# Patient Record
Sex: MALE | Race: WHITE | Employment: OTHER | URBAN - METROPOLITAN AREA
[De-identification: names, ages, dates, MRNs, and addresses within clinical notes are randomized per-mention and may not be internally consistent; named-entity substitution may affect disease eponyms.]

---

## 2021-07-15 ENCOUNTER — APPOINTMENT (OUTPATIENT)
Dept: GENERAL RADIOLOGY | Age: 86
DRG: 811 | End: 2021-07-15
Payer: MEDICARE

## 2021-07-15 ENCOUNTER — HOSPITAL ENCOUNTER (INPATIENT)
Age: 86
LOS: 3 days | Discharge: OTHER INSTITUTION | DRG: 811 | End: 2021-07-19
Attending: EMERGENCY MEDICINE | Admitting: INTERNAL MEDICINE
Payer: MEDICARE

## 2021-07-15 ENCOUNTER — APPOINTMENT (OUTPATIENT)
Dept: CT IMAGING | Age: 86
DRG: 811 | End: 2021-07-15
Payer: MEDICARE

## 2021-07-15 DIAGNOSIS — D64.9 ANEMIA, UNSPECIFIED TYPE: ICD-10-CM

## 2021-07-15 DIAGNOSIS — N17.9 AKI (ACUTE KIDNEY INJURY) (HCC): Primary | ICD-10-CM

## 2021-07-15 PROBLEM — K21.9 ACID REFLUX: Status: ACTIVE | Noted: 2021-07-15

## 2021-07-15 PROBLEM — N28.9 RENAL INSUFFICIENCY: Status: ACTIVE | Noted: 2021-07-15

## 2021-07-15 LAB
ABO/RH: NORMAL
ALBUMIN SERPL-MCNC: 2.8 G/DL (ref 3.5–5.2)
ALP BLD-CCNC: 98 U/L (ref 40–129)
ALT SERPL-CCNC: 56 U/L (ref 0–40)
ANION GAP SERPL CALCULATED.3IONS-SCNC: 9 MMOL/L (ref 7–16)
ANISOCYTOSIS: ABNORMAL
ANTIBODY SCREEN: NORMAL
AST SERPL-CCNC: 68 U/L (ref 0–39)
BACTERIA: NORMAL /HPF
BASOPHILS ABSOLUTE: 0 E9/L (ref 0–0.2)
BASOPHILS RELATIVE PERCENT: 0 % (ref 0–2)
BILIRUB SERPL-MCNC: 0.6 MG/DL (ref 0–1.2)
BILIRUBIN URINE: NEGATIVE
BLOOD, URINE: ABNORMAL
BUN BLDV-MCNC: 68 MG/DL (ref 6–23)
CALCIUM SERPL-MCNC: 9.3 MG/DL (ref 8.6–10.2)
CHLORIDE BLD-SCNC: 105 MMOL/L (ref 98–107)
CHP ED QC CHECK: YES
CLARITY: CLEAR
CO2: 24 MMOL/L (ref 22–29)
COLOR: YELLOW
CREAT SERPL-MCNC: 2.1 MG/DL (ref 0.7–1.2)
EOSINOPHILS ABSOLUTE: 0 E9/L (ref 0.05–0.5)
EOSINOPHILS RELATIVE PERCENT: 0 % (ref 0–6)
GFR AFRICAN AMERICAN: 36
GFR NON-AFRICAN AMERICAN: 30 ML/MIN/1.73
GLUCOSE BLD-MCNC: 125 MG/DL (ref 74–99)
GLUCOSE BLD-MCNC: 129 MG/DL
GLUCOSE URINE: NEGATIVE MG/DL
HCT VFR BLD CALC: 21.6 % (ref 37–54)
HEMOGLOBIN: 7 G/DL (ref 12.5–16.5)
KETONES, URINE: NEGATIVE MG/DL
LACTIC ACID: 0.7 MMOL/L (ref 0.5–2.2)
LEUKOCYTE ESTERASE, URINE: NEGATIVE
LYMPHOCYTES ABSOLUTE: 0.78 E9/L (ref 1.5–4)
LYMPHOCYTES RELATIVE PERCENT: 6 % (ref 20–42)
MCH RBC QN AUTO: 34.7 PG (ref 26–35)
MCHC RBC AUTO-ENTMCNC: 32.4 % (ref 32–34.5)
MCV RBC AUTO: 106.9 FL (ref 80–99.9)
METER GLUCOSE: 129 MG/DL (ref 74–99)
MONOCYTES ABSOLUTE: 0.91 E9/L (ref 0.1–0.95)
MONOCYTES RELATIVE PERCENT: 7 % (ref 2–12)
NEUTROPHILS ABSOLUTE: 11.31 E9/L (ref 1.8–7.3)
NEUTROPHILS RELATIVE PERCENT: 87 % (ref 43–80)
NITRITE, URINE: NEGATIVE
OVALOCYTES: ABNORMAL
PDW BLD-RTO: 16.7 FL (ref 11.5–15)
PH UA: 5.5 (ref 5–9)
PLATELET # BLD: 261 E9/L (ref 130–450)
PMV BLD AUTO: 11 FL (ref 7–12)
POIKILOCYTES: ABNORMAL
POLYCHROMASIA: ABNORMAL
POTASSIUM REFLEX MAGNESIUM: 4.4 MMOL/L (ref 3.5–5)
PROTEIN UA: 30 MG/DL
RBC # BLD: 2.02 E12/L (ref 3.8–5.8)
RBC UA: NORMAL /HPF (ref 0–2)
SODIUM BLD-SCNC: 138 MMOL/L (ref 132–146)
SPECIFIC GRAVITY UA: 1.02 (ref 1–1.03)
TOTAL PROTEIN: 6.2 G/DL (ref 6.4–8.3)
UROBILINOGEN, URINE: 1 E.U./DL
WBC # BLD: 13 E9/L (ref 4.5–11.5)
WBC UA: NORMAL /HPF (ref 0–5)

## 2021-07-15 PROCEDURE — 36415 COLL VENOUS BLD VENIPUNCTURE: CPT

## 2021-07-15 PROCEDURE — 87088 URINE BACTERIA CULTURE: CPT

## 2021-07-15 PROCEDURE — 99223 1ST HOSP IP/OBS HIGH 75: CPT | Performed by: INTERNAL MEDICINE

## 2021-07-15 PROCEDURE — 86923 COMPATIBILITY TEST ELECTRIC: CPT

## 2021-07-15 PROCEDURE — 83605 ASSAY OF LACTIC ACID: CPT

## 2021-07-15 PROCEDURE — 82746 ASSAY OF FOLIC ACID SERUM: CPT

## 2021-07-15 PROCEDURE — P9016 RBC LEUKOCYTES REDUCED: HCPCS

## 2021-07-15 PROCEDURE — G0378 HOSPITAL OBSERVATION PER HR: HCPCS

## 2021-07-15 PROCEDURE — 86900 BLOOD TYPING SEROLOGIC ABO: CPT

## 2021-07-15 PROCEDURE — 80053 COMPREHEN METABOLIC PANEL: CPT

## 2021-07-15 PROCEDURE — 83540 ASSAY OF IRON: CPT

## 2021-07-15 PROCEDURE — 71045 X-RAY EXAM CHEST 1 VIEW: CPT

## 2021-07-15 PROCEDURE — 6370000000 HC RX 637 (ALT 250 FOR IP): Performed by: INTERNAL MEDICINE

## 2021-07-15 PROCEDURE — 99285 EMERGENCY DEPT VISIT HI MDM: CPT

## 2021-07-15 PROCEDURE — 86850 RBC ANTIBODY SCREEN: CPT

## 2021-07-15 PROCEDURE — 83550 IRON BINDING TEST: CPT

## 2021-07-15 PROCEDURE — 86901 BLOOD TYPING SEROLOGIC RH(D): CPT

## 2021-07-15 PROCEDURE — 85025 COMPLETE CBC W/AUTO DIFF WBC: CPT

## 2021-07-15 PROCEDURE — 93005 ELECTROCARDIOGRAM TRACING: CPT | Performed by: STUDENT IN AN ORGANIZED HEALTH CARE EDUCATION/TRAINING PROGRAM

## 2021-07-15 PROCEDURE — 82962 GLUCOSE BLOOD TEST: CPT

## 2021-07-15 PROCEDURE — 82607 VITAMIN B-12: CPT

## 2021-07-15 PROCEDURE — 2580000003 HC RX 258: Performed by: INTERNAL MEDICINE

## 2021-07-15 PROCEDURE — 81001 URINALYSIS AUTO W/SCOPE: CPT

## 2021-07-15 PROCEDURE — 2580000003 HC RX 258: Performed by: STUDENT IN AN ORGANIZED HEALTH CARE EDUCATION/TRAINING PROGRAM

## 2021-07-15 PROCEDURE — 70450 CT HEAD/BRAIN W/O DYE: CPT

## 2021-07-15 RX ORDER — VENLAFAXINE 37.5 MG/1
37.5 TABLET ORAL 2 TIMES DAILY
Status: DISCONTINUED | OUTPATIENT
Start: 2021-07-15 | End: 2021-07-19 | Stop reason: HOSPADM

## 2021-07-15 RX ORDER — DIMENHYDRINATE 50 MG
1 TABLET ORAL DAILY
Status: ON HOLD | COMMUNITY
End: 2021-09-13 | Stop reason: HOSPADM

## 2021-07-15 RX ORDER — FERROUS SULFATE 325(65) MG
325 TABLET ORAL
COMMUNITY

## 2021-07-15 RX ORDER — ROSUVASTATIN CALCIUM 10 MG/1
10 TABLET, COATED ORAL DAILY
Status: DISCONTINUED | OUTPATIENT
Start: 2021-07-16 | End: 2021-07-19 | Stop reason: HOSPADM

## 2021-07-15 RX ORDER — POLYETHYLENE GLYCOL 3350 17 G/17G
17 POWDER, FOR SOLUTION ORAL DAILY PRN
Status: DISCONTINUED | OUTPATIENT
Start: 2021-07-15 | End: 2021-07-19 | Stop reason: HOSPADM

## 2021-07-15 RX ORDER — DOCUSATE SODIUM 100 MG/1
100 CAPSULE, LIQUID FILLED ORAL 2 TIMES DAILY
COMMUNITY

## 2021-07-15 RX ORDER — BISACODYL 10 MG
10 SUPPOSITORY, RECTAL RECTAL DAILY
COMMUNITY

## 2021-07-15 RX ORDER — ACETAMINOPHEN 325 MG/1
650 TABLET ORAL EVERY 6 HOURS PRN
Status: DISCONTINUED | OUTPATIENT
Start: 2021-07-15 | End: 2021-07-19 | Stop reason: HOSPADM

## 2021-07-15 RX ORDER — ACETAMINOPHEN 650 MG/1
650 SUPPOSITORY RECTAL EVERY 6 HOURS PRN
Status: DISCONTINUED | OUTPATIENT
Start: 2021-07-15 | End: 2021-07-19 | Stop reason: HOSPADM

## 2021-07-15 RX ORDER — 0.9 % SODIUM CHLORIDE 0.9 %
1000 INTRAVENOUS SOLUTION INTRAVENOUS ONCE
Status: COMPLETED | OUTPATIENT
Start: 2021-07-15 | End: 2021-07-15

## 2021-07-15 RX ORDER — ONDANSETRON 2 MG/ML
4 INJECTION INTRAMUSCULAR; INTRAVENOUS EVERY 6 HOURS PRN
Status: DISCONTINUED | OUTPATIENT
Start: 2021-07-15 | End: 2021-07-19 | Stop reason: HOSPADM

## 2021-07-15 RX ORDER — CLOPIDOGREL BISULFATE 75 MG/1
75 TABLET ORAL DAILY
Status: ON HOLD | COMMUNITY
End: 2021-09-13 | Stop reason: HOSPADM

## 2021-07-15 RX ORDER — SACUBITRIL AND VALSARTAN 24; 26 MG/1; MG/1
1 TABLET, FILM COATED ORAL 2 TIMES DAILY
COMMUNITY

## 2021-07-15 RX ORDER — SODIUM CHLORIDE 9 MG/ML
25 INJECTION, SOLUTION INTRAVENOUS PRN
Status: DISCONTINUED | OUTPATIENT
Start: 2021-07-15 | End: 2021-07-19 | Stop reason: HOSPADM

## 2021-07-15 RX ORDER — ROSUVASTATIN CALCIUM 10 MG/1
10 TABLET, COATED ORAL DAILY
COMMUNITY

## 2021-07-15 RX ORDER — TORSEMIDE 10 MG/1
5 TABLET ORAL EVERY OTHER DAY
COMMUNITY

## 2021-07-15 RX ORDER — ONDANSETRON 4 MG/1
4 TABLET, ORALLY DISINTEGRATING ORAL EVERY 8 HOURS PRN
Status: DISCONTINUED | OUTPATIENT
Start: 2021-07-15 | End: 2021-07-19 | Stop reason: HOSPADM

## 2021-07-15 RX ORDER — CARVEDILOL 6.25 MG/1
6.25 TABLET ORAL 2 TIMES DAILY WITH MEALS
Status: DISCONTINUED | OUTPATIENT
Start: 2021-07-16 | End: 2021-07-19 | Stop reason: HOSPADM

## 2021-07-15 RX ORDER — SODIUM CHLORIDE 0.9 % (FLUSH) 0.9 %
5-40 SYRINGE (ML) INJECTION EVERY 12 HOURS SCHEDULED
Status: DISCONTINUED | OUTPATIENT
Start: 2021-07-15 | End: 2021-07-19 | Stop reason: HOSPADM

## 2021-07-15 RX ORDER — SODIUM CHLORIDE 0.9 % (FLUSH) 0.9 %
5-40 SYRINGE (ML) INJECTION PRN
Status: DISCONTINUED | OUTPATIENT
Start: 2021-07-15 | End: 2021-07-19 | Stop reason: HOSPADM

## 2021-07-15 RX ORDER — TRAMADOL HYDROCHLORIDE 50 MG/1
50 TABLET ORAL EVERY 4 HOURS PRN
Status: ON HOLD | COMMUNITY
End: 2021-09-13 | Stop reason: HOSPADM

## 2021-07-15 RX ORDER — FAMOTIDINE 20 MG/1
20 TABLET, FILM COATED ORAL DAILY
Status: DISCONTINUED | OUTPATIENT
Start: 2021-07-16 | End: 2021-07-16

## 2021-07-15 RX ORDER — FAMOTIDINE 20 MG/1
20 TABLET, FILM COATED ORAL DAILY
Status: ON HOLD | COMMUNITY
End: 2021-09-13 | Stop reason: HOSPADM

## 2021-07-15 RX ORDER — SODIUM CHLORIDE 9 MG/ML
INJECTION, SOLUTION INTRAVENOUS CONTINUOUS
Status: DISCONTINUED | OUTPATIENT
Start: 2021-07-15 | End: 2021-07-17

## 2021-07-15 RX ORDER — CARVEDILOL 6.25 MG/1
6.25 TABLET ORAL 2 TIMES DAILY WITH MEALS
Status: ON HOLD | COMMUNITY
End: 2021-09-13 | Stop reason: HOSPADM

## 2021-07-15 RX ORDER — FERROUS SULFATE 325(65) MG
325 TABLET ORAL
Status: DISCONTINUED | OUTPATIENT
Start: 2021-07-16 | End: 2021-07-16

## 2021-07-15 RX ORDER — VENLAFAXINE 37.5 MG/1
37.5 TABLET ORAL 2 TIMES DAILY
COMMUNITY

## 2021-07-15 RX ORDER — VITAMIN B COMPLEX
1 CAPSULE ORAL DAILY
COMMUNITY

## 2021-07-15 RX ADMIN — SODIUM CHLORIDE 1000 ML: 9 INJECTION, SOLUTION INTRAVENOUS at 18:28

## 2021-07-15 RX ADMIN — SODIUM CHLORIDE: 9 INJECTION, SOLUTION INTRAVENOUS at 19:55

## 2021-07-15 RX ADMIN — SACUBITRIL AND VALSARTAN 1 TABLET: 24; 26 TABLET, FILM COATED ORAL at 23:30

## 2021-07-15 RX ADMIN — SODIUM CHLORIDE, PRESERVATIVE FREE 10 ML: 5 INJECTION INTRAVENOUS at 23:29

## 2021-07-15 RX ADMIN — VENLAFAXINE 37.5 MG: 37.5 TABLET ORAL at 23:30

## 2021-07-15 ASSESSMENT — PAIN DESCRIPTION - LOCATION: LOCATION: HIP

## 2021-07-15 ASSESSMENT — PAIN DESCRIPTION - DESCRIPTORS: DESCRIPTORS: ACHING;DISCOMFORT

## 2021-07-15 ASSESSMENT — PAIN SCALES - GENERAL
PAINLEVEL_OUTOF10: 0
PAINLEVEL_OUTOF10: 4

## 2021-07-15 ASSESSMENT — PAIN DESCRIPTION - ORIENTATION: ORIENTATION: RIGHT

## 2021-07-15 ASSESSMENT — PAIN DESCRIPTION - ONSET: ONSET: ON-GOING

## 2021-07-15 ASSESSMENT — PAIN DESCRIPTION - FREQUENCY: FREQUENCY: INTERMITTENT

## 2021-07-15 ASSESSMENT — PAIN DESCRIPTION - PAIN TYPE: TYPE: SURGICAL PAIN

## 2021-07-15 NOTE — ED NOTES
Bed: 04  Expected date:   Expected time:   Means of arrival:   Comments:  [id number] [place] [facility]  07/15/21 7963

## 2021-07-15 NOTE — ED NOTES
Applied oxygen 2.5 LPM N/C. Oxygen saturation while sleeping was 87-88% R/a. Patient is a mouth breather. 1:1 w/o effect, will monitor.      Nolvia Laura RN  07/15/21 5173

## 2021-07-15 NOTE — ED PROVIDER NOTES
Chief Complaint   Patient presents with    Other     post op left hip repair, reported low Hbg from SNF today of 6.6       Patient is a 80-year-old male presents today for evaluation of abnormal lab work. Per EMS patient is from nursing home, was just released from [facility] yesterday for which he did have a left hip replacement. He had routine blood work today which noted he was anemic with a hemoglobin in the sixes. Per chart review patient is not on anticoagulation. On presentation he is alert to person only, he is able to move extremities and follow commands, however does not know where he is or what year it is. He is at baseline per EMS. He has no complaints at this time. Denies chest pain or shortness of breath. Denies cough, fevers or chills. Denies dark stools or blood in his stools. The history is provided by the patient and the nursing home. Review of Systems   Unable to perform ROS: Mental status change        Physical Exam  Vitals and nursing note reviewed. Constitutional:       General: He is not in acute distress. Appearance: He is well-developed. He is not toxic-appearing. HENT:      Head: Normocephalic and atraumatic. Eyes:      Pupils: Pupils are equal, round, and reactive to light. Cardiovascular:      Rate and Rhythm: Normal rate and regular rhythm. Heart sounds: Normal heart sounds. No murmur heard. Pulmonary:      Effort: Pulmonary effort is normal. No respiratory distress. Breath sounds: Normal breath sounds. No wheezing or rales. Abdominal:      General: Bowel sounds are normal.      Palpations: Abdomen is soft. Tenderness: There is no abdominal tenderness. There is no guarding or rebound. Musculoskeletal:      Cervical back: Normal range of motion and neck supple. Skin:     General: Skin is warm and dry. Capillary Refill: Capillary refill takes less than 2 seconds. Comments:  Well appearing surgical incision to left lateral hip, no erythema    Neurological:      Mental Status: He is alert. He is disoriented. Cranial Nerves: No cranial nerve deficit. Coordination: Coordination normal.      Comments: Alert to person only  Following commands  Moving all extremities          Procedures     Labs Reviewed   CBC WITH AUTO DIFFERENTIAL - Abnormal; Notable for the following components:       Result Value    WBC 13.0 (*)     RBC 2.02 (*)     Hemoglobin 7.0 (*)     Hematocrit 21.6 (*)     .9 (*)     RDW 16.7 (*)     Neutrophils % 87.0 (*)     Lymphocytes % 6.0 (*)     Neutrophils Absolute 11.31 (*)     Lymphocytes Absolute 0.78 (*)     Eosinophils Absolute 0.00 (*)     All other components within normal limits   COMPREHENSIVE METABOLIC PANEL W/ REFLEX TO MG FOR LOW K - Abnormal; Notable for the following components:    Glucose 125 (*)     BUN 68 (*)     CREATININE 2.1 (*)     Total Protein 6.2 (*)     Albumin 2.8 (*)     ALT 56 (*)     AST 68 (*)     All other components within normal limits   URINALYSIS - Abnormal; Notable for the following components:    Blood, Urine SMALL (*)     Protein, UA 30 (*)     All other components within normal limits   POCT GLUCOSE - Abnormal; Notable for the following components:    Meter Glucose 129 (*)     All other components within normal limits   POCT GLUCOSE - Normal   CULTURE, URINE   LACTIC ACID, PLASMA   MICROSCOPIC URINALYSIS   TYPE AND SCREEN     XR CHEST 1 VIEW   Final Result   No radiographic evidence of definite acute cardiopulmonary disease in the   visualized chest         CT Head WO Contrast   Final Result   No acute intracranial abnormality. Periventricular white matter changes consistent chronic microvascular   disease. Diffuse volume loss. EKG #1:  I personally interpreted this EKG  Time:  1756    Rate: 65  Rhythm: Sinus.   Interpretation: Sinus rhythm with first-degree AV block, left axis deviation, left bundle branch block, no previous for comparison patient is a 61-year-old male presents today for concern for anemia. [facility]  Number of Diagnoses or Management Options  MAVIS (acute kidney injury) ([facility] [address]  Anemia, unspecified type  Diagnosis management comments: Patient is a 61-year-old male presents today for concerns for anemia. On presentation patient is alert to person only, however this is baseline according to EMS. Patient is able to follow commands. Surgical incision to left hip appears well hearing, no evidence of bleeding at this time. Lab work imaging was obtained. Lab work shows hemoglobin is 7.0, no previous for comparison. Creatinine elevated 2.1, no previous, however per chart review patient does not have history of kidney disease. This is likely due to dehydration. Patient was given IV fluid bolus and started on fluid maintenance. CT head is unremarkable, chest x-ray and UA showed no evidence of infection. With borderline anemia of 7.0 and creatinine of 2.  1 we did recommend admission to the hospital for observation. Hospitalist was consulted agrees to admit. Amount and/or Complexity of Data Reviewed  Clinical lab tests: reviewed  Tests in the radiology section of CPT®: reviewed  Tests in the medicine section of CPT®: reviewed                  --------------------------------------------- PAST HISTORY ---------------------------------------------  Past Medical History:  has a past medical history of CAD (coronary artery disease), CHF (congestive heart failure) ([facility] [address] COPD (chronic obstructive pulmonary disease) ([facility] GERD (gastroesophageal reflux disease), Hyperlipidemia, Hypertension, and Myocardial infarction ([facility] Past Surgical History:  has a past surgical history that includes Coronary artery bypass graft (15 yrs ago); Colonoscopy; Cataract removal with implant (Right, 3/3/14); and Cataract removal with implant (Left, 10/13/2014). Social History:  reports that he quit smoking about 37 years ago.  He quit after 20.00 years of use. He does not have any smokeless tobacco history on file. He reports that he does not drink alcohol and does not use drugs. Family History: family history is not on file. The patients home medications have been reviewed.     Allergies: Fentanyl and Penicillins    -------------------------------------------------- RESULTS -------------------------------------------------    LABS:  Results for orders placed or performed during the hospital encounter of 07/15/21   CBC Auto Differential   Result Value Ref Range    WBC 13.0 (H) 4.5 - 11.5 E9/L    RBC 2.02 (L) 3.80 - 5.80 E12/L    Hemoglobin 7.0 (L) 12.5 - 16.5 g/dL    Hematocrit 21.6 (L) 37.0 - 54.0 %    .9 (H) 80.0 - 99.9 fL    MCH 34.7 26.0 - 35.0 pg    MCHC 32.4 32.0 - 34.5 %    RDW 16.7 (H) 11.5 - 15.0 fL    Platelets 669 156 - 878 E9/L    MPV 11.0 7.0 - 12.0 fL    Neutrophils % 87.0 (H) 43.0 - 80.0 %    Lymphocytes % 6.0 (L) 20.0 - 42.0 %    Monocytes % 7.0 2.0 - 12.0 %    Eosinophils % 0.0 0.0 - 6.0 %    Basophils % 0.0 0.0 - 2.0 %    Neutrophils Absolute 11.31 (H) 1.80 - 7.30 E9/L    Lymphocytes Absolute 0.78 (L) 1.50 - 4.00 E9/L    Monocytes Absolute 0.91 0.10 - 0.95 E9/L    Eosinophils Absolute 0.00 (L) 0.05 - 0.50 E9/L    Basophils Absolute 0.00 0.00 - 0.20 E9/L    Anisocytosis 1+     Polychromasia 1+     Poikilocytes 1+     Ovalocytes 1+    Comprehensive Metabolic Panel w/ Reflex to MG   Result Value Ref Range    Sodium 138 132 - 146 mmol/L    Potassium reflex Magnesium 4.4 3.5 - 5.0 mmol/L    Chloride 105 98 - 107 mmol/L    CO2 24 22 - 29 mmol/L    Anion Gap 9 7 - 16 mmol/L    Glucose 125 (H) 74 - 99 mg/dL    BUN 68 (H) 6 - 23 mg/dL    CREATININE 2.1 (H) 0.7 - 1.2 mg/dL    GFR Non-African American 30 >=60 mL/min/1.73    GFR African American 36     Calcium 9.3 8.6 - 10.2 mg/dL    Total Protein 6.2 (L) 6.4 - 8.3 g/dL    Albumin 2.8 (L) 3.5 - 5.2 g/dL    Total Bilirubin 0.6 0.0 - 1.2 mg/dL    Alkaline Phosphatase 98 40 - 129 U/L    ALT 56 (H) 0 - 40 U/L    AST 68 (H) 0 - 39 U/L   Lactic Acid, Plasma   Result Value Ref Range    Lactic Acid 0.7 0.5 - 2.2 mmol/L   Urinalysis, reflex to microscopic   Result Value Ref Range    Color, UA Yellow Straw/Yellow    Clarity, UA Clear Clear    Glucose, Ur Negative Negative mg/dL    Bilirubin Urine Negative Negative    Ketones, Urine Negative Negative mg/dL    Specific Gravity, UA 1.020 1.005 - 1.030    Blood, Urine SMALL (A) Negative    pH, UA 5.5 5.0 - 9.0    Protein, UA 30 (A) Negative mg/dL    Urobilinogen, Urine 1.0 <2.0 E.U./dL    Nitrite, Urine Negative Negative    Leukocyte Esterase, Urine Negative Negative   Microscopic Urinalysis   Result Value Ref Range    WBC, UA NONE 0 - 5 /HPF    RBC, UA 0-1 0 - 2 /HPF    Bacteria, UA NONE SEEN None Seen /HPF   POCT Glucose   Result Value Ref Range    Glucose 129 mg/dL    QC OK? yes    POCT Glucose   Result Value Ref Range    Meter Glucose 129 (H) 74 - 99 mg/dL   EKG 12 Lead   Result Value Ref Range    Ventricular Rate 65 BPM    Atrial Rate 65 BPM    P-R Interval 236 ms    QRS Duration 170 ms    Q-T Interval 442 ms    QTc Calculation (Bazett) 459 ms    P Axis 12 degrees    R Axis -54 degrees    T Axis 108 degrees   TYPE AND SCREEN   Result Value Ref Range    ABO/Rh B POS     Antibody Screen NEG        RADIOLOGY:  XR CHEST 1 VIEW   Final Result   No radiographic evidence of definite acute cardiopulmonary disease in the   visualized chest         CT Head WO Contrast   Final Result   No acute intracranial abnormality. Periventricular white matter changes consistent chronic microvascular   disease. Diffuse volume loss.                 ------------------------- NURSING NOTES AND VITALS REVIEWED ---------------------------  Date / Time Roomed:  7/15/2021  4:26 PM  ED Bed Assignment:  04/04    The nursing notes within the ED encounter and vital signs as below have been reviewed.      Patient Vitals for the past 24 hrs:   BP Temp Temp src Pulse Resp SpO2 Weight 07/15/21 1826 137/62   62 26 96 %    07/15/21 1747 (!) 141/58 98.9 °F (37.2 °C)  69 (!) 31 97 %    07/15/21 1634 (!) 155/65 98.9 °F (37.2 °C) Oral 74 18 91 % 180 lb 3.2 oz (81.7 kg)       Oxygen Saturation Interpretation: Normal    ------------------------------------------ PROGRESS NOTES ------------------------------------------    Counseling:  I have spoken with the patient and discussed todays results, in addition to providing specific details for the plan of care and counseling regarding the diagnosis and prognosis. Their questions are answered at this time and they are agreeable with the plan of admission.    --------------------------------- ADDITIONAL PROVIDER NOTES ---------------------------------  Consultations:  Spoke with Dr. Caren Diaz. Discussed case. They will admit the patient. This patient's ED course included: a personal history and physicial examination    This patient has remained hemodynamically stable during their ED course. Medications   0.9 % sodium chloride infusion (has no administration in time range)   0.9 % sodium chloride bolus (0 mLs Intravenous Stopped 7/15/21 8838)         Diagnosis:  1. MAVIS (acute kidney injury) ([facility] [address]    2. Anemia, unspecified type        Disposition:  Patient's disposition: Admit to telemetry  Patient's condition is stable.              María Chino DO  Resident  07/15/21 8961

## 2021-07-15 NOTE — LETTER
[place] Department Medicaid  CERTIFICATION OF NECESSITY  FOR NON-EMERGENCY TRANSPORTATION   BY [facility] AM[facility]UL[facility]      Individual Information   1. Name: Tatum Bone 2. [place] Medicaid Billing Number:    3. Address: [address]      Transportation Provider Information   4. Provider Name: [facility]    5. [place] Medicaid Provider Number:  National Provider Identifier (NPI):      Certification  7. Criteria:  During transport, this individual requires:  [x] Medical treatment or continuous     supervision by an EMT. [] The administration or regulation of oxygen by another person. [] Supervised protective restraint. 8. Period Beginning Date: 7-   9. Length  [x] Not more than 1 day(s)  [] One Year     Additional Information Relevant to Certification   10. Comments or Explanations, If Necessary or Appropriate     Recernt left hip ORIF ,increased pain when sitting in chair, CHF, COPD      Certifying Practitioner Information   11. Name of Practitioner: Emily Alvares MD    12. [place] Medicaid Provider Number, If Applicable:  [address] Provider Identifier (NPI): [id number]     Signature Information   14. Date of Signature: 7- 15. Name of Person Signing: Jimmy Rosario RN   16.  Signature and Professional Designation: Electronically signed by Jimmy Rosario RN on 7/19/2021 at 10:53 AM       ODM [id number]

## 2021-07-16 PROBLEM — I25.10 CAD (CORONARY ARTERY DISEASE): Status: ACTIVE | Noted: 2021-07-16

## 2021-07-16 PROBLEM — F32.A DEPRESSION: Status: ACTIVE | Noted: 2021-07-16

## 2021-07-16 PROBLEM — I50.9 CHF (CONGESTIVE HEART FAILURE) (HCC): Status: ACTIVE | Noted: 2021-07-16

## 2021-07-16 PROBLEM — I50.30 DIASTOLIC CONGESTIVE HEART FAILURE (HCC): Status: ACTIVE | Noted: 2021-07-16

## 2021-07-16 PROBLEM — J96.01 ACUTE RESPIRATORY FAILURE WITH HYPOXIA (HCC): Status: ACTIVE | Noted: 2021-07-16

## 2021-07-16 PROBLEM — J44.9 COPD (CHRONIC OBSTRUCTIVE PULMONARY DISEASE) (HCC): Status: ACTIVE | Noted: 2021-07-16

## 2021-07-16 LAB
ANION GAP SERPL CALCULATED.3IONS-SCNC: 11 MMOL/L (ref 7–16)
BLOOD BANK DISPENSE STATUS: NORMAL
BLOOD BANK DISPENSE STATUS: NORMAL
BLOOD BANK PRODUCT CODE: NORMAL
BLOOD BANK PRODUCT CODE: NORMAL
BPU ID: NORMAL
BPU ID: NORMAL
BUN BLDV-MCNC: 63 MG/DL (ref 6–23)
CALCIUM SERPL-MCNC: 8.8 MG/DL (ref 8.6–10.2)
CHLORIDE BLD-SCNC: 106 MMOL/L (ref 98–107)
CO2: 23 MMOL/L (ref 22–29)
CREAT SERPL-MCNC: 1.9 MG/DL (ref 0.7–1.2)
DESCRIPTION BLOOD BANK: NORMAL
DESCRIPTION BLOOD BANK: NORMAL
EKG ATRIAL RATE: 65 BPM
EKG P AXIS: 12 DEGREES
EKG P-R INTERVAL: 236 MS
EKG Q-T INTERVAL: 442 MS
EKG QRS DURATION: 170 MS
EKG QTC CALCULATION (BAZETT): 459 MS
EKG R AXIS: -54 DEGREES
EKG T AXIS: 108 DEGREES
EKG VENTRICULAR RATE: 65 BPM
FERRITIN: 858 NG/ML
FOLATE: >20 NG/ML (ref 4.8–24.2)
GFR AFRICAN AMERICAN: 41
GFR NON-AFRICAN AMERICAN: 34 ML/MIN/1.73
GLUCOSE BLD-MCNC: 109 MG/DL (ref 74–99)
HCT VFR BLD CALC: 19.7 % (ref 37–54)
HCT VFR BLD CALC: 21.4 % (ref 37–54)
HCT VFR BLD CALC: 25.8 % (ref 37–54)
HEMOGLOBIN: 6.3 G/DL (ref 12.5–16.5)
HEMOGLOBIN: 6.9 G/DL (ref 12.5–16.5)
HEMOGLOBIN: 8.4 G/DL (ref 12.5–16.5)
IRON SATURATION: 20 % (ref 20–55)
IRON: 27 MCG/DL (ref 59–158)
MCH RBC QN AUTO: 34.6 PG (ref 26–35)
MCHC RBC AUTO-ENTMCNC: 32 % (ref 32–34.5)
MCV RBC AUTO: 108.2 FL (ref 80–99.9)
PDW BLD-RTO: 16.6 FL (ref 11.5–15)
PLATELET # BLD: 254 E9/L (ref 130–450)
PMV BLD AUTO: 11.1 FL (ref 7–12)
POTASSIUM REFLEX MAGNESIUM: 4.5 MMOL/L (ref 3.5–5)
PROCALCITONIN: 0.28 NG/ML (ref 0–0.08)
RBC # BLD: 1.82 E12/L (ref 3.8–5.8)
SARS-COV-2, NAAT: NOT DETECTED
SODIUM BLD-SCNC: 140 MMOL/L (ref 132–146)
TOTAL IRON BINDING CAPACITY: 136 MCG/DL (ref 250–450)
VITAMIN B-12: 1093 PG/ML (ref 211–946)
WBC # BLD: 11.7 E9/L (ref 4.5–11.5)

## 2021-07-16 PROCEDURE — 99233 SBSQ HOSP IP/OBS HIGH 50: CPT | Performed by: INTERNAL MEDICINE

## 2021-07-16 PROCEDURE — 80048 BASIC METABOLIC PNL TOTAL CA: CPT

## 2021-07-16 PROCEDURE — 85014 HEMATOCRIT: CPT

## 2021-07-16 PROCEDURE — 85018 HEMOGLOBIN: CPT

## 2021-07-16 PROCEDURE — 6370000000 HC RX 637 (ALT 250 FOR IP): Performed by: INTERNAL MEDICINE

## 2021-07-16 PROCEDURE — 2700000000 HC OXYGEN THERAPY PER DAY

## 2021-07-16 PROCEDURE — 2580000003 HC RX 258: Performed by: INTERNAL MEDICINE

## 2021-07-16 PROCEDURE — 36415 COLL VENOUS BLD VENIPUNCTURE: CPT

## 2021-07-16 PROCEDURE — 97161 PT EVAL LOW COMPLEX 20 MIN: CPT | Performed by: PHYSICAL THERAPIST

## 2021-07-16 PROCEDURE — 6370000000 HC RX 637 (ALT 250 FOR IP): Performed by: NURSE PRACTITIONER

## 2021-07-16 PROCEDURE — 97165 OT EVAL LOW COMPLEX 30 MIN: CPT | Performed by: OCCUPATIONAL THERAPIST

## 2021-07-16 PROCEDURE — 87635 SARS-COV-2 COVID-19 AMP PRB: CPT

## 2021-07-16 PROCEDURE — 93010 ELECTROCARDIOGRAM REPORT: CPT | Performed by: INTERNAL MEDICINE

## 2021-07-16 PROCEDURE — 1200000000 HC SEMI PRIVATE

## 2021-07-16 PROCEDURE — 82728 ASSAY OF FERRITIN: CPT

## 2021-07-16 PROCEDURE — 36430 TRANSFUSION BLD/BLD COMPNT: CPT

## 2021-07-16 PROCEDURE — APPSS30 APP SPLIT SHARED TIME 16-30 MINUTES: Performed by: NURSE PRACTITIONER

## 2021-07-16 PROCEDURE — 84145 PROCALCITONIN (PCT): CPT

## 2021-07-16 PROCEDURE — 85027 COMPLETE CBC AUTOMATED: CPT

## 2021-07-16 PROCEDURE — 97530 THERAPEUTIC ACTIVITIES: CPT | Performed by: PHYSICAL THERAPIST

## 2021-07-16 PROCEDURE — 2580000003 HC RX 258: Performed by: STUDENT IN AN ORGANIZED HEALTH CARE EDUCATION/TRAINING PROGRAM

## 2021-07-16 RX ORDER — ALBUTEROL SULFATE 2.5 MG/3ML
2.5 SOLUTION RESPIRATORY (INHALATION)
Status: DISCONTINUED | OUTPATIENT
Start: 2021-07-16 | End: 2021-07-19 | Stop reason: HOSPADM

## 2021-07-16 RX ORDER — IPRATROPIUM BROMIDE AND ALBUTEROL SULFATE 2.5; .5 MG/3ML; MG/3ML
1 SOLUTION RESPIRATORY (INHALATION)
Status: DISCONTINUED | OUTPATIENT
Start: 2021-07-16 | End: 2021-07-16

## 2021-07-16 RX ORDER — FERROUS SULFATE 325(65) MG
325 TABLET ORAL 2 TIMES DAILY WITH MEALS
Status: DISCONTINUED | OUTPATIENT
Start: 2021-07-16 | End: 2021-07-19 | Stop reason: HOSPADM

## 2021-07-16 RX ORDER — SODIUM CHLORIDE 9 MG/ML
INJECTION, SOLUTION INTRAVENOUS PRN
Status: DISCONTINUED | OUTPATIENT
Start: 2021-07-16 | End: 2021-07-19 | Stop reason: HOSPADM

## 2021-07-16 RX ORDER — PANTOPRAZOLE SODIUM 40 MG/1
40 TABLET, DELAYED RELEASE ORAL
Status: DISCONTINUED | OUTPATIENT
Start: 2021-07-16 | End: 2021-07-19 | Stop reason: HOSPADM

## 2021-07-16 RX ORDER — DOCUSATE SODIUM 100 MG/1
100 CAPSULE, LIQUID FILLED ORAL DAILY
Status: DISCONTINUED | OUTPATIENT
Start: 2021-07-16 | End: 2021-07-19 | Stop reason: HOSPADM

## 2021-07-16 RX ADMIN — SACUBITRIL AND VALSARTAN 1 TABLET: 24; 26 TABLET, FILM COATED ORAL at 07:49

## 2021-07-16 RX ADMIN — SACUBITRIL AND VALSARTAN 1 TABLET: 24; 26 TABLET, FILM COATED ORAL at 21:06

## 2021-07-16 RX ADMIN — VENLAFAXINE 37.5 MG: 37.5 TABLET ORAL at 21:07

## 2021-07-16 RX ADMIN — VENLAFAXINE 37.5 MG: 37.5 TABLET ORAL at 07:49

## 2021-07-16 RX ADMIN — FERROUS SULFATE TAB 325 MG (65 MG ELEMENTAL FE) 325 MG: 325 (65 FE) TAB at 16:59

## 2021-07-16 RX ADMIN — SODIUM CHLORIDE, PRESERVATIVE FREE 10 ML: 5 INJECTION INTRAVENOUS at 21:10

## 2021-07-16 RX ADMIN — CARVEDILOL 6.25 MG: 6.25 TABLET, FILM COATED ORAL at 16:57

## 2021-07-16 RX ADMIN — FERROUS SULFATE TAB 325 MG (65 MG ELEMENTAL FE) 325 MG: 325 (65 FE) TAB at 07:49

## 2021-07-16 RX ADMIN — DOCUSATE SODIUM 100 MG: 100 CAPSULE, LIQUID FILLED ORAL at 13:28

## 2021-07-16 RX ADMIN — SODIUM CHLORIDE: 9 INJECTION, SOLUTION INTRAVENOUS at 07:50

## 2021-07-16 RX ADMIN — ACETAMINOPHEN 650 MG: 325 TABLET ORAL at 12:20

## 2021-07-16 RX ADMIN — ROSUVASTATIN CALCIUM 10 MG: 10 TABLET, COATED ORAL at 07:49

## 2021-07-16 RX ADMIN — CARVEDILOL 6.25 MG: 6.25 TABLET, FILM COATED ORAL at 07:50

## 2021-07-16 RX ADMIN — PANTOPRAZOLE SODIUM 40 MG: 40 TABLET, DELAYED RELEASE ORAL at 16:57

## 2021-07-16 RX ADMIN — FAMOTIDINE 20 MG: 20 TABLET ORAL at 07:49

## 2021-07-16 ASSESSMENT — PAIN SCALES - GENERAL
PAINLEVEL_OUTOF10: 4
PAINLEVEL_OUTOF10: 0
PAINLEVEL_OUTOF10: 0
PAINLEVEL_OUTOF10: 5

## 2021-07-16 NOTE — PROGRESS NOTES
[place] [address] Hospitalist   Progress Note    Admitting Date and Time: 7/15/2021  4:26 PM  Admit Dx: MAVIS (acute kidney injury) ([facility] [address] [N17.9]  [facility] [N17.9]    Subjective:    Patient seen and examined. Physical therapy at bedside helping to stand patient. He was unable to ambulate. Patient cooperative, but having difficulty following directions. ROS: denies fever, chills, cp, sob, n/v, HA unless stated above.      pantoprazole  40 mg Oral BID AC    carvedilol  6.25 mg Oral BID WC    ferrous sulfate  325 mg Oral Daily with breakfast    rosuvastatin  10 mg Oral Daily    sacubitril-valsartan  1 tablet Oral BID    venlafaxine  37.5 mg Oral BID    sodium chloride flush  5-40 mL Intravenous 2 times per day     sodium chloride, , PRN  albuterol, 2.5 mg, Q2H PRN  sodium chloride flush, 5-40 mL, PRN  sodium chloride, 25 mL, PRN  ondansetron, 4 mg, Q8H PRN   Or  ondansetron, 4 mg, Q6H PRN  polyethylene glycol, 17 g, Daily PRN  acetaminophen, 650 mg, Q6H PRN   Or  acetaminophen, 650 mg, Q6H PRN         Objective:    BP (!) 119/56   Pulse 70   Temp 97.9 °F (36.6 °C) (Axillary)   Resp 20   Ht 6' (1.829 m)   Wt 180 lb 9.6 oz (81.9 kg)   SpO2 94%   BMI 24.49 kg/m²   General Appearance: alert and oriented to person and in no acute distress  Skin: warm and dry, left hip incision with staples, small amount of drainage noted  Head: normocephalic and atraumatic  Eyes: pupils equal, round, and reactive to light, conjunctivae normal  Neck: neck supple and non tender without mass   Pulmonary/Chest: clear to auscultation bilaterally- no wheezes, rales or rhonchi, normal air movement, no respiratory distress  Cardiovascular: normal rate, normal S1 and S2   Abdomen: soft, non-tender, non-distended, normal bowel sounds, no masses or organomegaly  Extremities: no cyanosis, no clubbing and no edema  Neurologic: no cranial nerve deficit and speech normal      Recent Labs 07/15/21  1654 07/15/21  1655 07/16/21  0659   NA  --  138 140   K  --  4.4 4.5   CL  --  105 106   CO2  --  24 23   BUN  --  68* 63*   CREATININE  --  2.1* 1.9*   GLUCOSE 129 125* 109*   CALCIUM  --  9.3 8.8       Recent Labs     07/15/21  1655   ALKPHOS 98   PROT 6.2*   LABALBU 2.8*   BILITOT 0.6   AST 68*   ALT 56*       Recent Labs     07/15/21  1655 07/16/21  0659   WBC 13.0* 11.7*   RBC 2.02* 1.82*   HGB 7.0* 6.3*   HCT 21.6* 19.7*   .9* 108.2*   MCH 34.7 34.6   MCHC 32.4 32.0   RDW 16.7* 16.6*    254   MPV 11.0 11.1           Radiology:   XR CHEST 1 VIEW   Final Result   No radiographic evidence of definite acute cardiopulmonary disease in the   visualized chest         CT Head WO Contrast   Final Result   No acute intracranial abnormality. Periventricular white matter changes consistent chronic microvascular   disease. Diffuse volume loss. Assessment:  Principal Problem:    Anemia  Resolved Problems:    * No resolved hospital problems. *      Plan:  1. Anemia: Possibly secondary to GI bleed vs post op. Hemoglobin was 6.3 on admission. Received one unit packed red blood cells. Iron level 27. Increase Ferrous sulfate to twice daily. Folate and B12 pending. Continuous IV fluids. Transfuse to keep hemoglobin greater than 7. Eliquis, aspirin, plavix, and aricept on hold. Protonix ordered. 2. Acute hypoxic respiratory failure: Supplemental oxygen therapy to maintain saturation of greater than 90%. Requiring 3 liters via nasal cannula. Chest xray showed no acute cardiopulmonary disease. Duonebs and incentive spirometer ordered. 3. S/p total hip replacement: Total hip replacement performed at [facility] with discharge yesterday to skilled nursing facility. Left hip incision approximated with ecchymosis around incision. Small amount of serosanguinous drainage. Physical therapy and occupational therapy consulted.     4. Chronic kidney disease: Creatinine 2.1 on admission. Baseline creatinine 1.8-2.3 per [facility]. Torsemide on hold. 5. CAD and history of stroke: History of intracoronary stent in 1993. Left carotid endartarectomy on 6/21/16. Continue carvedilol. Aspirin and plavix on hold. Continue rouvastatin. 6. Dementia: Aricept on hold due to possible GI bleed. Monitor. May need alternative agent at discharge. 7. COPD: Duonebs and incentive spirometer ordered. 8. History of Heart failure:  Continue carvedilol and entresto. Does not appear fluid overloaded at this time. 9. Depression: Continue venlafaxine. NOTE: This report was transcribed using voice recognition software. Every effort was made to ensure accuracy; however, inadvertent computerized transcription errors may be present.      Electronically signed by LAZARO Le CNP on 7/16/2021 at 1:04 PM

## 2021-07-16 NOTE — PROGRESS NOTES
Physical Therapy Initial Evaluation/Plan of Care    Room #:  7677/4137-53  Patient Name: Vishnu Biggs  YOB: 1932  MRN: [id number]    Date of Service: 7/16/2021      Tentative placement recommendation: Subacute rehab    Equipment recommendation: To be determined      Evaluating Physical Therapist: Tin Rg, PT #[id number]      Specific Provider Orders/Date/Referring Provider :  07/15/21 2315   PT evaluation and treat Start: 07/15/21 2315, End: 07/15/21 2315, ONE TIME, Standing Count: 1 Occurrences, R    Charlestine Call, DO      Admitting Diagnosis:   MAVIS (acute kidney injury) ([facility] [address] [N17.9]  MAVIS (acute kidney injury) ([facility] [address] [N17.9]  hemoglobin of 6.6    Surgery: recent left hip replacement at [facility]     Patient Active Problem List   Diagnosis    Renal insufficiency    CKD (chronic kidney disease) stage 3, GFR 30-59 ml/min ([facility])    Essential hypertension    CAD (coronary artery disease), autologous vein bypass graft    Acid reflux    Anemia        ASSESSMENT of Current Deficits Patient exhibits decreased strength, balance, endurance, range of motion and pain left lower extremity impairing functional mobility, transfers, gait , gait distance and tolerance to activity posterior lean in standing, difficulty weight bearing Left lower extremity with inability to take step with right. Patient requires continued skilled physical therapy to address concerns listed above for increased safety and function at discharge.          PHYSICAL THERAPY  PLAN OF CARE       Physical therapy plan of care is established based on physician order,  patient diagnosis and clinical assessment    Current Treatment Recommendations:    -Bed Mobility: Lower extremity exercises  and Upper extremity exercises   -Sitting Balance: Facilitate active trunk muscle engagement  and Facilitate postural control in all planes   -Standing Balance: Perform strengthening exercises in standing to promote motor control with or without upper extremity support   -Transfers: Provide instruction on proper hand and foot position for adequate transfer of weight onto lower extremities and use of gait device, Facilitate weight shift forward on to lower extremities and provide necessary stabilization of bilateral lower extremities  and Provide stabilization to prevent fall   -Gait: Gait training, Standing activities to improve: base of support, weight shift, weight bearing  and Pregait training to emphasize: Sequencing , Base of support, Device control, Upright, bilateral knee extension in stance phase of gait and Safety   -Endurance: Utilize Supervised activities to increase level of endurance to allow for safe functional mobility including transfers and gait     PT long term treatment goals are located in below grid    Patient and or family understand(s) diagnosis, prognosis, and plan of care. Frequency of treatments: Patient will be seen  twice daily. Prior Level of Function: Patient reports he was getting up to chair with assist Prior to admit   Rehab Potential: fair    for baseline    Past medical history:   Past Medical History:   Diagnosis Date    CAD (coronary artery disease)     [facility]congestive [facility] failure) ([facility] [address] [facility]     COPD (chronic obstructive pulmonary disease) ([facility] [address] [facility]     GERD (gastroesophageal reflux disease)     Hyperlipidemia     Hypertension     Myocardial infarction ([facility] [address] 15 yrs ago     Past Surgical History:   Procedure Laterality Date    CATARACT REMOVAL WITH IMPLANT Right 3/3/14    CATARACT REMOVAL WITH IMPLANT Left 10/13/2014    COLONOSCOPY      CORONARY ARTERY BYPASS GRAFT  15 yrs ago    3 stents       SUBJECTIVE:    Precautions:  Up with assistance, falls and alarm ,  left hip replacement recently at [address] history: Patient from skilled nursing facility   prior lived with daughter Umair Drew in a ranch home  with 2 steps  to enter with [facility] owned: Mariann Smith, at [employer]        [facility] Mobility Inpatient   How much difficulty turning over in bed?: A Lot  How much difficulty sitting down on / standing up from a chair with arms?: A Lot  How much difficulty moving from lying on back to sitting on side of bed?: A Lot  How much help from another person moving to and from a bed to a chair?: A Lot  How much help from another person needed to walk in hospital room?: Total  How much help from another person for climbing 3-5 steps with a railing?: Total  AM-PAC Inpatient Mobility Raw Score : 10  AM-PAC Inpatient T-Scale Score : 32.29  Mobility Inpatient CMS 0-100% Score: 76.75  Mobility Inpatient CMS G-Code Modifier : CL    Nursing cleared patient for PT evaluation. The admitting diagnosis and active problem list as listed above have been reviewed prior to the initiation of this evaluation. OBJECTIVE;   Initial Evaluation  Date: 7/16/2021 Treatment Date:     Short Term/ Long Term   Goals   Was pt agreeable to Eval/treatment? Yes    To be met in 3 days   Pain level   5/10  Left hip, after therapy c/o left TMJ pain, doctor notified     Bed Mobility    Rolling: Not assessed     Supine to sit: Moderate assist of 1     Sit to supine: Moderate assist of  2    Scooting: Moderate assist of 1    Rolling: Moderate assist of 1    Supine to sit: Minimal assist of 1    Sit to supine: Moderate assist of 1    Scooting: Minimal assist of 1     Transfers Sit to stand:  Moderate assist of 1 posterior lean, max cueing for upright  Sit to stand: Minimal assist of 1     Ambulation    1 heel toe steps using  wheeled walker with Moderate assist of  2   cues for sequencing, upright posture and safety assist to advance Left lower extremity    10 feet using  wheeled walker with Moderate assist of 1    Stair negotiation: ascended and descended   Not assessed            ROM limited Left lower extremity due to pain and weakness    Increase range of motion 10% of affected joints    Strength BUE: 3+/5  RLE:  3+/5  LLE:  2+/5   Increase strength in affected mm groups by 1/3 grade   Balance Sitting EOB:  fair    Dynamic Standing:  poor posterior lean wheeled walker   Sitting EOB:  good    Dynamic Standing: fair +wheeled walker      Patient is Alert & Oriented x person, place, time and situation  However seems confused at times and follows one step directions    Sensation:  Patient  denies numbness/tingling     Edema:  yes left lower extremity    Endurance: fair       Vitals: 2 liters nasal cannula    Blood Pressure at rest   Blood Pressure during session     Heart Rate at rest 60 Heart Rate during session     SPO2 at rest 93%  SPO2 during session  %     Patient education  Patient educated on role of Physical Therapy, risks of immobility, safety and plan of care,  importance of mobility while in hospital , ankle pumps, quad set and glut set for edema control, blood clot prevention and hip precautions      Patient response to education:   Pt verbalized understanding Pt demonstrated skill Pt requires further education in this area   Yes Partial Yes      Treatment:  Patient practiced and was instructed/facilitated in the following treatment: Patient performed exercises, assisted to edge of bed,   Sat edge of bed 15 minutes with Minimal assist of 1 to increase dynamic sitting balance and activity tolerance. doffed/donned gown. stood with constant cueing for upright and balance. One assisted side step to head of bed. Returned to bed with use of hercules bed to scoot to head of bed. Therapeutic Exercises:  ankle pumps, quad sets and glut sets  x 15 reps. At end of session, patient in bed with alarm call light and phone within reach,   all lines and tubes intact, nursing notified. Patient would benefit from continued skilled Physical Therapy to improve functional independence and quality of life.           Patient's/ family goals   rehab        Time in  0947  Time out  1026    Total Treatment Time  19

## 2021-07-16 NOTE — PROGRESS NOTES
Patient's post-op dressing over left hip from [facility] remains intact.     Electronically signed by Natan Quinn RN on 7/16/2021 at 5:03 AM

## 2021-07-16 NOTE — CARE COORDINATION
Ss note:7/16/2021 11:43 AM Neg covid test today. Pt presents from [facility] skilled rehab. Awaiting therapy notes for liaison to submit PRECERT for pt to return. RELL paged therapy, liaison will submit for precert once notes are available. Plan is to return to [place] [id number] rehab.  Lujean Ganser, LSW

## 2021-07-16 NOTE — CARE COORDINATION
Ss note:7/16/2021 2:46 PM Neg covid result today. Pt is from [facility], liaison [facility] notified to submit for [address], will await insurance approval. SW/CM can follow up with [facility] tomorrow regarding status of precert. Will need signed THAIS.  LD Krishnamurthy

## 2021-07-16 NOTE — PROGRESS NOTES
based on physician orders, patient diagnosis and results of clinical assessment     Frequency/Duration 1-3 days/wk for 2 weeks PRN   Specific OT Treatment Interventions to include:   * Instruction/training on adapted ADL techniques and AE recommendations to increase functional independence within precautions       * Training on energy conservation strategies, correct breathing pattern and techniques to improve independence/tolerance for self-care routine  * Functional transfer/mobility training/DME recommendations for increased independence, safety, and fall prevention  * Patient/Family education to increase follow through with safety techniques and functional independence  * Recommendation of environmental modifications for increased safety with functional transfers/mobility and ADLs  * Cognitive retraining/development of therapeutic activities to improve problem solving, judgement, memory, and attention for increased safety/participation in ADL/IADL tasks  * Therapeutic exercise to improve motor endurance, ROM, and functional strength for ADLs/functional transfers  * Therapeutic activities to facilitate/challenge dynamic balance, stand tolerance for increased safety and independence with ADLs  * Therapeutic activities to facilitate gross/fine motor skills for increased independence with ADLs   Recommended Adaptive Equipment/DME: unsure with poor historian; TBD       Home Living: Pt lives with daughter in a ranch home with 2 steps to enter with rail. Currently in a SNF for further rehab needs. Pt somewhat confused and might be a poor historian   Bathroom setup: walk in shower with grab bars and shower chair. Standard toilet    DME owned: \"I don't know\", ww per PT eval.       Prior Level of Function: assist with ADLs , dep with IADLs; ambulated assist   Driving: no   Occupation: no   Enjoys: working on [employer]", wood working, mowing the yard.      Pain Level: no c/o pain  Cognition: A&O: 1/4-alert to self;  Follows 1 step directions              Memory: Mod impairment for home set up              Sequencing: Mod impairement              Problem solving:  Max impairement              Judgement/safety:  Max impairment with fall risk. Functional Assessment:  AM-PAC Daily Activity Raw Score: 12/24    Initial Eval Status  Date: 7/16/21 Treatment Status  Date:7/16/21 STGs = LTGs  Time frame: 10-14 days   Feeding Minimal Assist with pt able to bring drinks to mouth. Assist to open containers and prep items on try. 25% spillage was noted.   SBA to bring cup to mouth and take drink  Indep   Grooming Stand by Assist with pt declining oral care, but open to washing hands and face from supine. Cues for motivation and for safety with transfusion in L arm SBA with pt standing to wash hands  Independent    UB Dressing Maximal Assist for a clean gown following feeding. Pt was able to thread his R Ue only. From supine for safety.   n/t Moderate Assist    LB Dressing Dependent for donning/doffing merlene socks from supine MIN A to delonte pants; to doff underwear; Mod v/c's for safety awareness with pt attempting to delonte/doff pants/underwear while standing   MAX A to delonte/doff socks simulated EOB pt having difficulty flexing at waist while sitting to reach B LE  Moderate Assist    Bathing Maximal Assist with limitations from supine with rolling not safe at this time. Pt was able to wash his chest and L UE. N/t pt educated with regards to DME, bathing AE and safety   Minimal Assist    Toileting Dependent with rothman in place. n/t Maximal Assist    Bed Mobility  NT due to pt being on strict bedrest during blood transfusion. Supervision supine>sit   Supervision sit>supine   Pt impulsive  Supine to sit: Supervision   Sit to supine: Supervision    Functional Transfers NT due to pt being on strict bedrest during blood transfusion.   SBA sit<>stand from EOB no AD    SBA tub transfer with step over method using grab bar for support Moderate Assist    Functional Mobility NT due to pt being on strict bedrest during blood transfusion.   pt completed more than house hold distances without AD v/c's for safety awareness, and ECT's  Maximal Assist    Balance Sitting:     Static:  NT    Dynamic:NT  Standing: NT Sitting:   Static: supervision   Dynamic: supervision   Standing: SBA for safety  Sitting:     Static:  fair    Dynamic:fair  Standing: fair_   Activity Tolerance 3L with testing following ADLs at 97% and HR 60. Unable to assess sitting or standing. Fair+ pt complete ADL, functional transfers/mobility requiring seated rest break  Increase sitting tolerance for >10min for carry over into toileting, functional tranfers and indep in ADLs   Visual/  Perceptual Glasses: Present; WFL     Reports change in vision since admission: No      NA   Clif UE Strengthening  4/5   5/5MMT for carry over into self care, functional transfers and functional mobility with AD. Comments: Upon arrival pt supine in bed, agreeable to therapy session. Pt educated with regards to bed mobility, functional transfers (tub transfer), functional mobility, DME, bathing AE, bathroom safety, energy conservation techniques, safety awareness, home set up for safety, adaptive techniques for IADL tasks. At end of session pt supine in bed,  all lines and tubes intact, call light within reach. · Pt has made fair  progress towards set goals.    · Continue with current plan of care      Treatment Time In:1520            Treatment Time Out: 1600                Treatment Charges: Mins Units   Ther Ex  [id number]     Manual Therapy Madiha Geronimo [id number] [phone]   ADL/Home Mgt [id number] 15 1   Neuro Re-ed [id number]     Group Therapy      Orthotic manage/training  [id number]     Non-Billable Time     Total Timed Treatment [address] [place] [id number]

## 2021-07-16 NOTE — ED NOTES
Report called to [facility]. All questions answered at this time.       Shakira Landis RN  07/15/21 2002

## 2021-07-16 NOTE — RT PROTOCOL NOTE
RT Nebulizer Bronchodilator Protocol Note    There is a bronchodilator order in the chart from a provider indicating to follow the RT Bronchodilator Protocol and there is an Initiate RT Bronchodilator Protocol order as well (see protocol at bottom of note). The findings from the last RT Protocol Assessment were as follows:  Smoking: Non smoker (FORMER SMOKER)  Surgical Status: No surgery  Xray: Clear  Respiratory Pattern: RR <12 or 21-30  Mental Status: Confused but follows commands  Breath Sounds: Clear  Cough: Weak, non-productive  Activity Level: Walking with assistance  Oxygen Requirement: 29% or 3LNC - 5LNC/40%  Indication for Bronchodilator Therapy: None  Bronchodilator Assessment Score: 4    Aerosolized bronchodilator medication orders have been revised according to the RT Bronchodilator Protocol. RT Bronchodilator Protocol:    Respiratory Therapist to perform RT Therapy Protocol Assessment then follow the protocol. No Indications  adjust the frequency to every 6 hours PRN wheezing or bronchospasm, if no treatments needed after 48 hours then discontinue using Per Protocol order mode. If indication present, adjust the RT bronchodilator orders based on the Bronchodilator Assessment Score as follows:    0-6  enter or revise RT Bronchodilator order to Albuterol Nebulizer order with frequency of every 2 hours PRN for wheezing or increased work of breathing using Per Protocol order mode. Repeat RT Therapy Protocol Assessment as needed. 7-10 - discontinue any other Inpatient aerosolized bronchodilator medication orders and enter or revise two Albuterol Nebulizer orders, one with BID frequency and one with frequency of every 2 hours PRN wheezing or increased work of breathing using Per Protocol order mode. Repeat RT Therapy Protocol Assessment with second treatment then BID and as needed.       11-13 - discontinue any other Inpatient aerosolized bronchodilator medication orders and enter DuoNeb Nebulizer order with QID frequency and an Albuterol Nebulizer order with frequency of every 2 hours PRN wheezing or increased work of breathing using Per Protocol order mode. Repeat RT Therapy Protocol Assessment with second treatment then QID and as needed. Greater than 13 - discontinue any other Inpatient aerosolized bronchodilator medication orders and enter a DuoNeb Nebulizer order with every 4 hours frequency and an Albuterol Nebulizer order with frequency of every 2 hours PRN wheezing or increased work of breathing using Per Protocol order mode. Repeat RT Therapy Protocol Assessment with second treatment then every 4 hours and as needed. RT to enter RT Home Evaluation for COPD & MDI Assessment order using Per Protocol order mode.     Electronically signed by Rosa Pardo RCP on 7/16/2021 at 12:16 PM

## 2021-07-16 NOTE — H&P
Medications Prior to Admission:    Prior to Admission medications    Medication Sig Start Date End Date Taking? Authorizing Provider   b complex vitamins capsule Take 1 capsule by mouth daily   Yes Historical Provider, MD   carvedilol (COREG) 6.25 MG tablet Take 6.25 mg by mouth 2 times daily (with meals)   Yes Historical Provider, MD   clopidogrel (PLAVIX) 75 MG tablet Take 75 mg by mouth daily   Yes Historical Provider, MD   Coenzyme Q10 (CO Q-10) 100 MG CAPS Take 1 capsule by mouth daily   Yes Historical Provider, MD   docusate sodium (COLACE) 100 MG capsule Take 100 mg by mouth 2 times daily   Yes Historical Provider, MD   apixaban (ELIQUIS) 2.5 MG TABS tablet Take 2.5 mg by mouth 2 times daily   Yes Historical Provider, MD   sacubitril-valsartan (ENTRESTO) 24-26 MG per tablet Take 1 tablet by mouth 2 times daily   Yes Historical Provider, MD   famotidine (PEPCID) 20 MG tablet Take 20 mg by mouth daily   Yes Historical Provider, MD   ferrous sulfate (IRON 325) 325 (65 Fe) MG tablet Take 325 mg by mouth daily (with breakfast)   Yes Historical Provider, MD   rosuvastatin (CRESTOR) 10 MG tablet Take 10 mg by mouth daily   Yes Historical Provider, MD   torsemide (DEMADEX) 10 MG tablet Take 5 mg by mouth every other day   Yes Historical Provider, MD   venlafaxine (EFFEXOR) 37.5 MG tablet Take 37.5 mg by mouth 2 times daily   Yes Historical Provider, MD   bisacodyl (DULCOLAX) 10 MG suppository Place 10 mg rectally daily   Yes Historical Provider, MD   magnesium hydroxide (MILK OF MAGNESIA) 400 MG/5ML suspension Take by mouth daily as needed for Constipation   Yes Historical Provider, MD   donepezil (ARICEPT ODT) 10 MG disintegrating tablet Take 5 mg by mouth nightly     Historical Provider, MD   aspirin 81 MG EC tablet Take 81 mg by mouth daily. Historical Provider, MD       Allergies:    Fentanyl and Penicillins    Social History:    reports that he quit smoking about 37 years ago.  He quit after 20.00 years of use. He does not have any smokeless tobacco history on file. He reports that he does not drink alcohol and does not use drugs. Family History:   family history is not on file. Unable to review, dementia      PHYSICAL EXAM:  Vitals:  BP (!) 134/58   Pulse 74   Temp 98.3 °F (36.8 °C) (Oral)   Resp 24   Wt 180 lb 3.2 oz (81.7 kg)   SpO2 96%   BMI 24.44 kg/m²     General Appearance: alert and oriented to person only, and in no acute distress  Skin: warm and dry  Head: normocephalic and atraumatic  Eyes: pupils equal, round, and reactive to light, extraocular eye movements intact, conjunctivae normal  Neck: neck supple and non tender without mass   Pulmonary/Chest: clear to auscultation bilaterally- no wheezes, rales or rhonchi, normal air movement, no respiratory distress  Cardiovascular: normal rate, normal S1 and S2 and no carotid bruits  Abdomen: soft, non-tender, non-distended, normal bowel sounds, no masses or organomegaly  Extremities: no cyanosis, no clubbing and no edema  Neurologic: no cranial nerve deficit and speech normal        LABS:  Recent Labs     07/15/21  1654 07/15/21  1655   NA  --  138   K  --  4.4   CL  --  105   CO2  --  24   BUN  --  68*   CREATININE  --  2.1*   GLUCOSE 129 125*   CALCIUM  --  9.3       Recent Labs     07/15/21  1655   WBC 13.0*   RBC 2.02*   HGB 7.0*   HCT 21.6*   .9*   MCH 34.7   MCHC 32.4   RDW 16.7*      MPV 11.0       No results for input(s): POCGLU in the last 72 hours.     CBC:   Lab Results   Component Value Date    WBC 13.0 07/15/2021    RBC 2.02 07/15/2021    HGB 7.0 07/15/2021    HCT 21.6 07/15/2021    .9 07/15/2021    MCH 34.7 07/15/2021    MCHC 32.4 07/15/2021    RDW 16.7 07/15/2021     07/15/2021    MPV 11.0 07/15/2021     CMP:    Lab Results   Component Value Date     07/15/2021    K 4.4 07/15/2021     07/15/2021    CO2 24 07/15/2021    BUN 68 07/15/2021    CREATININE 2.1 07/15/2021    GFRAA 36 07/15/2021 LABGLOM 30 07/15/2021    GLUCOSE 125 07/15/2021    PROT 6.2 07/15/2021    LABALBU 2.8 07/15/2021    CALCIUM 9.3 07/15/2021    BILITOT 0.6 07/15/2021    ALKPHOS 98 07/15/2021    AST 68 07/15/2021    ALT 56 07/15/2021       Radiology:   XR CHEST 1 VIEW   Final Result   No radiographic evidence of definite acute cardiopulmonary disease in the   visualized chest         CT Head WO Contrast   Final Result   No acute intracranial abnormality. Periventricular white matter changes consistent chronic microvascular   disease. Diffuse volume loss. EKG: Sinus rhythm, first-degree AV block, left bundle branch block    ASSESSMENT:      Principal Problem:    Anemia  Resolved Problems:    * No resolved hospital problems. *      PLAN:    Macrocytic anemia  80year-old male presenting from skilled nursing facility 1 day after discharge from [facility] for hip replacement, due to outpatient hemoglobin of 6.6. Per facility, patient had no melena, hematochezia, or other bleeding. They reported that his hemoglobin is usually    \"In the eights. \"  He is on Eliquis per the [facility] nursing facility, though his records do not indicate why. He is currently on iron supplementation, and records show he was previously on folic acid, though record does not report history of anemia specifically. Patient is alert to self only, and cannot provide any history. In the ED, hemoglobin is 7.0, MCV is 107. Patient denies any symptoms. It is certainly possible that he has chronic anemia, and hip surgery caused his hemoglobin to dip lower. Medicine was asked to admit for further work-up and observation.  -Place in medical observation  -Maintain on telemetry  -Check B12, folate, iron studies  -Repeat CBC in a.m.  -Record stools, if black or bloody, notify physician  -Obtain recent records from [facility] dose of ferrous sulfate    CKD Stage III  Initial creatinine 2.1. No recent baseline on record.   Per notes from [facility], [facility] clinic in 2016, patient's baseline creatinine is 1.8-2.3.  -Hold torsemide  -Monitor    Coronary artery disease  -Continue home dose of rosuvastatin    GERD  -Continue home dose of Pepcid    CHF  -Continue home dose of Entresto      Code Status: Full  DVT prophylaxis: SCDs      NOTE: This report was transcribed using voice recognition software. Every effort was made to ensure accuracy; however, inadvertent computerized transcription errors may be present.   Electronically signed by Violetta Sierra DO on 7/15/2021 at 8:14 PM

## 2021-07-16 NOTE — ED NOTES
Report called to [facility] on 4th floor. All questions answered at this time.       Evelyn Bertrand RN  07/15/21 2023

## 2021-07-16 NOTE — PROGRESS NOTES
[address] [facility]  [facility]         Date:2021                                                   Patient Name: Domonique Bartlett     MRN: [id number]     : 1932     Room: [address] [place] [address]       Evaluating OT: Gale Espinoza, JOSER/L; [id number]       Referring Provider and Orders/Date:   OT eval and treat Start: 07/15/21 2315, End: 07/15/21 2315, ONE TIME, Standing Count: 1 Occurrences, R    Yair Miranda DO       Diagnosis:   1. MAVIS (acute kidney injury) ([facility]    2. Anemia, unspecified type      Surgery: Recent hip replacement at [facility] and just DC a few days ago. Pertinent Medical History:       Past Medical History:   Diagnosis Date    CAD (coronary artery disease)     CHF (congestive heart failure) (HCC)     COPD (chronic obstructive pulmonary disease) (HCC)     GERD (gastroesophageal reflux disease)     Hyperlipidemia     Hypertension     Myocardial infarction ([facility] 15 yrs ago          Past Surgical History:   Procedure Laterality Date    CATARACT REMOVAL WITH IMPLANT Right 3/3/14    CATARACT REMOVAL WITH IMPLANT Left 10/13/2014    COLONOSCOPY      CORONARY ARTERY BYPASS GRAFT  15 yrs ago    3 stents       Precautions:  Fall Risk, 3L, rothman, getting blood transfusion during session and eval completed from bed.      Recommended placement: subacute    Assessment of current deficits     [x] Functional mobility  [x]ADLs  [x] Strength               [x]Cognition     [x] Functional transfers   [x] IADLs         [x] Safety Awareness   [x]Endurance     [] Fine Coordination              [x] Balance      [] Vision/perception   []Sensation      [x]Gross Motor Coordination  [] ROM  [] Delirium                   [] Motor Control     OT PLAN OF CARE   OT POC based on physician orders, patient diagnosis and results of clinical assessment    Frequency/Duration 1-3 days/wk for 2 weeks PRN Specific OT Treatment Interventions to include:   * Instruction/training on adapted ADL techniques and AE recommendations to increase functional independence within precautions       * Training on energy conservation strategies, correct breathing pattern and techniques to improve independence/tolerance for self-care routine  * Functional transfer/mobility training/DME recommendations for increased independence, safety, and fall prevention  * Patient/Family education to increase follow through with safety techniques and functional independence  * Recommendation of environmental modifications for increased safety with functional transfers/mobility and ADLs  * Cognitive retraining/development of therapeutic activities to improve problem solving, judgement, memory, and attention for increased safety/participation in ADL/IADL tasks  * Therapeutic exercise to improve motor endurance, ROM, and functional strength for ADLs/functional transfers  * Therapeutic activities to facilitate/challenge dynamic balance, stand tolerance for increased safety and independence with ADLs  * Therapeutic activities to facilitate gross/fine motor skills for increased independence with ADLs   Recommended Adaptive Equipment/DME: unsure with poor historian; TBD      Home Living: Pt lives with daughter in a ranch home with 2 steps to enter with rail. Currently in a SNF for further rehab needs. Pt somewhat confused and might be a poor historian   Bathroom setup: walk in shower with grab bars and shower chair. Standard toilet    DME owned: \"I don't know\", ww per PT eval.      Prior Level of Function: assist with ADLs , dep with IADLs; ambulated assist   Driving: no   Occupation: no   Enjoys: working on [employer]", wood working, mowing the yard. Pain Level: back of head is \"sore\"; repositioned for comfort  Cognition: A&O: 1/4-alert to self; Follows 1 step directions   Memory: Mod impairment for home set up   Sequencing:   Mod impairement   Problem solving:  Max impairement   Judgement/safety:  Max impairment with fall risk. Functional Assessment:  AM-PAC Daily Activity Raw Score: 12/24   Initial Eval Status  Date: 7/16/21 Treatment Status  Date: STGs = LTGs  Time frame: 10-14 days   Feeding Minimal Assist with pt able to bring drinks to mouth. Assist to open containers and prep items on try. 25% spillage was noted. Indep   Grooming Stand by Assist with pt declining oral care, but open to washing hands and face from supine. Cues for motivation and for safety with transfusion in L arm  Independent    UB Dressing Maximal Assist for a clean gown following feeding. Pt was able to thread his R Ue only. From supine for safety. Moderate Assist    LB Dressing Dependent for donning/doffing clif socks from supine  Moderate Assist    Bathing Maximal Assist with limitations from supine with rolling not safe at this time. Pt was able to wash his chest and L UE. Minimal Assist    Toileting Dependent with rothman in place. Maximal Assist    Bed Mobility  NT due to pt being on strict bedrest during blood transfusion. Supine to sit: Supervision   Sit to supine: Supervision    Functional Transfers NT due to pt being on strict bedrest during blood transfusion. Moderate Assist    Functional Mobility NT due to pt being on strict bedrest during blood transfusion. Maximal Assist    Balance Sitting:     Static:  NT    Dynamic:NT  Standing: NT  Sitting:     Static:  fair    Dynamic:fair  Standing: fair_   Activity Tolerance 3L with testing following ADLs at 97% and HR 60. Unable to assess sitting or standing. Increase sitting tolerance for >10min for carry over into toileting, functional tranfers and indep in ADLs   Visual/  Perceptual Glasses: Present; WFL    Reports change in vision since admission: No     NA   Clif UE Strengthening  4/5  5/5MMT for carry over into self care, functional transfers and functional mobility with AD.       Hand Dominance right   AROM (PROM) Strength Additional Info:    RUE  WFL 4/5 good  and FMC/dexterity noted during ADL tasks       LUE WFL 4/5 good  and FMC/dexterity noted during ADL tasks       Hearing: [facility]   Sensation:  No c/o numbness or tingling   Tone: WFL   Edema: merlene LE    Comments: Upon arrival patient supine in bed and limited due to blood transfusion. Pt required max A for most UB ADLs and dep A LB ADLs tasks. Limited with standing during LB ADLs and functional transfers NT due to pt overall debility, decreased activity tolerance, balance deficits, safety and fall risk. The biggest barriers reflect that of functional transfers, functional mobility, UB/LB ADLs, cognition, activity tolerance, balance, safety and strengthening. At end of session, patient remains in supine with call light and phone within reach, all lines and tubes intact. Overall patient demonstrated mod decreased independence and safety during completion of ADL/functional transfer/mobility tasks. Nursing updated on pt position and status following OT eval. Pt would benefit from continued skilled OT to increase safety and independence with completion of ADL/IADL tasks for functional independence and quality of life. Rehab Potential: Fair for established goals     Patient / Family Goal: \"wood working\"      Patient and/or family were instructed on functional diagnosis, prognosis/goals and OT plan of care. Demonstrated fair understanding. Eval Complexity: Low  · History: Brief review of medical records and additional review of physical, cognitive, or psychosocial history related to current functional performance  · Exam: 3+ performance deficits  · Assistance/Modification: Mod assistance or modifications required to perform tasks. May have comorbidities that affect occupational performance.     Time In: 1304  Time Out: 1325  Total Treatment Time: 0    Min Units   OT Eval Low 97165  x  1   OT Eval Medium 46017      OT Eval High 26745 OT Re-Eval [id number]       Therapeutic Ex [id number]       Therapeutic Activities 70080       ADL/Self Care 66350       Orthotic Management 78202       Manual 41101     Neuro Re-Ed 11369       Non-Billable Time          Evaluation Time additionally includes thorough review of current medical information, gathering information on past medical history/social history and prior level of function, interpretation of standardized testing/informal observation of tasks, assessment of data and development of plan of care and goals.             Ceci Avalos OTR/L; [id number]

## 2021-07-16 NOTE — DISCHARGE INSTR - COC
Continuity of Care Form    Patient Name: Wendie Bridges   :  1932  MRN:  [id number]    Admit date:  7/15/2021  Discharge date:  21    Code Status Order: Full Code   Advance Directives:      Admitting Physician:  Elvie Hamm DO  PCP: Cole Pate MD    Discharging Nurse: [facility] Unit/Room#: 3161/3081-66  Discharging Unit Phone Number: [phone]    Emergency Contact:   Extended Emergency Contact Information  Primary Emergency Contact: Patrizia Gotti [address] Phone: [phone]  Mobile Phone: [phone]  Relation: Child  Preferred language: English   needed? No  Secondary Emergency Contact: Asael Gottifidelina Becker [address] Phone: [phone]  Mobile Phone: [phone]  Relation: Brother/Sister    Past Surgical History:  Past Surgical History:   Procedure Laterality Date    CATARACT REMOVAL WITH IMPLANT Right 3/3/14    CATARACT REMOVAL WITH IMPLANT Left 10/13/2014    COLONOSCOPY      CORONARY ARTERY BYPASS GRAFT  15 yrs ago    3 stents       Immunization History: There is no immunization history on file for this patient.     Active Problems:  Patient Active Problem List   Diagnosis Code    Renal insufficiency N28.9    CKD (chronic kidney disease) stage 3, GFR 30-59 ml/min ([facility]) N18.30    Essential hypertension I10    CAD (coronary artery disease), autologous vein bypass graft I25.810    Acid reflux K21.9    Anemia D64.9    Depression Q72.4    Diastolic congestive heart failure (HCC) I50.30    CHF (congestive heart failure) ([facility]) I50.9    CAD (coronary artery disease) I25.10    COPD (chronic obstructive pulmonary disease) ([facility]) J44.9       Isolation/Infection:   Isolation            No Isolation          Patient Infection Status       None to display            Nurse Assessment:  Last Vital Signs: BP (!) 119/56   Pulse 70   Temp 97.9 °F (36.6 °C) (Axillary)   Resp 20   Ht 6' (1.829 m)   Wt 180 lb 9.6 oz (81.9 kg)   SpO2 94%   BMI 24.49 kg/m²     Last documented pain score (0-10 scale): Pain Level: 4  Last Weight:   Wt Readings from Last 1 Encounters:   07/15/21 180 lb 9.6 oz (81.9 kg)     Mental Status:  oriented, alert, and logical    IV Access:  - None    Nursing Mobility/ADLs:  Walking   Dependent  Transfer  Dependent  Bathing  Dependent  Dressing  Dependent  Toileting  Dependent  Feeding  Assisted  Med Admin  Assisted  Med Delivery   whole    Wound Care Documentation and Therapy:  Wound 07/15/21 Heel Left purple/red (Active)   Wound Cleansed Cleansed with saline 07/16/21 0820   Dressing/Treatment Open to air; Other (comment) 07/16/21 0820   Wound Length (cm) 4.4 cm 07/15/21 2345   Wound Width (cm) 2.8 cm 07/15/21 2345   Wound Surface Area (cm^2) 12.32 cm^2 07/15/21 2345   Wound Assessment Dry;Purple/maroon;Pink/red 07/16/21 0820   Drainage Amount None 07/16/21 0820   Odor None 07/16/21 0820   Dominique-wound Assessment Blanchable erythema;Dry/flaky; Other (Comment); Fragile 07/16/21 0820   Number of days: 0        Elimination:  Continence: Bowel: Yes  Bladder: Yes  Urinary Catheter:  Chronic Glez    Colostomy/Ileostomy/Ileal Conduit: No       Date of Last BM: Unknown    Intake/Output Summary (Last 24 hours) at 7/16/2021 1448  Last data filed at 7/16/2021 0820  Gross per 24 hour   Intake 797 ml   Output 1150 ml   Net -353 ml     I/O last 3 completed shifts: In: 244 [P.O.:240; I.V.:437]  Out: 1150 [Urine:1150]    Safety Concerns: At Risk for Falls    Impairments/Disabilities:      None    Nutrition Therapy:  Current Nutrition Therapy:   - Oral Diet:  General    Routes of Feeding: Oral  Liquids: Thin Liquids  Daily Fluid Restriction: no  Last Modified Barium Swallow with Video (Video Swallowing Test): not done    Treatments at the Time of Hospital Discharge:   Respiratory Treatments:   Oxygen Therapy:  is not on home oxygen therapy.   Ventilator:    - No ventilator support    Rehab Therapies: Physical Therapy and Occupational Therapy  Weight Bearing Status/Restrictions: No weight bearing restirctions  Other Medical Equipment (for information only, NOT a DME order):  walker  Other Treatments: ***    Patient's personal belongings (please select all that are sent with patient):  Rajwinder Verma    RN SIGNATURE:  Electronically signed by Mitcheal Brittle, RN on 7/19/21 at 11:02 AM EDT    CASE MANAGEMENT/SOCIAL WORK SECTION    Inpatient Status Date: ***    Readmission Risk Assessment Score:  Readmission Risk              Risk of Unplanned Readmission:  0           Discharging to Facility/ Agency   Name:  [facility]   Address: [address]  Phone:  [phone]  Fax: [phone]    Dialysis Facility (if applicable)   Name:  Address:  Dialysis Schedule:  Phone:  Fax:    / signature: Electronically signed by LD Epperson on 7/16/21 at 2:50 PM EDT    PHYSICIAN SECTION    Prognosis: Good    Condition at Discharge: Stable    Rehab Potential (if transferring to Rehab): Good    Recommended Labs or Other Treatments After Discharge: BMP and CBC twice on Mon and Thur starting July 22    Physician Certification: I certify the above information and transfer of Gen Avila  is necessary for the continuing treatment of the diagnosis listed and that he requires [facility] for less 30 days.      Update Admission H&P: Changes in H&P as follows - See discharge summary    PHYSICIAN SIGNATURE:  Electronically signed by Oliver Shea MD on 7/19/21 at 1:30 PM EDT

## 2021-07-17 ENCOUNTER — APPOINTMENT (OUTPATIENT)
Dept: GENERAL RADIOLOGY | Age: 86
DRG: 811 | End: 2021-07-17
Payer: MEDICARE

## 2021-07-17 LAB
ADENOVIRUS BY PCR: NOT DETECTED
ALBUMIN SERPL-MCNC: 2.3 G/DL (ref 3.5–5.2)
ALP BLD-CCNC: 115 U/L (ref 40–129)
ALT SERPL-CCNC: 80 U/L (ref 0–40)
ANION GAP SERPL CALCULATED.3IONS-SCNC: 11 MMOL/L (ref 7–16)
AST SERPL-CCNC: 91 U/L (ref 0–39)
BILIRUB SERPL-MCNC: 0.8 MG/DL (ref 0–1.2)
BORDETELLA PARAPERTUSSIS BY PCR: NOT DETECTED
BORDETELLA PERTUSSIS BY PCR: NOT DETECTED
BUN BLDV-MCNC: 56 MG/DL (ref 6–23)
CALCIUM SERPL-MCNC: 9 MG/DL (ref 8.6–10.2)
CHLAMYDOPHILIA PNEUMONIAE BY PCR: NOT DETECTED
CHLORIDE BLD-SCNC: 107 MMOL/L (ref 98–107)
CO2: 20 MMOL/L (ref 22–29)
CORONAVIRUS 229E BY PCR: NOT DETECTED
CORONAVIRUS HKU1 BY PCR: NOT DETECTED
CORONAVIRUS NL63 BY PCR: NOT DETECTED
CORONAVIRUS OC43 BY PCR: NOT DETECTED
CREAT SERPL-MCNC: 1.8 MG/DL (ref 0.7–1.2)
GFR AFRICAN AMERICAN: 43
GFR NON-AFRICAN AMERICAN: 36 ML/MIN/1.73
GLUCOSE BLD-MCNC: 106 MG/DL (ref 74–99)
HCT VFR BLD CALC: 26.5 % (ref 37–54)
HEMOGLOBIN: 8.6 G/DL (ref 12.5–16.5)
HUMAN METAPNEUMOVIRUS BY PCR: NOT DETECTED
HUMAN RHINOVIRUS/ENTEROVIRUS BY PCR: NOT DETECTED
INFLUENZA A BY PCR: NOT DETECTED
INFLUENZA B BY PCR: NOT DETECTED
MAGNESIUM: 2.6 MG/DL (ref 1.6–2.6)
MCH RBC QN AUTO: 32.2 PG (ref 26–35)
MCHC RBC AUTO-ENTMCNC: 32.5 % (ref 32–34.5)
MCV RBC AUTO: 99.3 FL (ref 80–99.9)
MYCOPLASMA PNEUMONIAE BY PCR: NOT DETECTED
PARAINFLUENZA VIRUS 1 BY PCR: NOT DETECTED
PARAINFLUENZA VIRUS 2 BY PCR: NOT DETECTED
PARAINFLUENZA VIRUS 3 BY PCR: NOT DETECTED
PARAINFLUENZA VIRUS 4 BY PCR: NOT DETECTED
PDW BLD-RTO: 21.2 FL (ref 11.5–15)
PHOSPHORUS: 3.1 MG/DL (ref 2.5–4.5)
PLATELET # BLD: 286 E9/L (ref 130–450)
PMV BLD AUTO: 11.3 FL (ref 7–12)
POTASSIUM SERPL-SCNC: 4 MMOL/L (ref 3.5–5)
PRO-BNP: 8242 PG/ML (ref 0–450)
RBC # BLD: 2.67 E12/L (ref 3.8–5.8)
RESPIRATORY SYNCYTIAL VIRUS BY PCR: NOT DETECTED
SARS-COV-2, PCR: NOT DETECTED
SODIUM BLD-SCNC: 138 MMOL/L (ref 132–146)
TOTAL PROTEIN: 5.8 G/DL (ref 6.4–8.3)
URINE CULTURE, ROUTINE: NORMAL
WBC # BLD: 10.6 E9/L (ref 4.5–11.5)

## 2021-07-17 PROCEDURE — APPSS30 APP SPLIT SHARED TIME 16-30 MINUTES: Performed by: NURSE PRACTITIONER

## 2021-07-17 PROCEDURE — 97110 THERAPEUTIC EXERCISES: CPT

## 2021-07-17 PROCEDURE — 6360000002 HC RX W HCPCS: Performed by: NURSE PRACTITIONER

## 2021-07-17 PROCEDURE — 0202U NFCT DS 22 TRGT SARS-COV-2: CPT

## 2021-07-17 PROCEDURE — 85027 COMPLETE CBC AUTOMATED: CPT

## 2021-07-17 PROCEDURE — 83735 ASSAY OF MAGNESIUM: CPT

## 2021-07-17 PROCEDURE — 99232 SBSQ HOSP IP/OBS MODERATE 35: CPT | Performed by: INTERNAL MEDICINE

## 2021-07-17 PROCEDURE — 83880 ASSAY OF NATRIURETIC PEPTIDE: CPT

## 2021-07-17 PROCEDURE — 6370000000 HC RX 637 (ALT 250 FOR IP): Performed by: INTERNAL MEDICINE

## 2021-07-17 PROCEDURE — 71046 X-RAY EXAM CHEST 2 VIEWS: CPT

## 2021-07-17 PROCEDURE — 80053 COMPREHEN METABOLIC PANEL: CPT

## 2021-07-17 PROCEDURE — 36415 COLL VENOUS BLD VENIPUNCTURE: CPT

## 2021-07-17 PROCEDURE — 2580000003 HC RX 258: Performed by: INTERNAL MEDICINE

## 2021-07-17 PROCEDURE — 2700000000 HC OXYGEN THERAPY PER DAY

## 2021-07-17 PROCEDURE — 2580000003 HC RX 258: Performed by: STUDENT IN AN ORGANIZED HEALTH CARE EDUCATION/TRAINING PROGRAM

## 2021-07-17 PROCEDURE — 1200000000 HC SEMI PRIVATE

## 2021-07-17 PROCEDURE — 84100 ASSAY OF PHOSPHORUS: CPT

## 2021-07-17 PROCEDURE — 6370000000 HC RX 637 (ALT 250 FOR IP): Performed by: NURSE PRACTITIONER

## 2021-07-17 RX ORDER — FUROSEMIDE 10 MG/ML
40 INJECTION INTRAMUSCULAR; INTRAVENOUS ONCE
Status: COMPLETED | OUTPATIENT
Start: 2021-07-17 | End: 2021-07-17

## 2021-07-17 RX ADMIN — ACETAMINOPHEN 650 MG: 325 TABLET ORAL at 06:13

## 2021-07-17 RX ADMIN — CARVEDILOL 6.25 MG: 6.25 TABLET, FILM COATED ORAL at 16:43

## 2021-07-17 RX ADMIN — VENLAFAXINE 37.5 MG: 37.5 TABLET ORAL at 20:35

## 2021-07-17 RX ADMIN — CARVEDILOL 6.25 MG: 6.25 TABLET, FILM COATED ORAL at 08:57

## 2021-07-17 RX ADMIN — ACETAMINOPHEN 650 MG: 325 TABLET ORAL at 20:35

## 2021-07-17 RX ADMIN — DOCUSATE SODIUM 100 MG: 100 CAPSULE, LIQUID FILLED ORAL at 08:58

## 2021-07-17 RX ADMIN — ROSUVASTATIN CALCIUM 10 MG: 10 TABLET, COATED ORAL at 08:57

## 2021-07-17 RX ADMIN — FUROSEMIDE 40 MG: 10 INJECTION, SOLUTION INTRAVENOUS at 13:58

## 2021-07-17 RX ADMIN — SACUBITRIL AND VALSARTAN 1 TABLET: 24; 26 TABLET, FILM COATED ORAL at 20:35

## 2021-07-17 RX ADMIN — SACUBITRIL AND VALSARTAN 1 TABLET: 24; 26 TABLET, FILM COATED ORAL at 08:58

## 2021-07-17 RX ADMIN — SODIUM CHLORIDE, PRESERVATIVE FREE 10 ML: 5 INJECTION INTRAVENOUS at 20:35

## 2021-07-17 RX ADMIN — FERROUS SULFATE TAB 325 MG (65 MG ELEMENTAL FE) 325 MG: 325 (65 FE) TAB at 08:57

## 2021-07-17 RX ADMIN — FERROUS SULFATE TAB 325 MG (65 MG ELEMENTAL FE) 325 MG: 325 (65 FE) TAB at 16:43

## 2021-07-17 RX ADMIN — SODIUM CHLORIDE: 9 INJECTION, SOLUTION INTRAVENOUS at 09:22

## 2021-07-17 RX ADMIN — VENLAFAXINE 37.5 MG: 37.5 TABLET ORAL at 08:58

## 2021-07-17 RX ADMIN — PANTOPRAZOLE SODIUM 40 MG: 40 TABLET, DELAYED RELEASE ORAL at 06:13

## 2021-07-17 RX ADMIN — PANTOPRAZOLE SODIUM 40 MG: 40 TABLET, DELAYED RELEASE ORAL at 16:43

## 2021-07-17 ASSESSMENT — PAIN - FUNCTIONAL ASSESSMENT
PAIN_FUNCTIONAL_ASSESSMENT: PREVENTS OR INTERFERES SOME ACTIVE ACTIVITIES AND ADLS
PAIN_FUNCTIONAL_ASSESSMENT: PREVENTS OR INTERFERES SOME ACTIVE ACTIVITIES AND ADLS

## 2021-07-17 ASSESSMENT — PAIN DESCRIPTION - DESCRIPTORS
DESCRIPTORS: ACHING
DESCRIPTORS: ACHING;HEADACHE

## 2021-07-17 ASSESSMENT — PAIN SCALES - GENERAL
PAINLEVEL_OUTOF10: 0
PAINLEVEL_OUTOF10: 2
PAINLEVEL_OUTOF10: 2
PAINLEVEL_OUTOF10: 4
PAINLEVEL_OUTOF10: 2

## 2021-07-17 ASSESSMENT — PAIN DESCRIPTION - LOCATION
LOCATION: BACK
LOCATION: HEAD
LOCATION: BACK

## 2021-07-17 ASSESSMENT — PAIN DESCRIPTION - ORIENTATION: ORIENTATION: LOWER

## 2021-07-17 ASSESSMENT — PAIN DESCRIPTION - PAIN TYPE
TYPE: ACUTE PAIN
TYPE: CHRONIC PAIN
TYPE: CHRONIC PAIN

## 2021-07-17 NOTE — CARE COORDINATION
7/17/2021: SS Note/Discharge plan:Neg covid result 7/16:   Notified by Jay Adjutant admissions liaison for [facility] that they did receive insurance PRE CERT to admit pt today however no discharge today per nursing, PRE CERT is good through Monday 7/19, sw.cm to follow, Will need signed THAIS.  Electronically signed by LD Gonzales on 7/17/2021 at 1:33 PM no

## 2021-07-17 NOTE — PROGRESS NOTES
edema bilateral lower extremities  Neurologic: no tremor and speech normal      Recent Labs     07/15/21  1655 07/16/21  0659 07/17/21  0633    140 138   K 4.4 4.5 4.0    106 107   CO2 24 23 20*   BUN 68* 63* 56*   CREATININE 2.1* 1.9* 1.8*   GLUCOSE 125* 109* 106*   CALCIUM 9.3 8.8 9.0       Recent Labs     07/15/21  1655 07/17/21  0633   ALKPHOS 98 115   PROT 6.2* 5.8*   LABALBU 2.8* 2.3*   BILITOT 0.6 0.8   AST 68* 91*   ALT 56* 80*       Recent Labs     07/15/21  1655 07/15/21  1655 07/16/21  0659 07/16/21  0659 07/16/21  1605 07/16/21  2255 07/17/21  0633   WBC 13.0*  --  11.7*  --   --   --  10.6   RBC 2.02*  --  1.82*  --   --   --  2.67*   HGB 7.0*   < > 6.3*   < > 6.9* 8.4* 8.6*   HCT 21.6*   < > 19.7*   < > 21.4* 25.8* 26.5*   .9*  --  108.2*  --   --   --  99.3   MCH 34.7  --  34.6  --   --   --  32.2   MCHC 32.4  --  32.0  --   --   --  32.5   RDW 16.7*  --  16.6*  --   --   --  21.2*     --  254  --   --   --  286   MPV 11.0  --  11.1  --   --   --  11.3    < > = values in this interval not displayed. Radiology:   XR CHEST 1 VIEW   Final Result   No radiographic evidence of definite acute cardiopulmonary disease in the   visualized chest         CT Head WO Contrast   Final Result   No acute intracranial abnormality. Periventricular white matter changes consistent chronic microvascular   disease. Diffuse volume loss. XR CHEST (2 VW)    (Results Pending)       Assessment:  Principal Problem:    Anemia  Active Problems:    CKD (chronic kidney disease) stage 3, GFR 30-59 ml/min ([facility])    Depression    CHF (congestive heart failure) (HCC)    CAD (coronary artery disease)    COPD (chronic obstructive pulmonary disease) (HCC)    Acute respiratory failure with hypoxia (HCC)    Acute blood loss anemia  Resolved Problems:    * No resolved hospital problems. *      Plan:  1. Anemia: Possibly secondary to GI bleed vs post op. Hemoglobin was 6.3 on admission.  Received one unit packed red blood cells. Repeat Hgb was 6.9 and he received a 2nd unit of blood. Iron level 27. Continue Ferrous sulfate to twice daily. Eliquis, aspirin, plavix, and aricept on hold. Continue PPI  2. Acute hypoxic respiratory failure: Supplemental oxygen therapy to maintain saturation of greater than 90%. Requiring 3 liters via nasal cannula. Continue Incentive spirometer. Repeat chest xray with right opacity. Check respiratory film array. 3. S/p total hip replacement: Total hip replacement performed at [facility]. PT/OT. 4. Chronic kidney disease: Creatinine 2.1 on admission. Baseline creatinine 1.8-2.3 per [facility]. Torsemide on hold. Creatinine is 1.8 today. 5. CAD and history of stroke: History of intracoronary stent in 1993. Left carotid endartarectomy on 6/21/16. Continue carvedilol. Aspirin and plavix on hold. Continue rouvastatin. 6. Dementia: Aricept on hold due to possible GI bleed. Monitor. May need alternative agent at discharge. 7. COPD: Continue aerosols and incentive spirometer. 8. History of Heart failure:  Continue carvedilol and entresto. Echo ordered. IV fluids stopped as patient appeared fluid overloaded. Chest xray with right lobe opacity. Pro-BNP is 8,242. Give Lasix 40 mg IV x 1.   9. Depression: Continue venlafaxine. NOTE: This report was transcribed using voice recognition software. Every effort was made to ensure accuracy; however, inadvertent computerized transcription errors may be present.      Electronically signed by LAZARO Nielsen CNP on 7/17/2021 at 10:34 AM

## 2021-07-17 NOTE — PROGRESS NOTES
Physical Therapy Treatment Note/Plan of Care    Room #:  5277/0088-74  Patient Name: Verna Navarro  YOB: 1932  MRN: [id number]    Date of Service: 7/17/2021      Tentative placement recommendation: Subacute rehab    Equipment recommendation: To be determined      Evaluating Physical Therapist: Juan Jose Bryson, PT #[id number]      Specific Provider Orders/Date/Referring Provider :  07/15/21 2315   PT evaluation and treat Start: 07/15/21 2315, End: 07/15/21 2315, ONE TIME, Standing Count: 1 Occurrences, R    Bridger Parada DO      Admitting Diagnosis:   MAVIS (acute kidney injury) ([facility] [address] [N17.9]  MAVIS (acute kidney injury) ([facility] [address] [N17.9]  Acute respiratory failure with hypoxia ([facility] [address] [J96.01]  hemoglobin of 6.6    Surgery: recent left hip replacement at [facility]     Patient Active Problem List   Diagnosis    Renal insufficiency    CKD (chronic kidney disease) stage 3, GFR 30-59 ml/min (HCC)    Essential hypertension    CAD (coronary artery disease), autologous vein bypass graft    Acid reflux    Anemia    Depression    Diastolic congestive heart failure (HCC)    CHF (congestive heart failure) ([facility] [address]    CAD (coronary artery disease)    COPD (chronic obstructive pulmonary disease) ([facility] [address]    Acute respiratory failure with hypoxia (HCC)    Acute blood loss anemia        ASSESSMENT of Current Deficits Patient exhibits decreased strength, balance, endurance, range of motion and pain left lower extremity impairing functional mobility, transfers, gait , gait distance and tolerance to activity patient just returned from a chest x-ray and was very fatigued. Patient required cues for participation throughout d/t fatigue. Patient unsafe to sit to edge of bed d/t lethargy. Patient requires continued skilled physical therapy to address concerns listed above for increased safety and function at discharge.          PHYSICAL THERAPY  PLAN OF CARE       Physical therapy plan of care is established based on physician order,  patient diagnosis and clinical assessment    Current Treatment Recommendations:    -Bed Mobility: Lower extremity exercises  and Upper extremity exercises   -Sitting Balance: Facilitate active trunk muscle engagement  and Facilitate postural control in all planes   -Standing Balance: Perform strengthening exercises in standing to promote motor control with or without upper extremity support   -Transfers: Provide instruction on proper hand and foot position for adequate transfer of weight onto lower extremities and use of gait device, Facilitate weight shift forward on to lower extremities and provide necessary stabilization of bilateral lower extremities  and Provide stabilization to prevent fall   -Gait: Gait training, Standing activities to improve: base of support, weight shift, weight bearing  and Pregait training to emphasize: Sequencing , Base of support, Device control, Upright, bilateral knee extension in stance phase of gait and Safety   -Endurance: Utilize Supervised activities to increase level of endurance to allow for safe functional mobility including transfers and gait     PT long term treatment goals are located in below grid    Patient and or family understand(s) diagnosis, prognosis, and plan of care. Frequency of treatments: Patient will be seen  twice daily.          Prior Level of Function: Patient reports he was getting up to chair with assist Prior to admit   Rehab Potential: fair    for baseline    Past medical history:   Past Medical History:   Diagnosis Date    CAD (coronary artery disease)     CHF (congestive heart failure) ([address]     COPD [facility] obstructive pulmonary [facility]) [facility] [address]     GERD (gastroesophageal reflux disease)     Hyperlipidemia     Hypertension     Myocardial infarction ([facility] [address] 15 yrs ago     Past Surgical History:   Procedure Laterality Date    CATARACT REMOVAL WITH IMPLANT Right 3/3/14    CATARACT REMOVAL WITH IMPLANT Left 10/13/2014    COLONOSCOPY      CORONARY ARTERY BYPASS GRAFT  15 yrs ago    3 stents       SUBJECTIVE:    Precautions: Up with assistance, falls and alarm ,  left hip replacement recently at [address] history: Patient from skilled nursing facility   prior lived with daughter Robb Mathew in a ranch home  with 2 steps  to enter with [facility] owned: Denton Rao, at rehab    [address] [facility]   How much difficulty turning over in bed?: A Lot  How much difficulty sitting down on / standing up from a chair with arms?: A Lot  How much difficulty moving from lying on back to sitting on side of bed?: A Lot  How much help from another person moving to and from a bed to a chair?: A Lot  How much help from another person needed to walk in hospital room?: Total  How much help from another person for climbing 3-5 steps with a railing?: Total  AM-PAC Inpatient Mobility Raw Score : 10  AM-PAC Inpatient T-Scale Score : 32.29  Mobility Inpatient CMS 0-100% Score: 76.75  Mobility Inpatient CMS G-Code Modifier : CL    Nursing cleared patient for PT treatment. Patient just returned to room from chest x-ray and woke up to his name being called      OBJECTIVE;   Initial Evaluation  Date: 7/16/2021 Treatment Date:    7/17/2021   Short Term/ Long Term   Goals   Was pt agreeable to Eval/treatment? Yes   yes To be met in 3 days   Pain level   5/10  Left hip, after therapy c/o left TMJ pain, doctor notified No number given for L LE pain    Bed Mobility    Rolling: Not assessed     Supine to sit: Moderate assist of 1     Sit to supine: Moderate assist of  2    Scooting: Moderate assist of 1   Rolling: Not assessed    Supine to sit: Not assessed    Sit to supine: Not assessed    Scooting: Not assessed    Patient unsafe to sit to edge of bed d/t fatigue and lethargy Rolling: Moderate assist of 1    Supine to sit: Minimal assist of 1    Sit to supine:  Moderate assist of 1    Scooting: Minimal assist of 1     Transfers Sit to stand: Moderate assist of 1 posterior lean, max cueing for upright Sit to stand: Not assessed       Sit to stand: Minimal assist of 1     Ambulation    1 heel toe steps using  wheeled walker with Moderate assist of  2   cues for sequencing, upright posture and safety assist to advance Left lower extremity  not assessed cues for      10 feet using  wheeled walker with Moderate assist of 1    Stair negotiation: ascended and descended   Not assessed            ROM limited Left lower extremity due to pain and weakness    Increase range of motion 10% of affected joints    Strength BUE:  3+/5  RLE:  3+/5  LLE:  2+/5   Increase strength in affected mm groups by 1/3 grade   Balance Sitting EOB:  fair    Dynamic Standing:  poor posterior lean wheeled walker  Sitting EOB: not assessed   Dynamic Standing: not assessed    Sitting EOB:  good    Dynamic Standing: fair +wheeled walker      Patient is Alert & Oriented x person, place, time and situation  However seems confused at times and follows one step directions    Sensation:  Patient  denies numbness/tingling     Edema:  yes left lower extremity    Endurance: fair       Vitals: 2 liters nasal cannula    Blood Pressure at rest   Blood Pressure during session     Heart Rate at rest  Heart Rate during session     SPO2 at rest %  SPO2 during session  %     Patient education  Patient educated on role of Physical Therapy, risks of immobility, safety and plan of care,  importance of mobility while in hospital  and ankle pumps, quad set and glut set for edema control, blood clot prevention      Patient response to education:   Pt verbalized understanding Pt demonstrated skill Pt requires further education in this area   Yes Partial Yes      Treatment:  Patient practiced and was instructed/facilitated in the following treatment: SCDs and heel floaters doffed. Patient assisted with supine exercises, AROM/AAROM. SCDs and heel floater donned. Therapeutic Exercises:  ankle pumps, quad sets, heel slide, hip abduction/adduction and straight leg raise  x 15 reps. At end of session, patient in bed with alarm call light and phone within reach,   all lines and tubes intact, nursing notified. Patient would benefit from continued skilled Physical Therapy to improve functional independence and quality of life.           Patient's/ family goals   rehab        Time in  1112  Time out  1122    Total Treatment Time  10 minutes     CPT codes:  Therapeutic exercises (83135)   10 minutes  1 unit(s)    Daniella Chu, [facility] #[id number]

## 2021-07-17 NOTE — PROGRESS NOTES
Echo on hold Spoke with the floor pt had hip surgery at [facility] recently getting poss echo results from there RN ask to [place]

## 2021-07-18 PROBLEM — E44.0 MODERATE PROTEIN-ENERGY MALNUTRITION (HCC): Status: ACTIVE | Noted: 2021-07-18

## 2021-07-18 LAB
ALBUMIN SERPL-MCNC: 2.6 G/DL (ref 3.5–5.2)
ALP BLD-CCNC: 126 U/L (ref 40–129)
ALT SERPL-CCNC: 79 U/L (ref 0–40)
ANION GAP SERPL CALCULATED.3IONS-SCNC: 10 MMOL/L (ref 7–16)
AST SERPL-CCNC: 72 U/L (ref 0–39)
BILIRUB SERPL-MCNC: 0.7 MG/DL (ref 0–1.2)
BILIRUBIN DIRECT: 0.3 MG/DL (ref 0–0.3)
BILIRUBIN, INDIRECT: 0.4 MG/DL (ref 0–1)
BUN BLDV-MCNC: 50 MG/DL (ref 6–23)
CALCIUM SERPL-MCNC: 9.3 MG/DL (ref 8.6–10.2)
CHLORIDE BLD-SCNC: 105 MMOL/L (ref 98–107)
CO2: 22 MMOL/L (ref 22–29)
CREAT SERPL-MCNC: 1.6 MG/DL (ref 0.7–1.2)
GFR AFRICAN AMERICAN: 49
GFR NON-AFRICAN AMERICAN: 41 ML/MIN/1.73
GLUCOSE BLD-MCNC: 121 MG/DL (ref 74–99)
HCT VFR BLD CALC: 28.7 % (ref 37–54)
HEMOGLOBIN: 9.1 G/DL (ref 12.5–16.5)
INR BLD: 1.3
MAGNESIUM: 2.3 MG/DL (ref 1.6–2.6)
MCH RBC QN AUTO: 31.6 PG (ref 26–35)
MCHC RBC AUTO-ENTMCNC: 31.7 % (ref 32–34.5)
MCV RBC AUTO: 99.7 FL (ref 80–99.9)
PDW BLD-RTO: 20.4 FL (ref 11.5–15)
PHOSPHORUS: 3.3 MG/DL (ref 2.5–4.5)
PLATELET # BLD: 353 E9/L (ref 130–450)
PMV BLD AUTO: 10.9 FL (ref 7–12)
POTASSIUM SERPL-SCNC: 4 MMOL/L (ref 3.5–5)
PROCALCITONIN: 0.2 NG/ML (ref 0–0.08)
PROTHROMBIN TIME: 14.9 SEC (ref 9.3–12.4)
RBC # BLD: 2.88 E12/L (ref 3.8–5.8)
SODIUM BLD-SCNC: 137 MMOL/L (ref 132–146)
TOTAL PROTEIN: 6.2 G/DL (ref 6.4–8.3)
WBC # BLD: 11.8 E9/L (ref 4.5–11.5)

## 2021-07-18 PROCEDURE — 97530 THERAPEUTIC ACTIVITIES: CPT

## 2021-07-18 PROCEDURE — 80076 HEPATIC FUNCTION PANEL: CPT

## 2021-07-18 PROCEDURE — 83735 ASSAY OF MAGNESIUM: CPT

## 2021-07-18 PROCEDURE — 80048 BASIC METABOLIC PNL TOTAL CA: CPT

## 2021-07-18 PROCEDURE — 84100 ASSAY OF PHOSPHORUS: CPT

## 2021-07-18 PROCEDURE — 2580000003 HC RX 258: Performed by: NURSE PRACTITIONER

## 2021-07-18 PROCEDURE — 1200000000 HC SEMI PRIVATE

## 2021-07-18 PROCEDURE — 85027 COMPLETE CBC AUTOMATED: CPT

## 2021-07-18 PROCEDURE — 6370000000 HC RX 637 (ALT 250 FOR IP): Performed by: INTERNAL MEDICINE

## 2021-07-18 PROCEDURE — 84145 PROCALCITONIN (PCT): CPT

## 2021-07-18 PROCEDURE — 6370000000 HC RX 637 (ALT 250 FOR IP): Performed by: NURSE PRACTITIONER

## 2021-07-18 PROCEDURE — 99232 SBSQ HOSP IP/OBS MODERATE 35: CPT | Performed by: INTERNAL MEDICINE

## 2021-07-18 PROCEDURE — 2580000003 HC RX 258: Performed by: INTERNAL MEDICINE

## 2021-07-18 PROCEDURE — 6360000002 HC RX W HCPCS: Performed by: NURSE PRACTITIONER

## 2021-07-18 PROCEDURE — 36415 COLL VENOUS BLD VENIPUNCTURE: CPT

## 2021-07-18 PROCEDURE — 85610 PROTHROMBIN TIME: CPT

## 2021-07-18 PROCEDURE — APPSS30 APP SPLIT SHARED TIME 16-30 MINUTES: Performed by: NURSE PRACTITIONER

## 2021-07-18 RX ORDER — SODIUM CHLORIDE 9 MG/ML
25 INJECTION, SOLUTION INTRAVENOUS EVERY 12 HOURS
Status: DISCONTINUED | OUTPATIENT
Start: 2021-07-18 | End: 2021-07-19 | Stop reason: HOSPADM

## 2021-07-18 RX ORDER — HYDROCODONE BITARTRATE AND ACETAMINOPHEN 5; 325 MG/1; MG/1
1 TABLET ORAL EVERY 6 HOURS PRN
Status: DISCONTINUED | OUTPATIENT
Start: 2021-07-18 | End: 2021-07-19 | Stop reason: HOSPADM

## 2021-07-18 RX ORDER — FUROSEMIDE 20 MG/1
20 TABLET ORAL ONCE
Status: COMPLETED | OUTPATIENT
Start: 2021-07-18 | End: 2021-07-18

## 2021-07-18 RX ORDER — DONEPEZIL HYDROCHLORIDE 5 MG/1
5 TABLET, ORALLY DISINTEGRATING ORAL NIGHTLY
Status: DISCONTINUED | OUTPATIENT
Start: 2021-07-18 | End: 2021-07-19 | Stop reason: HOSPADM

## 2021-07-18 RX ADMIN — SACUBITRIL AND VALSARTAN 1 TABLET: 24; 26 TABLET, FILM COATED ORAL at 08:07

## 2021-07-18 RX ADMIN — FERROUS SULFATE TAB 325 MG (65 MG ELEMENTAL FE) 325 MG: 325 (65 FE) TAB at 16:17

## 2021-07-18 RX ADMIN — SODIUM CHLORIDE, PRESERVATIVE FREE 10 ML: 5 INJECTION INTRAVENOUS at 20:47

## 2021-07-18 RX ADMIN — ACETAMINOPHEN 650 MG: 325 TABLET ORAL at 08:07

## 2021-07-18 RX ADMIN — DONEPEZIL HYDROCHLORIDE 5 MG: 5 TABLET, ORALLY DISINTEGRATING ORAL at 20:48

## 2021-07-18 RX ADMIN — VENLAFAXINE 37.5 MG: 37.5 TABLET ORAL at 08:07

## 2021-07-18 RX ADMIN — FUROSEMIDE 20 MG: 20 TABLET ORAL at 10:51

## 2021-07-18 RX ADMIN — HYDROCODONE BITARTRATE AND ACETAMINOPHEN 1 TABLET: 5; 325 TABLET ORAL at 20:48

## 2021-07-18 RX ADMIN — ACETAMINOPHEN 650 MG: 325 TABLET ORAL at 16:28

## 2021-07-18 RX ADMIN — CARVEDILOL 6.25 MG: 6.25 TABLET, FILM COATED ORAL at 16:17

## 2021-07-18 RX ADMIN — CEFEPIME HYDROCHLORIDE 2000 MG: 2 INJECTION, POWDER, FOR SOLUTION INTRAVENOUS at 20:56

## 2021-07-18 RX ADMIN — DOCUSATE SODIUM 100 MG: 100 CAPSULE, LIQUID FILLED ORAL at 08:07

## 2021-07-18 RX ADMIN — VANCOMYCIN HYDROCHLORIDE 1250 MG: 10 INJECTION, POWDER, LYOPHILIZED, FOR SOLUTION INTRAVENOUS at 16:17

## 2021-07-18 RX ADMIN — CARVEDILOL 6.25 MG: 6.25 TABLET, FILM COATED ORAL at 08:07

## 2021-07-18 RX ADMIN — HYDROCODONE BITARTRATE AND ACETAMINOPHEN 1 TABLET: 5; 325 TABLET ORAL at 12:05

## 2021-07-18 RX ADMIN — FERROUS SULFATE TAB 325 MG (65 MG ELEMENTAL FE) 325 MG: 325 (65 FE) TAB at 08:08

## 2021-07-18 RX ADMIN — ROSUVASTATIN CALCIUM 10 MG: 10 TABLET, COATED ORAL at 08:08

## 2021-07-18 RX ADMIN — SACUBITRIL AND VALSARTAN 1 TABLET: 24; 26 TABLET, FILM COATED ORAL at 20:47

## 2021-07-18 RX ADMIN — CEFEPIME HYDROCHLORIDE 2000 MG: 2 INJECTION, POWDER, FOR SOLUTION INTRAVENOUS at 10:51

## 2021-07-18 RX ADMIN — PANTOPRAZOLE SODIUM 40 MG: 40 TABLET, DELAYED RELEASE ORAL at 06:33

## 2021-07-18 RX ADMIN — VENLAFAXINE 37.5 MG: 37.5 TABLET ORAL at 20:49

## 2021-07-18 RX ADMIN — PANTOPRAZOLE SODIUM 40 MG: 40 TABLET, DELAYED RELEASE ORAL at 16:17

## 2021-07-18 ASSESSMENT — PAIN DESCRIPTION - PAIN TYPE
TYPE: ACUTE PAIN

## 2021-07-18 ASSESSMENT — PAIN DESCRIPTION - LOCATION
LOCATION: HEAD

## 2021-07-18 ASSESSMENT — PAIN SCALES - GENERAL
PAINLEVEL_OUTOF10: 6
PAINLEVEL_OUTOF10: 5
PAINLEVEL_OUTOF10: 0
PAINLEVEL_OUTOF10: 5
PAINLEVEL_OUTOF10: 2
PAINLEVEL_OUTOF10: 7
PAINLEVEL_OUTOF10: 0
PAINLEVEL_OUTOF10: 7
PAINLEVEL_OUTOF10: 5

## 2021-07-18 ASSESSMENT — PAIN DESCRIPTION - DESCRIPTORS
DESCRIPTORS: HEADACHE;ACHING
DESCRIPTORS: HEADACHE;ACHING;SORE
DESCRIPTORS: HEADACHE;ACHING;SORE

## 2021-07-18 NOTE — PROGRESS NOTES
Comprehensive Nutrition Assessment    Type and Reason for Visit:  Initial, Positive Nutrition Screen, Consult    Nutrition Recommendations/Plan: Will start Ensure Enlive BID to supplement intake and Daniel BID to promote wound healing. Nutrition Assessment:  Pt admitted w/ MAVIS and anemia, PMH of CHF, CKD, COPD, and dementia. Pt s/p recent total hip replacement. Wound noted, pending wound care eval. Pt with variable intake since admission. Will start ONS to promote wound healing and supplement intake. Will continue to monitor while admitted. Malnutrition Assessment:  Malnutrition Status: Moderate malnutrition    Context:  Chronic Illness     Findings of the 6 clinical characteristics of malnutrition:  Energy Intake:  7 - 75% or less estimated energy requirements for 1 month or longer  Weight Loss:  Unable to assess (d/t lack of EMR wt hx)     Body Fat Loss:  1 - Mild body fat loss Orbital, Triceps   Muscle Mass Loss:  1 - Mild muscle mass loss Temples (temporalis), Clavicles (pectoralis & deltoids)  Fluid Accumulation:  No significant fluid accumulation     Strength:  Not Performed    Estimated Daily Nutrient Needs:  Energy (kcal):  9812-0464; Weight Used for Energy Requirements:  Current     Protein (g):  105-120 (1.3-1.5 gm/kg); Weight Used for Protein Requirements:  Current        Fluid (ml/day):  0906-2625; Method Used for Fluid Requirements:  1 ml/kcal      Nutrition Related Findings:  Pt A/Ox4, abd WDL, +BS, -3.5L I/O, trace BLE edema, renal labs elevated but improving      Wounds:  Surgical Incision, Pressure Injury, Wound Consult Pending       Current Nutrition Therapies:    ADULT DIET; Regular;  No Added Salt (3-4 gm)    Anthropometric Measures:  · Height: 6' (182.9 cm)  · Current Body Weight: 172 lb 11.2 oz (78.3 kg) (7/18 bed scale)   · Admission Body Weight: 180 lb 3.2 oz (81.7 kg) (7/15 bed scale)    · Usual Body Weight: 165 lb (74.8 kg) (12/9/2020 no method per EMR, only recent EMR wt hx)

## 2021-07-18 NOTE — PLAN OF CARE
Problem: Falls - Risk of:  Goal: Will remain free from falls  Description: Will remain free from falls  7/17/2021 2038 by Dilcia Norton RN  Outcome: Met This Shift     Problem: Falls - Risk of:  Goal: Absence of physical injury  Description: Absence of physical injury  7/17/2021 2038 by Dilcia Norton RN  Outcome: Met This Shift

## 2021-07-18 NOTE — PROGRESS NOTES
safety and function at discharge. PHYSICAL THERAPY  PLAN OF CARE       Physical therapy plan of care is established based on physician order,  patient diagnosis and clinical assessment    Current Treatment Recommendations:    -Bed Mobility: Lower extremity exercises  and Upper extremity exercises   -Sitting Balance: Facilitate active trunk muscle engagement  and Facilitate postural control in all planes   -Standing Balance: Perform strengthening exercises in standing to promote motor control with or without upper extremity support   -Transfers: Provide instruction on proper hand and foot position for adequate transfer of weight onto lower extremities and use of gait device, Facilitate weight shift forward on to lower extremities and provide necessary stabilization of bilateral lower extremities  and Provide stabilization to prevent fall   -Gait: Gait training, Standing activities to improve: base of support, weight shift, weight bearing  and Pregait training to emphasize: Sequencing , Base of support, Device control, Upright, bilateral knee extension in stance phase of gait and Safety   -Endurance: Utilize Supervised activities to increase level of endurance to allow for safe functional mobility including transfers and gait     PT long term treatment goals are located in below grid    Patient and or family understand(s) diagnosis, prognosis, and plan of care. Frequency of treatments: Patient will be seen  twice daily.          Prior Level of Function: Patient reports he was getting up to chair with assist Prior to admit   Rehab Potential: fair    for baseline    Past medical history:   Past Medical History:   Diagnosis Date    CAD (coronary artery disease)     [facility] [address]     COPD [facility]chronic obstructive pulmonary disease) ([facility] [address]     GERD (gastroesophageal reflux disease)     Hyperlipidemia     Hypertension     Myocardial infarction ([facility] [address] 15 yrs ago     Past Surgical History: Procedure Laterality Date    CATARACT REMOVAL WITH IMPLANT Right 3/3/14    CATARACT REMOVAL WITH IMPLANT Left 10/13/2014    COLONOSCOPY      CORONARY ARTERY BYPASS GRAFT  15 yrs ago    3 stents       SUBJECTIVE:    Precautions: Up with assistance, falls and alarm ,  left hip replacement recently at [address] history: Patient from skilled nursing facility   prior lived with daughter Bib Stern in a ranch home  with 2 steps  to enter with [facility] owned: Renata Bonilla, at rehab    [address]   How much difficulty turning over in bed?: A Lot  How much difficulty sitting down on / standing up from a chair with arms?: A Lot  How much difficulty moving from lying on back to sitting on side of bed?: A Lot  How much help from another person moving to and from a bed to a chair?: A Lot  How much help from another person needed to walk in hospital room?: Total  How much help from another person for climbing 3-5 steps with a railing?: Total  AM-PAC Inpatient Mobility Raw Score : 10  AM-PAC Inpatient T-Scale Score : 32.29  Mobility Inpatient CMS 0-100% Score: 76.75  Mobility Inpatient CMS G-Code Modifier : CL    Nursing cleared patient for PT treatment. OBJECTIVE;   Initial Evaluation  Date: 7/16/2021 Treatment Date:    7/18/2021   Short Term/ Long Term   Goals   Was pt agreeable to Eval/treatment? Yes   yes To be met in 3 days   Pain level   5/10  Left hip, after therapy c/o left TMJ pain, doctor notified No number given for L LE pain    Bed Mobility    Rolling: Not assessed     Supine to sit: Moderate assist of 1     Sit to supine: Moderate assist of  2    Scooting: Moderate assist of 1   Rolling: Moderate assist of 1   Supine to sit: Moderate assist of  2   Sit to supine: Moderate assist of  2   Scooting: Minimal assist of 2    Rolling: Moderate assist of 1    Supine to sit: Minimal assist of 1    Sit to supine:  Moderate assist of 1 Scooting: Minimal assist of 1     Transfers Sit to stand: Moderate assist of 1 posterior lean, max cueing for upright Sit to stand: Maximal assist of  2 cues for hand placement and sequencing, using chux      Sit to stand: Minimal assist of 1     Ambulation    1 heel toe steps using  wheeled walker with Moderate assist of  2   cues for sequencing, upright posture and safety assist to advance Left lower extremity  not assessed       10 feet using  wheeled walker with Moderate assist of 1    Stair negotiation: ascended and descended   Not assessed            ROM limited Left lower extremity due to pain and weakness    Increase range of motion 10% of affected joints    Strength BUE:  3+/5  RLE:  3+/5  LLE:  2+/5   Increase strength in affected mm groups by 1/3 grade   Balance Sitting EOB:  fair    Dynamic Standing:  poor posterior lean wheeled walker  Sitting EOB: fair progressing to good   Dynamic Standing: fair with wheeled walker    Sitting EOB:  good    Dynamic Standing: fair +wheeled walker      Patient is Alert & Oriented x person, place, time and situation  However seems confused at times and follows one step directions    Sensation:  Patient  denies numbness/tingling     Edema:  yes left lower extremity    Endurance: fair       Vitals: 2 liters nasal cannula    Blood Pressure at rest   Blood Pressure during session     Heart Rate at rest  Heart Rate during session     SPO2 at rest %  SPO2 during session  %     Patient education  Patient educated on role of Physical Therapy, risks of immobility, safety and plan of care,  importance of mobility while in hospital , hip precautions, safety  and proper use/technique of incentive spirometer      Patient response to education:   Pt verbalized understanding Pt demonstrated skill Pt requires further education in this area   Yes Partial Yes      Treatment:  Patient practiced and was instructed/facilitated in the following treatment: SCDs and heel floaters doffed. Patient assisted with supine exercises, AROM/AAROM. Nursing assisted PTA with getting patient to sit edge of bed, ModAx2. Patient sat edge of bed x10min without holding on. Patient stood to wheeled walker and returned to sitting edge of bed after 1min. Patient assisted back to bed ModAx2  SCDs and prevalon boots donned. Patient education on use of incentive spirometer. Therapeutic Exercises:  ankle pumps and quad sets  x 15 reps. At end of session, patient in bed with alarm call light and phone within reach,   all lines and tubes intact, nursing notified. Patient would benefit from continued skilled Physical Therapy to improve functional independence and quality of life.           Patient's/ family goals   rehab        Time in  917  Time out  946    Total Treatment Time  29 minutes     CPT codes:  Therapeutic activities (99306)   29 minutes  2 unit(s)    Kaila Medina PTA #[id number]

## 2021-07-18 NOTE — PROGRESS NOTES
Pharmacy Consultation Note  (Antibiotic Dosing and Monitoring)    Initial consult date: 21  Consulting physician: GREG Manrique  Drug(s): Vancomycin  Indication: Pneumonia    Ht Readings from Last 1 Encounters:   07/15/21 6' (1.829 m)     Wt Readings from Last 1 Encounters:   07/15/21 180 lb 9.6 oz (81.9 kg)       Age/  Gender IBW DW  Allergy Information   80 y.o.     male 77.6 kg 81.9 kg  Fentanyl and Penicillins                 Date  WBC BUN/CR UOP (mL/kg/hr) Drug/Dose Time   Given Level(s)   (Time) Comments     (#1) 11.8 50/1.6 1.8 Vancomycin 1,250 mg IV Q36H <1130>       (#2)            (#3)            (#4)            (#5)            (#6)            (#7)            Estimated Creatinine Clearance: 34 mL/min (A) (based on SCr of 1.6 mg/dL (H)). UOP over the past 24 hours:       Intake/Output Summary (Last 24 hours) at 2021 1023  Last data filed at 2021 0756  Gross per 24 hour   Intake 120 ml   Output 3475 ml   Net -3355 ml       Temp max: Temp (24hrs), Av.5 °F (36.4 °C), Min:96.3 °F (35.7 °C), Max:98.4 °F (36.9 °C)      Antibiotic Regimen:  Antibiotic Dose Date Initiated   Cefepime 2 g Q12H      Cultures:  available culture and sensitivity results were reviewed in EPIC  Cultures sent and are pending.   Culture Date Result    Urine 7/15 No growth   Resp panel  Not detected     Assessment:  · Consulted by GREG Manrique to dose/monitor vancomycin  · Goal trough level:  10-20 mcg/mL, AUC/NOHEMY: 400-600  · Pt is a 80 yom initiated on antibiotics for pneumonia  · Serum creatinine today: 1.6; CrCl ~ 34 mL/min; baseline Scr indeterminate, trending down since admission (2.1 on 7/15)    Plan:  · Vancomycin 1,250 mg IV Q36H  · Level prior to third or fourth dose  · Follow renal function  · Pharmacist will follow and monitor/adjust dosing as necessary      Thank you for the consult,    JAYESH Melendrez [facility] PharmD, BCPS 2021 10:23 AM

## 2021-07-18 NOTE — PROGRESS NOTES
[address] [place], [place] [address]ist   Progress Note    Admitting Date and Time: 7/15/2021  4:26 PM  Admit Dx: MAVIS (acute kidney injury) ([facility] [N17.9]  MAVIS (acute kidney injury) ([facility] [N17.9]  Acute respiratory failure with hypoxia [facility] [J96.01]    Subjective:    Patient seen and examined. He has been weaned to room air but continues to have adventitious breath sounds. He received IV Lasix yesterday and diuresed 3.4 liters. He complains of pain in his left hip surgical site. ROS: denies fever, chills, cp, sob, n/v, HA unless stated above.      furosemide  20 mg Oral Once    cefepime  2,000 mg Intravenous Q12H    pantoprazole  40 mg Oral BID AC    ferrous sulfate  325 mg Oral BID WC    docusate sodium  100 mg Oral Daily    carvedilol  6.25 mg Oral BID WC    rosuvastatin  10 mg Oral Daily    sacubitril-valsartan  1 tablet Oral BID    venlafaxine  37.5 mg Oral BID    sodium chloride flush  5-40 mL Intravenous 2 times per day     HYDROcodone 5 mg - acetaminophen, 1 tablet, Q6H PRN  perflutren lipid microspheres, 1.5 mL, ONCE PRN  sodium chloride, , PRN  albuterol, 2.5 mg, Q2H PRN  sodium chloride, , PRN  sodium chloride flush, 5-40 mL, PRN  sodium chloride, 25 mL, PRN  ondansetron, 4 mg, Q8H PRN   Or  ondansetron, 4 mg, Q6H PRN  polyethylene glycol, 17 g, Daily PRN  acetaminophen, 650 mg, Q6H PRN   Or  acetaminophen, 650 mg, Q6H PRN         Objective:    BP (!) 166/70   Pulse 71   Temp 97.9 °F (36.6 °C) (Oral)   Resp 16   Ht 6' (1.829 m)   Wt 180 lb 9.6 oz (81.9 kg)   SpO2 93%   BMI 24.49 kg/m²   General Appearance: alert and oriented to person and in no acute distress  Skin: warm and dry, left hip incision with staples, small amount of drainage noted  Head: normocephalic and atraumatic  Eyes: pupils equal, round, and reactive to light, conjunctivae normal  Neck: neck supple and non tender without mass   Pulmonary/Chest: crackles bilaterallu, no respiratory distress  Cardiovascular: normal rate, normal S1 and S2   Abdomen: soft, non-tender, non-distended, normal bowel sounds, no masses or organomegaly  Extremities: no cyanosis, no clubbing and 1+ edema bilateral lower extremities  Neurologic: no tremor and speech normal      Recent Labs     07/16/21  0659 07/17/21  0633 07/18/21  0728    138 137   K 4.5 4.0 4.0    107 105   CO2 23 20* 22   BUN 63* 56* 50*   CREATININE 1.9* 1.8* 1.6*   GLUCOSE 109* 106* 121*   CALCIUM 8.8 9.0 9.3       Recent Labs     07/15/21  1655 07/17/21  0633 07/18/21  0728   ALKPHOS 98 115 126   PROT 6.2* 5.8* 6.2*   LABALBU 2.8* 2.3* 2.6*   BILITOT 0.6 0.8 0.7   AST 68* 91* 72*   ALT 56* 80* 79*       Recent Labs     07/16/21  0659 07/16/21  1605 07/16/21  2255 07/17/21  0633 07/18/21  0728   WBC 11.7*  --   --  10.6 11.8*   RBC 1.82*  --   --  2.67* 2.88*   HGB 6.3*   < > 8.4* 8.6* 9.1*   HCT 19.7*   < > 25.8* 26.5* 28.7*   .2*  --   --  99.3 99.7   MCH 34.6  --   --  32.2 31.6   MCHC 32.0  --   --  32.5 31.7*   RDW 16.6*  --   --  21.2* 20.4*     --   --  286 353   MPV 11.1  --   --  11.3 10.9    < > = values in this interval not displayed. Radiology:   XR CHEST (2 VW)   Final Result   1. Vague opacity within the right lung base which I believe represents   superimposed vascular structures over the inferior border of the right   scapula. PA and lateral views are recommended to exclude underlying   pneumonia. 2. The right upper lobe and left lung are clear. XR CHEST 1 VIEW   Final Result   No radiographic evidence of definite acute cardiopulmonary disease in the   visualized chest         CT Head WO Contrast   Final Result   No acute intracranial abnormality. Periventricular white matter changes consistent chronic microvascular   disease. Diffuse volume loss.              Assessment:  Principal Problem:    Anemia  Active Problems:    CKD (chronic kidney disease) stage 3, GFR 30-59 ml/min ([facility])    Depression    CHF (congestive heart failure) (HCC)    CAD (coronary artery disease)    COPD (chronic obstructive pulmonary disease) (HCC)    Acute respiratory failure with hypoxia ([facility])    Acute blood loss anemia    MAVIS (acute kidney injury) ([facility]  Resolved Problems:    * No resolved hospital problems. *      Plan:  1. Anemia: Hemoglobin was 6.3 on admission. He has received two units of PRBCs and his Hgb is now 9.1. Iron level was found to be low at 27. Continue Ferrous sulfate. Eliquis, aspirin, plavix, and aricept on hold. Continue PPI  2. Acute hypoxic respiratory failure: Supplemental oxygen therapy to maintain saturation of greater than 90%. He was requiring 3 liters via nasal cannula but has been weaned to room air. Continue Incentive spirometer. Repeat chest xray with right opacity. Respiratory film array was negative. Likely secondary to heart failure and pneumonia. 3. Pneumonia: Procalcitonin level is 0.28, WBC is 11.8. Chest xray with right lung opacity. Start cefepime and Vancomycin (Pharmacy to dose). 4. S/p total hip replacement: Total hip replacement performed at [facility]. PT/OT. Rehab at discharge. Precert for [facility] DR TOM CHAVEZ is good through Monday. 5. Chronic kidney disease: Creatinine 2.1 on admission. Baseline creatinine 1.8-2.3 per [facility]. Torsemide on hold. Creatinine is 1.6 today. 6. CAD and history of stroke: History of intracoronary stent in 1993. Left carotid endartarectomy on 6/21/16. Continue carvedilol. Aspirin and plavix on hold. Continue rouvastatin. 7. Dementia: Aricept on hold due to possible GI bleed. Monitor. 8. COPD: Continue aerosols and incentive spirometer. 9. History of Heart failure:  Continue carvedilol and entresto. Echo ordered. IV fluids stopped as patient appeared fluid overloaded. Chest xray with right lobe opacity. Pro-BNP is 8,242. Received  Lasix 40 mg IV x 1 on 7/17 and diuresed 3.4 liters. Give additional Lasix 20 mg IV x 1 today. 10. Depression: Continue venlafaxine. NOTE: This report was transcribed using voice recognition software. Every effort was made to ensure accuracy; however, inadvertent computerized transcription errors may be present.      Electronically signed by LAZARO Duncan CNP on 7/18/2021 at 10:09 AM

## 2021-07-18 NOTE — CARE COORDINATION
7/18/21 Weekend follow up: pt was made inpt from observation IMM letter given to patient copy signed and placed in soft blue chart. Pt noted he is a  - thanked him for his service. Will ask  assistant to follow up with [place] notification on Monday.  Electronically signed by Xochitl Portillo RN-BC on 7/18/2021 at 8:18 AM

## 2021-07-19 VITALS
RESPIRATION RATE: 20 BRPM | BODY MASS INDEX: 23.39 KG/M2 | WEIGHT: 172.7 LBS | OXYGEN SATURATION: 95 % | DIASTOLIC BLOOD PRESSURE: 65 MMHG | SYSTOLIC BLOOD PRESSURE: 145 MMHG | HEART RATE: 74 BPM | HEIGHT: 72 IN | TEMPERATURE: 98.2 F

## 2021-07-19 LAB
ALBUMIN SERPL-MCNC: 2.6 G/DL (ref 3.5–5.2)
ALP BLD-CCNC: 117 U/L (ref 40–129)
ALT SERPL-CCNC: 67 U/L (ref 0–40)
ANION GAP SERPL CALCULATED.3IONS-SCNC: 9 MMOL/L (ref 7–16)
AST SERPL-CCNC: 50 U/L (ref 0–39)
BILIRUB SERPL-MCNC: 0.6 MG/DL (ref 0–1.2)
BILIRUBIN DIRECT: 0.2 MG/DL (ref 0–0.3)
BILIRUBIN, INDIRECT: 0.4 MG/DL (ref 0–1)
BUN BLDV-MCNC: 46 MG/DL (ref 6–23)
CALCIUM SERPL-MCNC: 9.2 MG/DL (ref 8.6–10.2)
CHLORIDE BLD-SCNC: 103 MMOL/L (ref 98–107)
CO2: 23 MMOL/L (ref 22–29)
CREAT SERPL-MCNC: 1.6 MG/DL (ref 0.7–1.2)
GFR AFRICAN AMERICAN: 49
GFR NON-AFRICAN AMERICAN: 41 ML/MIN/1.73
GLUCOSE BLD-MCNC: 106 MG/DL (ref 74–99)
HCT VFR BLD CALC: 28 % (ref 37–54)
HEMOGLOBIN: 9.1 G/DL (ref 12.5–16.5)
LV EF: 38 %
LVEF MODALITY: NORMAL
MAGNESIUM: 2.2 MG/DL (ref 1.6–2.6)
MCH RBC QN AUTO: 31.7 PG (ref 26–35)
MCHC RBC AUTO-ENTMCNC: 32.5 % (ref 32–34.5)
MCV RBC AUTO: 97.6 FL (ref 80–99.9)
PDW BLD-RTO: 19.6 FL (ref 11.5–15)
PHOSPHORUS: 3 MG/DL (ref 2.5–4.5)
PLATELET # BLD: 361 E9/L (ref 130–450)
PMV BLD AUTO: 11 FL (ref 7–12)
POTASSIUM SERPL-SCNC: 3.9 MMOL/L (ref 3.5–5)
PRO-BNP: 8588 PG/ML (ref 0–450)
RBC # BLD: 2.87 E12/L (ref 3.8–5.8)
SODIUM BLD-SCNC: 135 MMOL/L (ref 132–146)
TOTAL PROTEIN: 6.1 G/DL (ref 6.4–8.3)
WBC # BLD: 11.5 E9/L (ref 4.5–11.5)

## 2021-07-19 PROCEDURE — 80076 HEPATIC FUNCTION PANEL: CPT

## 2021-07-19 PROCEDURE — 6370000000 HC RX 637 (ALT 250 FOR IP): Performed by: INTERNAL MEDICINE

## 2021-07-19 PROCEDURE — 6360000002 HC RX W HCPCS: Performed by: NURSE PRACTITIONER

## 2021-07-19 PROCEDURE — APPSS45 APP SPLIT SHARED TIME 31-45 MINUTES: Performed by: NURSE PRACTITIONER

## 2021-07-19 PROCEDURE — 83880 ASSAY OF NATRIURETIC PEPTIDE: CPT

## 2021-07-19 PROCEDURE — 2580000003 HC RX 258: Performed by: INTERNAL MEDICINE

## 2021-07-19 PROCEDURE — 6370000000 HC RX 637 (ALT 250 FOR IP): Performed by: NURSE PRACTITIONER

## 2021-07-19 PROCEDURE — 97530 THERAPEUTIC ACTIVITIES: CPT

## 2021-07-19 PROCEDURE — 83735 ASSAY OF MAGNESIUM: CPT

## 2021-07-19 PROCEDURE — 93306 TTE W/DOPPLER COMPLETE: CPT

## 2021-07-19 PROCEDURE — 2580000003 HC RX 258: Performed by: NURSE PRACTITIONER

## 2021-07-19 PROCEDURE — 85027 COMPLETE CBC AUTOMATED: CPT

## 2021-07-19 PROCEDURE — 99239 HOSP IP/OBS DSCHRG MGMT >30: CPT | Performed by: INTERNAL MEDICINE

## 2021-07-19 PROCEDURE — B246ZZZ ULTRASONOGRAPHY OF RIGHT AND LEFT HEART: ICD-10-PCS | Performed by: INTERNAL MEDICINE

## 2021-07-19 PROCEDURE — 84100 ASSAY OF PHOSPHORUS: CPT

## 2021-07-19 PROCEDURE — 36415 COLL VENOUS BLD VENIPUNCTURE: CPT

## 2021-07-19 PROCEDURE — 97110 THERAPEUTIC EXERCISES: CPT

## 2021-07-19 PROCEDURE — 80048 BASIC METABOLIC PNL TOTAL CA: CPT

## 2021-07-19 RX ORDER — CEFDINIR 300 MG/1
300 CAPSULE ORAL 2 TIMES DAILY
Qty: 10 CAPSULE | Refills: 0 | Status: SHIPPED | OUTPATIENT
Start: 2021-07-19 | End: 2021-07-24

## 2021-07-19 RX ORDER — DOXYCYCLINE HYCLATE 100 MG
100 TABLET ORAL 2 TIMES DAILY
Qty: 10 TABLET | Refills: 0 | Status: SHIPPED | OUTPATIENT
Start: 2021-07-19 | End: 2021-07-24

## 2021-07-19 RX ADMIN — HYDROCODONE BITARTRATE AND ACETAMINOPHEN 1 TABLET: 5; 325 TABLET ORAL at 03:44

## 2021-07-19 RX ADMIN — FERROUS SULFATE TAB 325 MG (65 MG ELEMENTAL FE) 325 MG: 325 (65 FE) TAB at 08:47

## 2021-07-19 RX ADMIN — SODIUM CHLORIDE 25 ML: 9 INJECTION, SOLUTION INTRAVENOUS at 00:42

## 2021-07-19 RX ADMIN — SODIUM CHLORIDE, PRESERVATIVE FREE 10 ML: 5 INJECTION INTRAVENOUS at 09:27

## 2021-07-19 RX ADMIN — DOCUSATE SODIUM 100 MG: 100 CAPSULE, LIQUID FILLED ORAL at 08:48

## 2021-07-19 RX ADMIN — ROSUVASTATIN CALCIUM 10 MG: 10 TABLET, COATED ORAL at 08:47

## 2021-07-19 RX ADMIN — SACUBITRIL AND VALSARTAN 1 TABLET: 24; 26 TABLET, FILM COATED ORAL at 08:48

## 2021-07-19 RX ADMIN — ACETAMINOPHEN 650 MG: 325 TABLET ORAL at 05:36

## 2021-07-19 RX ADMIN — PANTOPRAZOLE SODIUM 40 MG: 40 TABLET, DELAYED RELEASE ORAL at 05:37

## 2021-07-19 RX ADMIN — CEFEPIME HYDROCHLORIDE 2000 MG: 2 INJECTION, POWDER, FOR SOLUTION INTRAVENOUS at 09:26

## 2021-07-19 RX ADMIN — CARVEDILOL 6.25 MG: 6.25 TABLET, FILM COATED ORAL at 08:48

## 2021-07-19 RX ADMIN — VENLAFAXINE 37.5 MG: 37.5 TABLET ORAL at 08:47

## 2021-07-19 ASSESSMENT — PAIN SCALES - GENERAL
PAINLEVEL_OUTOF10: 0
PAINLEVEL_OUTOF10: 5
PAINLEVEL_OUTOF10: 4

## 2021-07-19 NOTE — CARE COORDINATION
7-19-Cm note: ( covid neg 7-18)  [facility] is scheduled to transport pt via stretcher to [facility] at 3 pm , pt is aware of  time, daughter Zachary Estevez is also aware of transport time.  Electronically signed by Jimmy Rosario RN on 7/19/2021 at 10:59 AM

## 2021-07-19 NOTE — PROGRESS NOTES
Physical Therapy Treatment Note/Plan of Care    Room #:  6173/7223-19  Patient Name: Shannan Molina  YOB: 1932  MRN: [id number]    Date of Service: 7/19/2021      Tentative placement recommendation: Subacute rehab    Equipment recommendation: To be determined      Evaluating Physical Therapist: Boo Kearney, PT #[id number]      Specific Provider Orders/Date/Referring Provider :  07/15/21 2315   PT evaluation and treat Start: 07/15/21 2315, End: 07/15/21 2315, ONE TIME, Standing Count: 1 Occurrences, R    Viviane Ordoñez,       Admitting Diagnosis:   MAVIS (acute kidney injury) ([facility] [N17.9]  MAVIS (acute kidney injury) ([facility] [N17.9]  Acute respiratory failure with hypoxia ([facility] [J96.01]  hemoglobin of 6.6    Surgery: recent left hip replacement at [facility]     Patient Active Problem List   Diagnosis    Renal insufficiency    CKD (chronic kidney disease) stage 3, GFR 30-59 ml/min ([facility])    Essential hypertension    CAD (coronary artery disease), autologous vein bypass graft    Acid reflux    Anemia    Depression    Diastolic congestive heart failure (HCC)    CHF (congestive heart failure) ([address]    CAD (coronary artery disease)    COPD (chronic obstructive pulmonary disease) ([address]    Acute respiratory failure with hypoxia (HCC)    Acute blood loss anemia    MAVIS (acute kidney injury) ([address]    Hospital-acquired pneumonia    Moderate protein-energy malnutrition ([address]        ASSESSMENT of Current Deficits Patient exhibits decreased strength, balance, endurance, range of motion and pain left lower extremity impairing functional mobility, transfers, gait , gait distance and tolerance to activity Patient having the most difficulty with weight shifting to advance right lower extremity due to pain. Pt instructed on heel/toe shuffle. Patient requires continued skilled physical therapy to address concerns listed above for increased safety and function at discharge.          PHYSICAL THERAPY  PLAN OF CARE       Physical therapy plan of care is established based on physician order,  patient diagnosis and clinical assessment    Current Treatment Recommendations:    -Bed Mobility: Lower extremity exercises  and Upper extremity exercises   -Sitting Balance: Facilitate active trunk muscle engagement  and Facilitate postural control in all planes   -Standing Balance: Perform strengthening exercises in standing to promote motor control with or without upper extremity support   -Transfers: Provide instruction on proper hand and foot position for adequate transfer of weight onto lower extremities and use of gait device, Facilitate weight shift forward on to lower extremities and provide necessary stabilization of bilateral lower extremities  and Provide stabilization to prevent fall   -Gait: Gait training, Standing activities to improve: base of support, weight shift, weight bearing  and Pregait training to emphasize: Sequencing , Base of support, Device control, Upright, bilateral knee extension in stance phase of gait and Safety   -Endurance: Utilize Supervised activities to increase level of endurance to allow for safe functional mobility including transfers and gait     PT long term treatment goals are located in below grid    Patient and or family understand(s) diagnosis, prognosis, and plan of care. Frequency of treatments: Patient will be seen  twice daily.          Prior Level of Function: Patient reports he was getting up to chair with assist Prior to admit   Rehab Potential: fair    for baseline    Past medical history:   Past Medical History:   Diagnosis Date    CAD (coronary artery disease)     CHF (congestive heart failure) ([facility]     COPD (chronic obstructive pulmonary disease) ([facility]     GERD (gastroesophageal reflux disease)     Hyperlipidemia     Hypertension     Myocardial infarction ([facility] 15 yrs ago     Past Surgical History:   Procedure Laterality Date    CATARACT REMOVAL WITH IMPLANT Right 1 posterior lean, max cueing for upright Sit to stand: Minimal assist of 2 cues for hand placement and sequencing     Sit to stand: Minimal assist of 1     Ambulation    1 heel toe steps using  wheeled walker with Moderate assist of  2   cues for sequencing, upright posture and safety assist to advance Left lower extremity  1 heel toe steps using  wheeled walker with Minimal assist of 2   cues for sequencing, upright posture, walker approximation, safety and heel/toe shuffle on RLE         10 feet using  wheeled walker with Moderate assist of 1    Stair negotiation: ascended and descended   Not assessed            ROM limited Left lower extremity due to pain and weakness    Increase range of motion 10% of affected joints    Strength BUE:  3+/5  RLE:  3+/5  LLE:  2+/5   Increase strength in affected mm groups by 1/3 grade   Balance Sitting EOB:  fair    Dynamic Standing:  poor posterior lean wheeled walker  Sitting EOB: good   Dynamic Standing: fair with wheeled walker    Sitting EOB:  good    Dynamic Standing: fair +wheeled walker      Patient is Alert & Oriented x person, place, time and situation  However seems confused at times and follows one step directions    Sensation:  Patient  denies numbness/tingling     Edema:  yes left lower extremity    Endurance: fair       Vitals: room air    Blood Pressure at rest   Blood Pressure during session     Heart Rate at rest  Heart Rate during session     SPO2 at rest %  SPO2 during session  %     Patient education  Patient educated on role of Physical Therapy, risks of immobility, safety and plan of care,  importance of mobility while in hospital , hip precautions, safety  and proper use/technique of incentive spirometer      Patient response to education:   Pt verbalized understanding Pt demonstrated skill Pt requires further education in this area   Yes Partial Yes      Treatment:  Patient practiced and was instructed/facilitated in the following treatment: SCDs and heel floaters doffed. Patient educated on hip precautions. Patient assisted with supine exercises, PROM/AAROM. Patient assisted to edge of bed. Patient sat edge of bed x15 min without holding on. Patient stood to wheeled walker, performed heel/toe shuffle on RLE towards [facility], and returned to sitting edge of bed after 1min. Patient assisted back to bed ModAx2  SCDs and prevalon boots donned. Therapeutic Exercises:  ankle pumps, quad sets, glut sets, heel slide, hip abduction/adduction and straight leg raise,  x 15 reps. At end of session, patient in bed with alarm call light and phone within reach,   all lines and tubes intact, nursing notified. Patient would benefit from continued skilled Physical Therapy to improve functional independence and quality of life. Patient's/ family goals   rehab        Time in  11:00  Time out  11:25    Total Treatment Time  25 minutes     CPT codes:  Therapeutic activities (86625)   15 minutes  1 unit(s)  Therapeutic exercises (62584)   10 minutes  1 unit(s)   Ernie Gallegos  [facility]  LIC # [id number]

## 2021-07-19 NOTE — FLOWSHEET NOTE
Inpatient Wound Care    Admit Date: 7/15/2021  4:26 PM    Reason for consult: left heel    Significant history:    Past Medical History:   Diagnosis Date    CAD (coronary artery disease)     [facility]congestive [facility] failure) [facility]     COPD (chronic obstructive pulmonary disease) ([facility]     GERD (gastroesophageal reflux disease)     Hyperlipidemia     Hypertension     Myocardial infarction ([facility] 15 yrs ago       Wound history:      Findings:       07/19/21 1016   Wound 07/15/21 Heel Left purple/red   Date First Assessed/Time First Assessed: 07/15/21 2345   Present on Hospital Admission: Yes  Primary Wound Type: Pressure Injury  Location: Heel  Wound Location Orientation: Left  Wound Description (Comments): purple/red   Wound Etiology Deep tissue/Injury   Dressing/Treatment Foam   Wound Length (cm) 2 cm   Wound Width (cm) 2 cm   Wound Surface Area (cm^2) 4 cm^2   Change in Wound Size % (l*w) 67.53   Drainage Amount None   Odor None       Impression:  Suspect deep tissue injury of left heel    Interventions in place:  Heel mepilex    Plan:  Cont heel mepilex  Cont to monitor heel  Off load heels at all times      Liv Jj RN 7/19/2021 10:19 AM

## 2021-07-19 NOTE — DISCHARGE SUMMARY
Problems:    * No resolved hospital problems. *      Consults:  IP CONSULT TO DIETITIAN  IP CONSULT TO PHARMACY    Procedures: Echocardiogram (TTE)    Hospital Course: Patient was admitted with MAVIS (acute kidney injury) ([facility] [N17.9]  MAVIS (acute kidney injury) ([facility] [N17.9]  Acute respiratory failure with hypoxia ([facility] [J96.01]. Patient is a 80 y.o male who presented to the ED with complaints of abnormal labs. Patient was admitted for acute kidney injury and acute respiratory failure with hypoxia in setting of low hemoglobin. Patient recent hospital stay consisted of 2 units of PRBCs transfusion and treatment for pneumonia with IV antibiotics. Patient received supplemental oxygen, breathing treatments and dose of IV Lasix. Patient after a few days his hypoxia improved. Patient had an echocardiogram completed that showed an Ejection fraction is visually estimated at 35-40%.  Septal motion consistent with conduction abnormality (left bundle branch  block). Normal right ventricular size and function. Indeterminate diastolic function. Mild-moderate mitral regurgitation. Mild aortic stenosis. When this echocardiogram was compared to [facility] there was a discrepancy with the EF. Kirsten's had an EF 55-75%. Recommend patient follow up with his cardiologist for further cardiology work up. Patient acute kidney injury has improved. Patient is breathing easy on room air with no acute respiratory distress noted.  Patient will return to [facility] with the following medications and instructions.         Discharge Exam:  Vitals:    07/18/21 1617 07/18/21 2030 07/19/21 0848 07/19/21 1130   BP: 131/60 135/62 (!) 141/65 (!) 145/65   Pulse: 67 68 71 74   Resp:  18 20 20   Temp:  98 °F (36.7 °C) 98 °F (36.7 °C) 98.2 °F (36.8 °C)   TempSrc:  Oral Oral Oral   SpO2:  95% 94% 95%   Weight:       Height:            General Appearance: alert and oriented to person, place and time, well-developed and well nourished and in no acute distress  Skin: warm and dry, left hip incision intact with staples, bilateral heel mepilex dressings intact   Head: normocephalic and atraumatic  Eyes: pupils equal, round, and reactive to light and conjunctivae normal  ENT: hearing grossly normal bilaterally  Neck: neck supple and non tender without mass   Pulmonary/Chest: diminished with few scattered rales noted to auscultation bilaterally, normal air movement, no respiratory distress  Cardiovascular: normal rate, regular rhythm and intact distal pulses  Abdomen: soft, non-tender, non-distended, normal bowel sounds, no masses or organomegaly  Extremities: no cyanosis, no clubbing and trace/1+ BLE edema  Musculoskeletal: ROM-  limited flexion, no swollen joints, no joint deformity and no tender joints  Neurologic: no cranial nerve deficit, speech normal and gait abnormal- unsteady    I/O last 3 completed shifts: In: 945.6 [P.O.:600; I.V.:10; IV Piggyback:335.6]  Out: 1625 [Urine:1625]  I/O this shift:  In: 120 [P.O.:120]  Out: 450 [Urine:450]      LABS:  Recent Labs     07/17/21  0633 07/18/21  0728 07/19/21  0601    137 135   K 4.0 4.0 3.9    105 103   CO2 20* 22 23   BUN 56* 50* 46*   CREATININE 1.8* 1.6* 1.6*   GLUCOSE 106* 121* 106*   CALCIUM 9.0 9.3 9.2       Recent Labs     07/17/21  0633 07/18/21  0728 07/19/21  0601   WBC 10.6 11.8* 11.5   RBC 2.67* 2.88* 2.87*   HGB 8.6* 9.1* 9.1*   HCT 26.5* 28.7* 28.0*   MCV 99.3 99.7 97.6   MCH 32.2 31.6 31.7   MCHC 32.5 31.7* 32.5   RDW 21.2* 20.4* 19.6*    353 361   MPV 11.3 10.9 11.0       No results for input(s): POCGLU in the last 72 hours. Imaging:   XR CHEST (2 VW)   Final Result   1. Vague opacity within the right lung base which I believe represents   superimposed vascular structures over the inferior border of the right   scapula. PA and lateral views are recommended to exclude underlying   pneumonia. 2. The right upper lobe and left lung are clear. XR CHEST 1 VIEW   Final Result   No radiographic evidence of definite acute cardiopulmonary disease in the   visualized chest         CT Head WO Contrast   Final Result   No acute intracranial abnormality. Periventricular white matter changes consistent chronic microvascular   disease. Diffuse volume loss.              Patient Instructions:      Medication List      START taking these medications    cefdinir 300 MG capsule  Commonly known as: OMNICEF  Take 1 capsule by mouth 2 times daily for 5 days     doxycycline hyclate 100 MG tablet  Commonly known as: VIBRA-TABS  Take 1 tablet by mouth 2 times daily for 5 days        CONTINUE taking these medications    aspirin 81 MG EC tablet     b complex vitamins capsule     bisacodyl 10 MG suppository  Commonly known as: DULCOLAX     carvedilol 6.25 MG tablet  Commonly known as: COREG     clopidogrel 75 MG tablet  Commonly known as: PLAVIX     Co Q-10 100 MG Caps     docusate sodium 100 MG capsule  Commonly known as: COLACE     donepezil 10 MG disintegrating tablet  Commonly known as: ARICEPT ODT     Eliquis 2.5 MG Tabs tablet  Generic drug: apixaban     Entresto 24-26 MG per tablet  Generic drug: sacubitril-valsartan     famotidine 20 MG tablet  Commonly known as: PEPCID     ferrous sulfate 325 (65 Fe) MG tablet  Commonly known as: IRON 325     magnesium hydroxide 400 MG/5ML suspension  Commonly known as: MILK OF MAGNESIA     rosuvastatin 10 MG tablet  Commonly known as: CRESTOR     torsemide 10 MG tablet  Commonly known as: DEMADEX     traMADol 50 MG tablet  Commonly known as: ULTRAM     venlafaxine 37.5 MG tablet  Commonly known as: EFFEXOR        STOP taking these medications    Altace 10 MG tablet  Generic drug: ramipril     HYDROcodone-acetaminophen 5-325 MG per tablet  Commonly known as: NORCO     omeprazole 10 MG delayed release capsule  Commonly known as: PRILOSEC     pravastatin 80 MG tablet  Commonly known as: PRAVACHOL     VESIcare 10 MG tablet  Generic drug: solifenacin     Vitamin D3 50 MCG (2000 UT) Caps     zolpidem 5 MG tablet  Commonly known as: AMBIEN           Where to Get Your Medications      These medications were sent to [facility], [address] [phone]  [address] [place] [address]., [place] [address]    Phone: [phone]   · cefdinir 300 MG capsule  · doxycycline hyclate 100 MG tablet           Note that more than 30 minutes was spent in preparing discharge papers, discussing discharge with patient, medication review, etc.    Signed:  Electronically signed by LAZARO Hatfield CNP on 7/19/2021 at 1:48 PM    NOTE: This report was transcribed using voice recognition software. Every effort was made to ensure accuracy; however, inadvertent computerized transcription errors may be present.

## 2021-07-19 NOTE — CARE COORDINATION
Called the [facility] and left a detailed message with intake information.  Electronically signed by Leni Gan on 7/19/2021 at 12:38 PM

## 2021-07-19 NOTE — PROGRESS NOTES
Normal saline bubble study completed for echo. DEVIN Earl R.N. full range of motion in all extremities

## 2021-08-09 ENCOUNTER — HOSPITAL ENCOUNTER (OUTPATIENT)
Age: 86
Setting detail: SPECIMEN
Discharge: HOME | End: 2021-08-09
Payer: MEDICARE

## 2021-08-10 ENCOUNTER — HOSPITAL ENCOUNTER (OUTPATIENT)
Dept: PREOP | Age: 86
Discharge: HOME | End: 2021-08-10
Payer: MEDICARE

## 2021-08-10 VITALS
SYSTOLIC BLOOD PRESSURE: 142 MMHG | TEMPERATURE: 98 F | DIASTOLIC BLOOD PRESSURE: 66 MMHG | OXYGEN SATURATION: 95 % | HEIGHT: 76 IN | WEIGHT: 180 LBS | HEART RATE: 75 BPM | RESPIRATION RATE: 16 BRPM | BODY MASS INDEX: 21.92 KG/M2

## 2021-08-10 LAB
ABO/RH: NORMAL
ANTIBODY SCREEN: NORMAL
BLOOD BANK DISPENSE STATUS: NORMAL
BLOOD BANK PRODUCT CODE: NORMAL
BPU ID: NORMAL
DESCRIPTION BLOOD BANK: NORMAL

## 2021-08-10 PROCEDURE — 36415 COLL VENOUS BLD VENIPUNCTURE: CPT

## 2021-08-10 PROCEDURE — 6360000002 HC RX W HCPCS: Performed by: INTERNAL MEDICINE

## 2021-08-10 PROCEDURE — P9016 RBC LEUKOCYTES REDUCED: HCPCS

## 2021-08-10 PROCEDURE — 86900 BLOOD TYPING SEROLOGIC ABO: CPT

## 2021-08-10 PROCEDURE — 36430 TRANSFUSION BLD/BLD COMPNT: CPT

## 2021-08-10 PROCEDURE — 86901 BLOOD TYPING SEROLOGIC RH(D): CPT

## 2021-08-10 PROCEDURE — 86923 COMPATIBILITY TEST ELECTRIC: CPT

## 2021-08-10 PROCEDURE — 2580000003 HC RX 258: Performed by: INTERNAL MEDICINE

## 2021-08-10 PROCEDURE — 86850 RBC ANTIBODY SCREEN: CPT

## 2021-08-10 RX ORDER — SODIUM CHLORIDE 0.9 % (FLUSH) 0.9 %
5-40 SYRINGE (ML) INJECTION PRN
Status: DISCONTINUED | OUTPATIENT
Start: 2021-08-10 | End: 2021-08-11 | Stop reason: HOSPADM

## 2021-08-10 RX ORDER — SODIUM CHLORIDE 9 MG/ML
INJECTION, SOLUTION INTRAVENOUS PRN
Status: DISCONTINUED | OUTPATIENT
Start: 2021-08-10 | End: 2021-08-11 | Stop reason: HOSPADM

## 2021-08-10 RX ORDER — FUROSEMIDE 10 MG/ML
20 INJECTION INTRAMUSCULAR; INTRAVENOUS ONCE
Status: COMPLETED | OUTPATIENT
Start: 2021-08-10 | End: 2021-08-10

## 2021-08-10 RX ORDER — SODIUM CHLORIDE 0.9 % (FLUSH) 0.9 %
5-40 SYRINGE (ML) INJECTION EVERY 12 HOURS
Status: DISCONTINUED | OUTPATIENT
Start: 2021-08-10 | End: 2021-08-11 | Stop reason: HOSPADM

## 2021-08-10 RX ADMIN — SODIUM CHLORIDE, PRESERVATIVE FREE 10 ML: 5 INJECTION INTRAVENOUS at 08:40

## 2021-08-10 RX ADMIN — FUROSEMIDE 20 MG: 10 INJECTION, SOLUTION INTRAMUSCULAR; INTRAVENOUS at 13:15

## 2021-08-10 RX ADMIN — SODIUM CHLORIDE, PRESERVATIVE FREE 10 ML: 5 INJECTION INTRAVENOUS at 13:14

## 2021-08-10 ASSESSMENT — PAIN SCALES - GENERAL
PAINLEVEL_OUTOF10: 0

## 2021-08-10 ASSESSMENT — PAIN - FUNCTIONAL ASSESSMENT: PAIN_FUNCTIONAL_ASSESSMENT: 0-10

## 2021-08-10 NOTE — PROGRESS NOTES
Called Dr Michael Gary . Orders given. Report called to [address] at [facility]. Carol Jeffery RN  8/10/2021  1315  Discharged per w/c ,[facility] transportation, with his own  Chris.   Carol Jeffery RN  8/10/2021  2:02 PM

## 2021-08-10 NOTE — PROGRESS NOTES
Pt [id number] for blood transfusion. No blood band on. Pt not sure why he is here. Oriented to person and . Called Daughter Mike Ortega for consent. She was aware pt was to get blood today.    Americo Mcfarland RN  8/10/2021  8527

## 2021-08-29 ENCOUNTER — HOSPITAL ENCOUNTER (INPATIENT)
Age: 86
LOS: 15 days | Discharge: NURSING, REHAB OR LONG-TERM CARE | DRG: 871 | End: 2021-09-13
Attending: EMERGENCY MEDICINE | Admitting: INTERNAL MEDICINE
Payer: MEDICARE

## 2021-08-29 DIAGNOSIS — K92.2 UPPER GI BLEED: Primary | ICD-10-CM

## 2021-08-29 LAB
ABO/RH: NORMAL
ALBUMIN SERPL-MCNC: 2.7 G/DL (ref 3.5–5.2)
ALP BLD-CCNC: 85 U/L (ref 40–129)
ALT SERPL-CCNC: 8 U/L (ref 0–40)
ANION GAP SERPL CALCULATED.3IONS-SCNC: 8 MMOL/L (ref 7–16)
ANTIBODY SCREEN: NORMAL
AST SERPL-CCNC: 10 U/L (ref 0–39)
BASOPHILS ABSOLUTE: 0.02 E9/L (ref 0–0.2)
BASOPHILS RELATIVE PERCENT: 0.2 % (ref 0–2)
BILIRUB SERPL-MCNC: 0.2 MG/DL (ref 0–1.2)
BLOOD BANK DISPENSE STATUS: NORMAL
BLOOD BANK PRODUCT CODE: NORMAL
BPU ID: NORMAL
BUN BLDV-MCNC: 38 MG/DL (ref 6–23)
CALCIUM SERPL-MCNC: 9.3 MG/DL (ref 8.6–10.2)
CHLORIDE BLD-SCNC: 103 MMOL/L (ref 98–107)
CO2: 26 MMOL/L (ref 22–29)
CREAT SERPL-MCNC: 2 MG/DL (ref 0.7–1.2)
DESCRIPTION BLOOD BANK: NORMAL
EKG ATRIAL RATE: 73 BPM
EKG P AXIS: 92 DEGREES
EKG P-R INTERVAL: 248 MS
EKG Q-T INTERVAL: 452 MS
EKG QRS DURATION: 174 MS
EKG QTC CALCULATION (BAZETT): 497 MS
EKG R AXIS: -61 DEGREES
EKG T AXIS: 110 DEGREES
EKG VENTRICULAR RATE: 73 BPM
EOSINOPHILS ABSOLUTE: 0.2 E9/L (ref 0.05–0.5)
EOSINOPHILS RELATIVE PERCENT: 2 % (ref 0–6)
GFR AFRICAN AMERICAN: 38
GFR NON-AFRICAN AMERICAN: 32 ML/MIN/1.73
GLUCOSE BLD-MCNC: 115 MG/DL (ref 74–99)
HCT VFR BLD CALC: 21.7 % (ref 37–54)
HCT VFR BLD CALC: 23 % (ref 37–54)
HCT VFR BLD CALC: 24.1 % (ref 37–54)
HEMOGLOBIN: 6.7 G/DL (ref 12.5–16.5)
HEMOGLOBIN: 7.5 G/DL (ref 12.5–16.5)
HEMOGLOBIN: 7.8 G/DL (ref 12.5–16.5)
IMMATURE GRANULOCYTES #: 0.08 E9/L
IMMATURE GRANULOCYTES %: 0.8 % (ref 0–5)
LYMPHOCYTES ABSOLUTE: 1.23 E9/L (ref 1.5–4)
LYMPHOCYTES RELATIVE PERCENT: 12.1 % (ref 20–42)
MCH RBC QN AUTO: 31.8 PG (ref 26–35)
MCHC RBC AUTO-ENTMCNC: 30.9 % (ref 32–34.5)
MCV RBC AUTO: 102.8 FL (ref 80–99.9)
MONOCYTES ABSOLUTE: 0.77 E9/L (ref 0.1–0.95)
MONOCYTES RELATIVE PERCENT: 7.6 % (ref 2–12)
NEUTROPHILS ABSOLUTE: 7.89 E9/L (ref 1.8–7.3)
NEUTROPHILS RELATIVE PERCENT: 77.3 % (ref 43–80)
PDW BLD-RTO: 17.8 FL (ref 11.5–15)
PLATELET # BLD: 259 E9/L (ref 130–450)
PMV BLD AUTO: 10.7 FL (ref 7–12)
POTASSIUM SERPL-SCNC: 4.2 MMOL/L (ref 3.5–5)
RBC # BLD: 2.11 E12/L (ref 3.8–5.8)
SODIUM BLD-SCNC: 137 MMOL/L (ref 132–146)
TOTAL PROTEIN: 6.2 G/DL (ref 6.4–8.3)
WBC # BLD: 10.2 E9/L (ref 4.5–11.5)

## 2021-08-29 PROCEDURE — 86923 COMPATIBILITY TEST ELECTRIC: CPT

## 2021-08-29 PROCEDURE — 99222 1ST HOSP IP/OBS MODERATE 55: CPT | Performed by: INTERNAL MEDICINE

## 2021-08-29 PROCEDURE — 36415 COLL VENOUS BLD VENIPUNCTURE: CPT

## 2021-08-29 PROCEDURE — 86900 BLOOD TYPING SEROLOGIC ABO: CPT

## 2021-08-29 PROCEDURE — 2060000000 HC ICU INTERMEDIATE R&B

## 2021-08-29 PROCEDURE — 80053 COMPREHEN METABOLIC PANEL: CPT

## 2021-08-29 PROCEDURE — 6360000002 HC RX W HCPCS: Performed by: INTERNAL MEDICINE

## 2021-08-29 PROCEDURE — 85025 COMPLETE CBC W/AUTO DIFF WBC: CPT

## 2021-08-29 PROCEDURE — 99284 EMERGENCY DEPT VISIT MOD MDM: CPT

## 2021-08-29 PROCEDURE — 6370000000 HC RX 637 (ALT 250 FOR IP): Performed by: INTERNAL MEDICINE

## 2021-08-29 PROCEDURE — 85018 HEMOGLOBIN: CPT

## 2021-08-29 PROCEDURE — C9113 INJ PANTOPRAZOLE SODIUM, VIA: HCPCS | Performed by: INTERNAL MEDICINE

## 2021-08-29 PROCEDURE — 93010 ELECTROCARDIOGRAM REPORT: CPT | Performed by: INTERNAL MEDICINE

## 2021-08-29 PROCEDURE — APPSS45 APP SPLIT SHARED TIME 31-45 MINUTES: Performed by: NURSE PRACTITIONER

## 2021-08-29 PROCEDURE — 2580000003 HC RX 258: Performed by: INTERNAL MEDICINE

## 2021-08-29 PROCEDURE — C9113 INJ PANTOPRAZOLE SODIUM, VIA: HCPCS | Performed by: EMERGENCY MEDICINE

## 2021-08-29 PROCEDURE — 86901 BLOOD TYPING SEROLOGIC RH(D): CPT

## 2021-08-29 PROCEDURE — 93005 ELECTROCARDIOGRAM TRACING: CPT | Performed by: NURSE PRACTITIONER

## 2021-08-29 PROCEDURE — 6360000002 HC RX W HCPCS: Performed by: EMERGENCY MEDICINE

## 2021-08-29 PROCEDURE — P9016 RBC LEUKOCYTES REDUCED: HCPCS

## 2021-08-29 PROCEDURE — 85014 HEMATOCRIT: CPT

## 2021-08-29 PROCEDURE — 86850 RBC ANTIBODY SCREEN: CPT

## 2021-08-29 RX ORDER — CARVEDILOL 6.25 MG/1
6.25 TABLET ORAL 2 TIMES DAILY WITH MEALS
Status: DISCONTINUED | OUTPATIENT
Start: 2021-08-29 | End: 2021-09-08

## 2021-08-29 RX ORDER — ONDANSETRON 2 MG/ML
4 INJECTION INTRAMUSCULAR; INTRAVENOUS EVERY 6 HOURS PRN
Status: DISCONTINUED | OUTPATIENT
Start: 2021-08-29 | End: 2021-09-13 | Stop reason: HOSPADM

## 2021-08-29 RX ORDER — DRONABINOL 2.5 MG/1
2.5 CAPSULE ORAL
Status: ON HOLD | COMMUNITY
End: 2021-09-13 | Stop reason: HOSPADM

## 2021-08-29 RX ORDER — VITAMIN C
1 TAB ORAL DAILY
Status: DISCONTINUED | OUTPATIENT
Start: 2021-08-29 | End: 2021-09-08

## 2021-08-29 RX ORDER — PANTOPRAZOLE SODIUM 40 MG/10ML
40 INJECTION, POWDER, LYOPHILIZED, FOR SOLUTION INTRAVENOUS 2 TIMES DAILY
Status: DISCONTINUED | OUTPATIENT
Start: 2021-08-29 | End: 2021-08-31

## 2021-08-29 RX ORDER — TORSEMIDE 10 MG/1
5 TABLET ORAL EVERY OTHER DAY
Status: DISCONTINUED | OUTPATIENT
Start: 2021-08-29 | End: 2021-09-13 | Stop reason: HOSPADM

## 2021-08-29 RX ORDER — DIMENHYDRINATE 50 MG
1 TABLET ORAL DAILY
Status: DISCONTINUED | OUTPATIENT
Start: 2021-08-29 | End: 2021-08-29

## 2021-08-29 RX ORDER — CEPHALEXIN 500 MG/1
500 CAPSULE ORAL 2 TIMES DAILY
Status: ON HOLD | COMMUNITY
End: 2021-08-29 | Stop reason: ALTCHOICE

## 2021-08-29 RX ORDER — ACETAMINOPHEN 325 MG/1
650 TABLET ORAL EVERY 6 HOURS PRN
Status: DISCONTINUED | OUTPATIENT
Start: 2021-08-29 | End: 2021-09-13 | Stop reason: HOSPADM

## 2021-08-29 RX ORDER — SUCRALFATE 1 G/1
1 TABLET ORAL
Status: ON HOLD | COMMUNITY
End: 2021-09-13 | Stop reason: HOSPADM

## 2021-08-29 RX ORDER — DONEPEZIL HYDROCHLORIDE 5 MG/1
5 TABLET, ORALLY DISINTEGRATING ORAL NIGHTLY
Status: DISCONTINUED | OUTPATIENT
Start: 2021-08-29 | End: 2021-09-13 | Stop reason: HOSPADM

## 2021-08-29 RX ORDER — SODIUM CHLORIDE 0.9 % (FLUSH) 0.9 %
5-40 SYRINGE (ML) INJECTION PRN
Status: DISCONTINUED | OUTPATIENT
Start: 2021-08-29 | End: 2021-09-13 | Stop reason: HOSPADM

## 2021-08-29 RX ORDER — PANTOPRAZOLE SODIUM 40 MG/10ML
40 INJECTION, POWDER, LYOPHILIZED, FOR SOLUTION INTRAVENOUS ONCE
Status: COMPLETED | OUTPATIENT
Start: 2021-08-29 | End: 2021-08-29

## 2021-08-29 RX ORDER — ONDANSETRON 4 MG/1
4 TABLET, ORALLY DISINTEGRATING ORAL EVERY 8 HOURS PRN
Status: DISCONTINUED | OUTPATIENT
Start: 2021-08-29 | End: 2021-09-13 | Stop reason: HOSPADM

## 2021-08-29 RX ORDER — DOCUSATE SODIUM 100 MG/1
100 CAPSULE, LIQUID FILLED ORAL 2 TIMES DAILY
Status: DISCONTINUED | OUTPATIENT
Start: 2021-08-29 | End: 2021-09-13 | Stop reason: HOSPADM

## 2021-08-29 RX ORDER — VENLAFAXINE 37.5 MG/1
37.5 TABLET ORAL 2 TIMES DAILY
Status: DISCONTINUED | OUTPATIENT
Start: 2021-08-29 | End: 2021-09-13 | Stop reason: HOSPADM

## 2021-08-29 RX ORDER — POLYETHYLENE GLYCOL 3350 17 G/17G
17 POWDER, FOR SOLUTION ORAL DAILY PRN
Status: DISCONTINUED | OUTPATIENT
Start: 2021-08-29 | End: 2021-09-13 | Stop reason: HOSPADM

## 2021-08-29 RX ORDER — SODIUM CHLORIDE 9 MG/ML
10 INJECTION INTRAVENOUS 2 TIMES DAILY
Status: DISCONTINUED | OUTPATIENT
Start: 2021-08-29 | End: 2021-08-31

## 2021-08-29 RX ORDER — FERROUS SULFATE 325(65) MG
325 TABLET ORAL
Status: DISCONTINUED | OUTPATIENT
Start: 2021-08-30 | End: 2021-09-13 | Stop reason: HOSPADM

## 2021-08-29 RX ORDER — SODIUM CHLORIDE 0.9 % (FLUSH) 0.9 %
5-40 SYRINGE (ML) INJECTION EVERY 12 HOURS SCHEDULED
Status: DISCONTINUED | OUTPATIENT
Start: 2021-08-29 | End: 2021-09-13 | Stop reason: HOSPADM

## 2021-08-29 RX ORDER — BISACODYL 10 MG
10 SUPPOSITORY, RECTAL RECTAL DAILY
Status: DISCONTINUED | OUTPATIENT
Start: 2021-08-29 | End: 2021-09-13 | Stop reason: HOSPADM

## 2021-08-29 RX ORDER — SODIUM CHLORIDE 9 MG/ML
INJECTION, SOLUTION INTRAVENOUS CONTINUOUS
Status: DISCONTINUED | OUTPATIENT
Start: 2021-08-29 | End: 2021-08-30

## 2021-08-29 RX ORDER — SODIUM CHLORIDE, SODIUM LACTATE, POTASSIUM CHLORIDE, CALCIUM CHLORIDE 600; 310; 30; 20 MG/100ML; MG/100ML; MG/100ML; MG/100ML
INJECTION, SOLUTION INTRAVENOUS CONTINUOUS
Status: DISCONTINUED | OUTPATIENT
Start: 2021-08-29 | End: 2021-08-31

## 2021-08-29 RX ORDER — ACETAMINOPHEN 650 MG/1
650 SUPPOSITORY RECTAL EVERY 6 HOURS PRN
Status: DISCONTINUED | OUTPATIENT
Start: 2021-08-29 | End: 2021-09-13 | Stop reason: HOSPADM

## 2021-08-29 RX ORDER — CLOPIDOGREL BISULFATE 75 MG/1
75 TABLET ORAL DAILY
Status: DISCONTINUED | OUTPATIENT
Start: 2021-08-29 | End: 2021-09-13 | Stop reason: HOSPADM

## 2021-08-29 RX ORDER — SODIUM CHLORIDE 9 MG/ML
25 INJECTION, SOLUTION INTRAVENOUS PRN
Status: DISCONTINUED | OUTPATIENT
Start: 2021-08-29 | End: 2021-09-13 | Stop reason: HOSPADM

## 2021-08-29 RX ORDER — SODIUM CHLORIDE 9 MG/ML
INJECTION, SOLUTION INTRAVENOUS PRN
Status: DISCONTINUED | OUTPATIENT
Start: 2021-08-29 | End: 2021-09-13 | Stop reason: HOSPADM

## 2021-08-29 RX ORDER — ROSUVASTATIN CALCIUM 10 MG/1
10 TABLET, COATED ORAL DAILY
Status: DISCONTINUED | OUTPATIENT
Start: 2021-08-29 | End: 2021-09-13 | Stop reason: HOSPADM

## 2021-08-29 RX ADMIN — PANTOPRAZOLE SODIUM 40 MG: 40 INJECTION, POWDER, FOR SOLUTION INTRAVENOUS at 15:30

## 2021-08-29 RX ADMIN — SODIUM CHLORIDE, PRESERVATIVE FREE 10 ML: 5 INJECTION INTRAVENOUS at 23:02

## 2021-08-29 RX ADMIN — DOCUSATE SODIUM 100 MG: 100 CAPSULE, LIQUID FILLED ORAL at 22:33

## 2021-08-29 RX ADMIN — VENLAFAXINE 37.5 MG: 37.5 TABLET ORAL at 23:02

## 2021-08-29 RX ADMIN — PANTOPRAZOLE SODIUM 40 MG: 40 INJECTION, POWDER, FOR SOLUTION INTRAVENOUS at 22:33

## 2021-08-29 RX ADMIN — DONEPEZIL HYDROCHLORIDE 5 MG: 5 TABLET, ORALLY DISINTEGRATING ORAL at 23:02

## 2021-08-29 RX ADMIN — PANTOPRAZOLE SODIUM 40 MG: 40 INJECTION, POWDER, FOR SOLUTION INTRAVENOUS at 14:18

## 2021-08-29 RX ADMIN — SODIUM CHLORIDE, PRESERVATIVE FREE 10 ML: 5 INJECTION INTRAVENOUS at 15:30

## 2021-08-29 RX ADMIN — SODIUM CHLORIDE, POTASSIUM CHLORIDE, SODIUM LACTATE AND CALCIUM CHLORIDE: 600; 310; 30; 20 INJECTION, SOLUTION INTRAVENOUS at 14:18

## 2021-08-29 RX ADMIN — Medication 10 ML: at 22:34

## 2021-08-29 SDOH — ECONOMIC STABILITY: FOOD INSECURITY: WITHIN THE PAST 12 MONTHS, YOU WORRIED THAT YOUR FOOD WOULD RUN OUT BEFORE YOU GOT MONEY TO BUY MORE.: NEVER TRUE

## 2021-08-29 SDOH — ECONOMIC STABILITY: FOOD INSECURITY: WITHIN THE PAST 12 MONTHS, THE FOOD YOU BOUGHT JUST DIDN'T LAST AND YOU DIDN'T HAVE MONEY TO GET MORE.: NEVER TRUE

## 2021-08-29 SDOH — HEALTH STABILITY: PHYSICAL HEALTH: ON AVERAGE, HOW MANY DAYS PER WEEK DO YOU ENGAGE IN MODERATE TO STRENUOUS EXERCISE (LIKE A BRISK WALK)?: 0 DAYS

## 2021-08-29 SDOH — ECONOMIC STABILITY: HOUSING INSECURITY
IN THE LAST 12 MONTHS, WAS THERE A TIME WHEN YOU DID NOT HAVE A STEADY PLACE TO SLEEP OR SLEPT IN A SHELTER (INCLUDING NOW)?: NO

## 2021-08-29 SDOH — ECONOMIC STABILITY: HOUSING INSECURITY: IN THE LAST 12 MONTHS, HOW MANY PLACES HAVE YOU LIVED?: 1

## 2021-08-29 SDOH — ECONOMIC STABILITY: TRANSPORTATION INSECURITY
IN THE PAST 12 MONTHS, HAS LACK OF TRANSPORTATION KEPT YOU FROM MEETINGS, WORK, OR FROM GETTING THINGS NEEDED FOR DAILY LIVING?: YES

## 2021-08-29 SDOH — ECONOMIC STABILITY: INCOME INSECURITY: IN THE LAST 12 MONTHS, WAS THERE A TIME WHEN YOU WERE NOT ABLE TO PAY THE MORTGAGE OR RENT ON TIME?: NO

## 2021-08-29 SDOH — HEALTH STABILITY: PHYSICAL HEALTH: ON AVERAGE, HOW MANY MINUTES DO YOU ENGAGE IN EXERCISE AT THIS LEVEL?: 0 MIN

## 2021-08-29 SDOH — ECONOMIC STABILITY: TRANSPORTATION INSECURITY
IN THE PAST 12 MONTHS, HAS THE LACK OF TRANSPORTATION KEPT YOU FROM MEDICAL APPOINTMENTS OR FROM GETTING MEDICATIONS?: YES

## 2021-08-29 ASSESSMENT — SOCIAL DETERMINANTS OF HEALTH (SDOH)
HOW HARD IS IT FOR YOU TO PAY FOR THE VERY BASICS LIKE FOOD, HOUSING, MEDICAL CARE, AND HEATING?: NOT HARD AT ALL
HOW OFTEN DO YOU ATTENT MEETINGS OF THE CLUB OR ORGANIZATION YOU BELONG TO?: NEVER
HOW OFTEN DO YOU GET TOGETHER WITH FRIENDS OR RELATIVES?: MORE THAN THREE TIMES A WEEK
IN A TYPICAL WEEK, HOW MANY TIMES DO YOU TALK ON THE PHONE WITH FAMILY, FRIENDS, OR NEIGHBORS?: THREE TIMES A WEEK
WITHIN THE LAST YEAR, HAVE YOU BEEN AFRAID OF YOUR PARTNER OR EX-PARTNER?: NO
WITHIN THE LAST YEAR, HAVE YOU BEEN HUMILIATED OR EMOTIONALLY ABUSED IN OTHER WAYS BY YOUR PARTNER OR EX-PARTNER?: NO
WITHIN THE LAST YEAR, HAVE TO BEEN RAPED OR FORCED TO HAVE ANY KIND OF SEXUAL ACTIVITY BY YOUR PARTNER OR EX-PARTNER?: NO
HOW OFTEN DO YOU ATTEND CHURCH OR RELIGIOUS SERVICES?: MORE THAN 4 TIMES PER YEAR
WITHIN THE LAST YEAR, HAVE YOU BEEN KICKED, HIT, SLAPPED, OR OTHERWISE PHYSICALLY HURT BY YOUR PARTNER OR EX-PARTNER?: NO
DO YOU BELONG TO ANY CLUBS OR ORGANIZATIONS SUCH AS CHURCH GROUPS UNIONS, FRATERNAL OR ATHLETIC GROUPS, OR SCHOOL GROUPS?: NO

## 2021-08-29 ASSESSMENT — PATIENT HEALTH QUESTIONNAIRE - PHQ9
1. LITTLE INTEREST OR PLEASURE IN DOING THINGS: SEVERAL DAYS
2. FEELING DOWN, DEPRESSED OR HOPELESS: SEVERAL DAYS
2. FEELING DOWN, DEPRESSED OR HOPELESS: SEVERAL DAYS
DEPRESSION UNABLE TO ASSESS: YES
1. LITTLE INTEREST OR PLEASURE IN DOING THINGS: SEVERAL DAYS
DEPRESSION UNABLE TO ASSESS: YES
SUM OF ALL RESPONSES TO PHQ9 QUESTIONS 1 & 2: 2
SUM OF ALL RESPONSES TO PHQ9 QUESTIONS 1 & 2: 2

## 2021-08-29 ASSESSMENT — PAIN DESCRIPTION - PAIN TYPE: TYPE: REFERRED PAIN

## 2021-08-29 ASSESSMENT — PAIN SCALES - GENERAL
PAINLEVEL_OUTOF10: 0
PAINLEVEL_OUTOF10: 0

## 2021-08-29 ASSESSMENT — LIFESTYLE VARIABLES
HOW OFTEN DO YOU HAVE A DRINK CONTAINING ALCOHOL: MONTHLY OR LESS
HOW MANY STANDARD DRINKS CONTAINING ALCOHOL DO YOU HAVE ON A TYPICAL DAY: 1 OR 2

## 2021-08-29 NOTE — ED NOTES
Bed: 09  Expected date: 8/29/21  Expected time: 9:09 AM  Means of arrival: [facility]  [employer]  Comments:     Abiodun Ye RN  08/29/21 6405

## 2021-08-29 NOTE — LETTER
[place] [employer] Medicaid  CERTIFICATION OF NECESSITY  FOR NON-EMERGENCY TRANSPORTATION   BY GROUND AMBULANCE      Individual Information   1. Name: Pepito Mccarthy 2. [place] Medicaid Billing Number:    3. Address: [address]      Transportation Provider Information   4. Provider Name:    5. [place] Provider Number:  National Provider Identifier (NPI):      Certification  7. Criteria:  During transport, this individual requires:  [x] Medical treatment or continuous     supervision by an EMT. [] The administration or regulation of oxygen by another person. [] Supervised protective restraint. 8. Period Beginning Date:  9/13/21   9. Length  [x] Not more than 1 day(s)  [] One Year     Additional Information Relevant to Certification   10. Comments or Explanations, If Necessary or Appropriate    Covid 19. Alert and oriented to person only. Acute blood loss anemia    Moderate protein-calorie malnutrition   Decubitus ulcer of left heel. Pressure injury of sacral region, stage 2      Acute cystitis with hematuria    Sepsis due to urinary tract infection        Certifying Practitioner Information   11. Name of Practitioner:   Kavita Mera MD    12. [place] Medicaid Provider Number, If Applicable:  [email or web address] Provider Identifier (NPI):      Signature Information   14. Date of Signature: 9/13/21   15. Name of Person Signing: LD Lala     16. Signature and Professional Designation: Electronically signed by LD Lala on 9/13/2021 at 5:24 PM       OD [phone]  Rev. 7/2015       [facility] Encounter Date/Time: 8/29/2021 [address] Account: [de-identified]    MRN: [id number]    Patient: Pepito Mccarthy    Contact Serial #: [id number]      ENCOUNTER          Patient Class: I Private Enc? No Unit RM BD: [facility] 4 TELE [id number]   Hospital Service:  FMP   Encounter DX: Upper GI bleed [K92.2]   ADM Provider: Fatoumata Ogden DO   Procedure:     ATT Provider: Tim Shine

## 2021-08-29 NOTE — H&P
[address] [place], [place] [address]ist Group   History and Physical      CHIEF COMPLAINT:  Anemia    History of Present Illness:  80 y.o. male with a history of  presents with CKD, MI,COPD, CHF, CAD, hypertension, hyperlipidemia, that presented to the Emergency Department with anemia. His lab work at the nursing facility was 6.3. Upon arrival lab work was drawn and his Hgb was 6.7. He received one unit of PRBCs and his Hgb improved to 7.5. He was recently admitted from 7/15/21-7/19/21. He had a recent hip repair at another facility. He was transfused with two units of PRBCs at that time. He was found to be iron deficient and was started on oral iron. He was also treated for pneumonia. His EF was found to be lower than his previous EF (outside Echo) at 35-40%. His anemia improved and he was discharged back to the rehab facility. He had melena in the Emergency Department. His Eliquis and Plavix were placed on hold. Spoke with the patient's daughter and the patient was transfused with one unit of blood on 8/10/21 at the [facility]. The patient also had an EGD a year and a half ago at [facility] and had a bleeding ulcer that cauterized. Informant(s) for H&P: Medical record. Patient is alert to person only    REVIEW OF SYSTEMS:  no fevers, chills, cp, sob, n/v, ha, vision/hearing changes, wt changes, hot/cold flashes, other open skin lesions, diarrhea, constipation, dysuria/hematuria unless noted in HPI. Complete ROS performed with the patient and is otherwise negative.       PMH:  Past Medical History:   Diagnosis Date    CAD (coronary artery disease)     [facility]     COPD (chronic obstructive pulmonary disease) (H[facility])     GERD (gastroesophageal reflux disease)     Hyperlipidemia     Hypertension     Myocardial infarction ([address] 15 yrs ago       Surgical History:  Past Surgical History:   Procedure Laterality Date    CATARACT REMOVAL WITH IMPLANT Right 3/3/14  CATARACT REMOVAL WITH IMPLANT Left 10/13/2014    COLONOSCOPY      CORONARY ARTERY BYPASS GRAFT  15 yrs ago    3 stents       Medications Prior to Admission:    Prior to Admission medications    Medication Sig Start Date End Date Taking? Authorizing Provider   b complex vitamins capsule Take 1 capsule by mouth daily    Historical Provider, MD   carvedilol (COREG) 6.25 MG tablet Take 6.25 mg by mouth 2 times daily (with meals)    Historical Provider, MD   clopidogrel (PLAVIX) 75 MG tablet Take 75 mg by mouth daily    Historical Provider, MD   Coenzyme Q10 (CO Q-10) 100 MG CAPS Take 1 capsule by mouth daily    Historical Provider, MD   docusate sodium (COLACE) 100 MG capsule Take 100 mg by mouth 2 times daily    Historical Provider, MD   apixaban (ELIQUIS) 2.5 MG TABS tablet Take 2.5 mg by mouth 2 times daily    Historical Provider, MD   sacubitril-valsartan (ENTRESTO) 24-26 MG per tablet Take 1 tablet by mouth 2 times daily    Historical Provider, MD   famotidine (PEPCID) 20 MG tablet Take 20 mg by mouth daily    Historical Provider, MD   ferrous sulfate (IRON 325) 325 (65 Fe) MG tablet Take 325 mg by mouth daily (with breakfast)    Historical Provider, MD   rosuvastatin (CRESTOR) 10 MG tablet Take 10 mg by mouth daily    Historical Provider, MD   torsemide (DEMADEX) 10 MG tablet Take 5 mg by mouth every other day    Historical Provider, MD   venlafaxine (EFFEXOR) 37.5 MG tablet Take 37.5 mg by mouth 2 times daily    Historical Provider, MD   bisacodyl (DULCOLAX) 10 MG suppository Place 10 mg rectally daily    Historical Provider, MD   magnesium hydroxide (MILK OF MAGNESIA) 400 MG/5ML suspension Take by mouth daily as needed for Constipation    Historical Provider, MD   traMADol (ULTRAM) 50 MG tablet Take 50 mg by mouth every 4 hours as needed for Pain.     Historical Provider, MD   donepezil (ARICEPT ODT) 10 MG disintegrating tablet Take 5 mg by mouth nightly     Historical Provider, MD   aspirin 81 MG EC tablet Take 81 mg by mouth daily. Historical Provider, MD       Allergies:    Fentanyl and Penicillins    Social History:    reports that he quit smoking about 37 years ago. He quit after 20.00 years of use. He does not have any smokeless tobacco history on file. He reports that he does not drink alcohol and does not use drugs. Family History:   family history is not on file. He was adopted. PHYSICAL EXAM:  Vitals:  BP (!) 117/52   Pulse 68   Temp 97.6 °F (36.4 °C) (Oral)   Resp 27   SpO2 98%     General Appearance: alert and oriented to person and in no acute distress  Skin: warm and dry, pale  Head: normocephalic and atraumatic  Eyes: pupils equal, round, and reactive to light, conjunctivae normal  Neck: neck supple and non tender without mass   Pulmonary/Chest: clear to auscultation bilaterally- no wheezes, rales or rhonchi, no respiratory distress  Cardiovascular: normal rate, normal S1 and S2   Abdomen: soft, non-tender, non-distended, normal bowel sounds, no masses or organomegaly  Extremities: no cyanosis, no clubbing and no edema  Neurologic: no tremor and speech normal    LABS:  Recent Labs     08/29/21  0951      K 4.2      CO2 26   BUN 38*   CREATININE 2.0*   GLUCOSE 115*   CALCIUM 9.3       Recent Labs     08/29/21  0951 08/29/21  1442   WBC 10.2  --    RBC 2.11*  --    HGB 6.7* 7.5*   HCT 21.7* 23.0*   .8*  --    MCH 31.8  --    MCHC 30.9*  --    RDW 17.8*  --      --    MPV 10.7  --        No results for input(s): POCGLU in the last 72 hours. Radiology: No results found. EKG: Ordered    ASSESSMENT:      Principal Problem:    Upper GI bleed  Active Problems:    CKD (chronic kidney disease) stage 3, GFR 30-59 ml/min ([facility])    CAD (coronary artery disease), autologous vein bypass graft    CHF (congestive heart failure) ([facility])  Resolved Problems:    * No resolved hospital problems. *      PLAN:    1. Upper GI Bleed:  Hgb on admission was 6.7.   He received one unit of PRBCs. Repeat Hgb is 7.5. He had a recent transfusion on 8/10/21 at [facility] and has a history of bleeding ulcer that needed cauterized. GI consulted. Plavix and Eliquis placed on hold. Start IV PPI. 2. Chronic kidney disease stage 3:  Creatinine is 2.0 and BUN 38, around baseline  3. CAD:  Intracoronary stent in 1993. Left carotid endarterectomy in 2016. Continue Crestor, hold Plavix and Eliquis  4. Dementia: Continue Aricept. 5. Combined systolic and diastolic heart failure:  Last Echo on 7/19/21 with an EF of 35-40% and indeterminate diastolic dysfunction. Coreg and Entresto on hold. Monitor for signs of fluid overload. Patient follows with Harvinder Weinstein at [facility]   6. Depression:  Continue venlafaxine. 7. Recent hip replacement:  Total hip replacement performed at [facility].  PT/OT. Patient is from [facility] DR TOM CHAVEZ. Code Status:  Full  DVT prophylaxis: SCDs    NOTE: This report was transcribed using voice recognition software.  Every effort was made to ensure accuracy; however, inadvertent computerized transcription errors may be present.     Electronically signed by LAZARO Katz CNP on 8/29/2021 at 3:39 PM

## 2021-08-29 NOTE — ED PROVIDER NOTES
HPI:  8/29/21, Time: 1:57 PM EDT         Radha Liang is a 80 y.o. male presenting to the ED for anemia with Hgb 6.3 on labs from nursing home today. Patient has no complaints. He denies nausea, vomiting, hematemesis, melena. Per daughter, he had similar episode about a month ago where he was transfused with blood. He is on Eliquis. He is a full code. The source of bleeding was thought to have been a bleeding ulcer at that time. The complaint has been persistent, moderate in severity, and worsened by nothing. Patient denies fever/chills, sore throat, cough, congestion, chest pain, shortness of breath, edema, headache, visual disturbances, focal paresthesias, focal weakness, abdominal pain, nausea, vomiting, diarrhea, constipation, dysuria, hematuria, trauma, neck or back pain, rash or other complaints. ROS:   A complete review of systems was performed and all pertinent positives and negatives are stated within HPI, all other systems reviewed and are negative.      --------------------------------------------- PAST HISTORY ---------------------------------------------  Past Medical History:  has a past medical history of CAD (coronary artery disease), CHF [facility]congestive heart failure) ([facility] COPD (chronic obstructive pulmonary disease) ([facility] GERD (gastroesophageal reflux disease), Hyperlipidemia, Hypertension, and Myocardial infarction ([facility] Past Surgical History:  has a past surgical history that includes Coronary artery bypass graft (15 yrs ago); Colonoscopy; Cataract removal with implant (Right, 3/3/14); and Cataract removal with implant (Left, 10/13/2014). Social History:  reports that he quit smoking about 37 years ago. He quit after 20.00 years of use. He does not have any smokeless tobacco history on file. He reports that he does not drink alcohol and does not use drugs. Family History: family history is not on file.      The patients home medications have been 6.0 %    Basophils % 0.2 0.0 - 2.0 %    Neutrophils Absolute 7.89 (H) 1.80 - 7.30 E9/L    Immature Granulocytes # 0.08 E9/L    Lymphocytes Absolute 1.23 (L) 1.50 - 4.00 E9/L    Monocytes Absolute 0.77 0.10 - 0.95 E9/L    Eosinophils Absolute 0.20 0.05 - 0.50 E9/L    Basophils Absolute 0.02 0.00 - 0.20 E9/L   Comprehensive Metabolic Panel   Result Value Ref Range    Sodium 137 132 - 146 mmol/L    Potassium 4.2 3.5 - 5.0 mmol/L    Chloride 103 98 - 107 mmol/L    CO2 26 22 - 29 mmol/L    Anion Gap 8 7 - 16 mmol/L    Glucose 115 (H) 74 - 99 mg/dL    BUN 38 (H) 6 - 23 mg/dL    CREATININE 2.0 (H) 0.7 - 1.2 mg/dL    GFR Non-African American 32 >=60 mL/min/1.73    GFR African American 38     Calcium 9.3 8.6 - 10.2 mg/dL    Total Protein 6.2 (L) 6.4 - 8.3 g/dL    Albumin 2.7 (L) 3.5 - 5.2 g/dL    Total Bilirubin 0.2 0.0 - 1.2 mg/dL    Alkaline Phosphatase 85 40 - 129 U/L    ALT 8 0 - 40 U/L    AST 10 0 - 39 U/L   TYPE AND SCREEN   Result Value Ref Range    ABO/Rh B POS     Antibody Screen NEG    PREPARE RBC (CROSSMATCH), 1 Units   Result Value Ref Range    Product Code Blood Bank [id number]     Description Blood Bank Red Blood Cells, Leuko-reduced     Unit Number [id number]     Dispense Status Blood Bank issued        RADIOLOGY:  Interpreted by Radiologist.  No orders to display     ------------------------- NURSING NOTES AND VITALS REVIEWED ---------------------------  The nursing notes within the ED encounter and vital signs as below have been reviewed by myself. BP (!) 117/52   Pulse 68   Temp 97.6 °F (36.4 °C) (Oral)   Resp 27   SpO2 98%   Oxygen Saturation Interpretation: Normal      The patients available past medical records and past encounters were reviewed.         ------------------------------ ED COURSE/MEDICAL DECISION MAKING----------------------  Medications   0.9 % sodium chloride infusion (has no administration in time range)   0.9 % sodium chloride infusion (has no administration in time range) lactated ringers infusion ( IntraVENous New Bag 8/29/21 1418)   pantoprazole (PROTONIX) injection 40 mg (40 mg IntraVENous Given 8/29/21 1418)           Procedures:   none      Medical Decision Making:    One unit blood ordered to be transfused now. Melena stool in ER (thick and black and guiac positive). On Eliquis. IV protonix ordered. Had a similar episode back in July for this and Eliquis was held. Per daughter he had an EGD for this but does not know if it was done here or at facility and who did it. He is hemodynamically stable. This patient's ED course included: re-evaluation prior to disposition, IV medications, cardiac monitoring, continuous pulse oximetry and a personal history and physicial eaxmination    This patient has remained hemodynamically stable, remained unchanged and been closely monitored during their ED course. Re-Evaluations:  Time: 2:17 PM EDT   Re-evaluation. Patients symptoms show no change  Repeat physical examination is not changed      Consultations:   1:58 PM EDT  Spoke with Dr Tevin Villarreal, discussed case, accepts admission. Critical Care: none    I, Fco Bryson, DO, am the Primary Provider of Record    Counseling: The emergency provider has spoken with the patient and discussed todays results, in addition to providing specific details for the plan of care and counseling regarding the diagnosis and prognosis. Questions are answered at this time and they are agreeable with the plan.    --------------------------- IMPRESSION AND DISPOSITION ---------------------------------    IMPRESSION  1.  Upper GI bleed        DISPOSITION  Disposition: Admit to telemetry  Patient condition is stable             Lola Romero DO  08/29/21 0214

## 2021-08-30 PROBLEM — L89.620 DECUBITUS ULCER OF LEFT HEEL, UNSTAGEABLE (HCC): Status: ACTIVE | Noted: 2021-08-30

## 2021-08-30 PROBLEM — L89.152 PRESSURE INJURY OF SACRAL REGION, STAGE 2 (HCC): Status: ACTIVE | Noted: 2021-08-30

## 2021-08-30 LAB
ANION GAP SERPL CALCULATED.3IONS-SCNC: 11 MMOL/L (ref 7–16)
BASOPHILS ABSOLUTE: 0.06 E9/L (ref 0–0.2)
BASOPHILS RELATIVE PERCENT: 0.5 % (ref 0–2)
BUN BLDV-MCNC: 33 MG/DL (ref 6–23)
CALCIUM SERPL-MCNC: 9.1 MG/DL (ref 8.6–10.2)
CHLORIDE BLD-SCNC: 103 MMOL/L (ref 98–107)
CO2: 24 MMOL/L (ref 22–29)
CREAT SERPL-MCNC: 1.8 MG/DL (ref 0.7–1.2)
EOSINOPHILS ABSOLUTE: 0.15 E9/L (ref 0.05–0.5)
EOSINOPHILS RELATIVE PERCENT: 1.2 % (ref 0–6)
GFR AFRICAN AMERICAN: 43
GFR NON-AFRICAN AMERICAN: 36 ML/MIN/1.73
GLUCOSE BLD-MCNC: 96 MG/DL (ref 74–99)
HCT VFR BLD CALC: 23.7 % (ref 37–54)
HCT VFR BLD CALC: 24.8 % (ref 37–54)
HCT VFR BLD CALC: 25.5 % (ref 37–54)
HCT VFR BLD CALC: 25.7 % (ref 37–54)
HCT VFR BLD CALC: 25.8 % (ref 37–54)
HEMOGLOBIN: 7.9 G/DL (ref 12.5–16.5)
HEMOGLOBIN: 8 G/DL (ref 12.5–16.5)
HEMOGLOBIN: 8.3 G/DL (ref 12.5–16.5)
HEMOGLOBIN: 8.3 G/DL (ref 12.5–16.5)
HEMOGLOBIN: 8.5 G/DL (ref 12.5–16.5)
IMMATURE GRANULOCYTES #: 0.1 E9/L
IMMATURE GRANULOCYTES %: 0.8 % (ref 0–5)
LYMPHOCYTES ABSOLUTE: 1.15 E9/L (ref 1.5–4)
LYMPHOCYTES RELATIVE PERCENT: 9.4 % (ref 20–42)
MCH RBC QN AUTO: 31.4 PG (ref 26–35)
MCHC RBC AUTO-ENTMCNC: 32.3 % (ref 32–34.5)
MCV RBC AUTO: 97.3 FL (ref 80–99.9)
MONOCYTES ABSOLUTE: 0.89 E9/L (ref 0.1–0.95)
MONOCYTES RELATIVE PERCENT: 7.3 % (ref 2–12)
NEUTROPHILS ABSOLUTE: 9.86 E9/L (ref 1.8–7.3)
NEUTROPHILS RELATIVE PERCENT: 80.8 % (ref 43–80)
PDW BLD-RTO: 19.1 FL (ref 11.5–15)
PLATELET # BLD: 263 E9/L (ref 130–450)
PMV BLD AUTO: 10.5 FL (ref 7–12)
POTASSIUM REFLEX MAGNESIUM: 3.6 MMOL/L (ref 3.5–5)
RBC # BLD: 2.64 E12/L (ref 3.8–5.8)
SODIUM BLD-SCNC: 138 MMOL/L (ref 132–146)
WBC # BLD: 12.2 E9/L (ref 4.5–11.5)

## 2021-08-30 PROCEDURE — C9113 INJ PANTOPRAZOLE SODIUM, VIA: HCPCS | Performed by: INTERNAL MEDICINE

## 2021-08-30 PROCEDURE — 6360000002 HC RX W HCPCS: Performed by: INTERNAL MEDICINE

## 2021-08-30 PROCEDURE — 85025 COMPLETE CBC W/AUTO DIFF WBC: CPT

## 2021-08-30 PROCEDURE — APPSS30 APP SPLIT SHARED TIME 16-30 MINUTES: Performed by: NURSE PRACTITIONER

## 2021-08-30 PROCEDURE — 85014 HEMATOCRIT: CPT

## 2021-08-30 PROCEDURE — 2580000003 HC RX 258: Performed by: INTERNAL MEDICINE

## 2021-08-30 PROCEDURE — 80048 BASIC METABOLIC PNL TOTAL CA: CPT

## 2021-08-30 PROCEDURE — 6370000000 HC RX 637 (ALT 250 FOR IP): Performed by: INTERNAL MEDICINE

## 2021-08-30 PROCEDURE — 97161 PT EVAL LOW COMPLEX 20 MIN: CPT | Performed by: PHYSICAL THERAPIST

## 2021-08-30 PROCEDURE — 36415 COLL VENOUS BLD VENIPUNCTURE: CPT

## 2021-08-30 PROCEDURE — 99232 SBSQ HOSP IP/OBS MODERATE 35: CPT | Performed by: INTERNAL MEDICINE

## 2021-08-30 PROCEDURE — 97530 THERAPEUTIC ACTIVITIES: CPT | Performed by: PHYSICAL THERAPIST

## 2021-08-30 PROCEDURE — 2060000000 HC ICU INTERMEDIATE R&B

## 2021-08-30 PROCEDURE — 85018 HEMOGLOBIN: CPT

## 2021-08-30 RX ADMIN — Medication 1 TABLET: at 11:17

## 2021-08-30 RX ADMIN — DOCUSATE SODIUM 100 MG: 100 CAPSULE, LIQUID FILLED ORAL at 21:56

## 2021-08-30 RX ADMIN — SODIUM CHLORIDE, POTASSIUM CHLORIDE, SODIUM LACTATE AND CALCIUM CHLORIDE: 600; 310; 30; 20 INJECTION, SOLUTION INTRAVENOUS at 08:32

## 2021-08-30 RX ADMIN — PANTOPRAZOLE SODIUM 40 MG: 40 INJECTION, POWDER, FOR SOLUTION INTRAVENOUS at 08:33

## 2021-08-30 RX ADMIN — DONEPEZIL HYDROCHLORIDE 5 MG: 5 TABLET, ORALLY DISINTEGRATING ORAL at 21:55

## 2021-08-30 RX ADMIN — Medication 10 ML: at 08:34

## 2021-08-30 RX ADMIN — SACUBITRIL AND VALSARTAN 1 TABLET: 24; 26 TABLET, FILM COATED ORAL at 21:55

## 2021-08-30 RX ADMIN — SODIUM CHLORIDE, PRESERVATIVE FREE 10 ML: 5 INJECTION INTRAVENOUS at 08:33

## 2021-08-30 RX ADMIN — DOCUSATE SODIUM 100 MG: 100 CAPSULE, LIQUID FILLED ORAL at 08:32

## 2021-08-30 RX ADMIN — VENLAFAXINE 37.5 MG: 37.5 TABLET ORAL at 21:55

## 2021-08-30 RX ADMIN — ROSUVASTATIN CALCIUM 10 MG: 10 TABLET, COATED ORAL at 08:32

## 2021-08-30 RX ADMIN — Medication 10 ML: at 21:55

## 2021-08-30 RX ADMIN — PANTOPRAZOLE SODIUM 40 MG: 40 INJECTION, POWDER, FOR SOLUTION INTRAVENOUS at 21:55

## 2021-08-30 RX ADMIN — VENLAFAXINE 37.5 MG: 37.5 TABLET ORAL at 08:32

## 2021-08-30 RX ADMIN — FERROUS SULFATE TAB 325 MG (65 MG ELEMENTAL FE) 325 MG: 325 (65 FE) TAB at 08:32

## 2021-08-30 ASSESSMENT — ENCOUNTER SYMPTOMS
ABDOMINAL PAIN: 0
RESPIRATORY NEGATIVE: 1
ANAL BLEEDING: 0
BLOOD IN STOOL: 0
DIARRHEA: 0
CONSTIPATION: 0
NAUSEA: 0

## 2021-08-30 ASSESSMENT — PAIN SCALES - GENERAL
PAINLEVEL_OUTOF10: 0

## 2021-08-30 NOTE — PROGRESS NOTES
[address] [place], [place] [address]ist   Progress Note    Admitting Date and Time: 8/29/2021  9:17 AM  Admit Dx: Upper GI bleed [K92.2]    Subjective:    Pt voiced no complaints of discomfort. Patient had a melena stool while NP was in the room. Patient follows some simple commands. ROS: denies fever, chills, cp, sob, n/v, HA unless stated above.      vitamin B and C  1 tablet Oral Daily    bisacodyl  10 mg Rectal Daily    [Held by provider] carvedilol  6.25 mg Oral BID WC    [Held by provider] clopidogrel  75 mg Oral Daily    docusate sodium  100 mg Oral BID    donepezil  5 mg Oral Nightly    pantoprazole  40 mg IntraVENous BID    And    sodium chloride (PF)  10 mL IntraVENous BID    ferrous sulfate  325 mg Oral Daily with breakfast    rosuvastatin  10 mg Oral Daily    [Held by provider] sacubitril-valsartan  1 tablet Oral BID    [Held by provider] torsemide  5 mg Oral Every Other Day    venlafaxine  37.5 mg Oral BID    sodium chloride flush  5-40 mL IntraVENous 2 times per day     sodium chloride, , PRN  sodium chloride flush, 5-40 mL, PRN  sodium chloride, 25 mL, PRN  ondansetron, 4 mg, Q8H PRN   Or  ondansetron, 4 mg, Q6H PRN  polyethylene glycol, 17 g, Daily PRN  acetaminophen, 650 mg, Q6H PRN   Or  acetaminophen, 650 mg, Q6H PRN         Objective:    BP (!) 148/62   Pulse 91   Temp 98.2 °F (36.8 °C) (Oral)   Resp 24   Ht 6' 4\" (1.93 m)   Wt 180 lb (81.6 kg)   SpO2 95%   BMI 21.91 kg/m²   General Appearance: alert and oriented to person with some intermittent confusion and in no acute distress  Skin: warm and dry  Head: normocephalic and atraumatic  Eyes: pupils equal, round, and reactive to light, extraocular eye movements intact, conjunctivae normal  Neck: neck supple and non tender without mass   Pulmonary/Chest: diminished but clear to auscultation bilaterally- no wheezes, rales or rhonchi, normal air movement, no respiratory distress  Cardiovascular: normal rate, normal S1 and S2 and no carotid bruits  Abdomen: soft, non-tender, non-distended, normal bowel sounds   Extremities: no cyanosis, no clubbing and no edema  Neurologic: no cranial nerve deficit and speech normal      Recent Labs     08/29/21  0951 08/30/21  0612    138   K 4.2 3.6    103   CO2 26 24   BUN 38* 33*   CREATININE 2.0* 1.8*   GLUCOSE 115* 96   CALCIUM 9.3 9.1       Recent Labs     08/29/21 0951   ALKPHOS 85   PROT 6.2*   LABALBU 2.7*   BILITOT 0.2   AST 10   ALT 8       Recent Labs     08/29/21  0951 08/29/21  1442 08/29/21  1829 08/30/21  0048 08/30/21 0612   WBC 10.2  --   --   --  12.2*   RBC 2.11*  --   --   --  2.64*   HGB 6.7*   < > 7.8* 8.3* 8.3*   HCT 21.7*   < > 24.1* 25.5* 25.7*   .8*  --   --   --  97.3   MCH 31.8  --   --   --  31.4   MCHC 30.9*  --   --   --  32.3   RDW 17.8*  --   --   --  19.1*     --   --   --  263   MPV 10.7  --   --   --  10.5    < > = values in this interval not displayed. Radiology:   No orders to display       Assessment:  Principal Problem:    Upper GI bleed  Active Problems:    CKD (chronic kidney disease) stage 3, GFR 30-59 ml/min ([facility])    CAD (coronary artery disease), autologous vein bypass graft    CHF (congestive heart failure) ([facility])    Acute blood loss anemia  Resolved Problems:    * No resolved hospital problems. *      Plan:  1. Upper GI bleed - S/p 2 units PRBCs since admission - melena stool noted today - continue PPI - EGD planned for tomorrow - NPO after midnight - per GI okay for Clear liquid - holding triple therapy anticoagulation - monitor cbc     2. Combined systolic and diastolic HFrEF - no evidence of fluid over load - last echo was July 2021, EF 35-40% - no chest pain or shortness of breath - Entresto     3. Acute kidney injury superimposed on chronic kidney disease - creatinine slowly improving with IV LR - will follow renal function     4.  Coronary artery disease - S/p 3 stent in 1993 - S/p carotid endartectomy inn 2016 - holding Plavix, Aspirin and Eliquis     5. Essential hypertension - BP stable     6. Hyperlipidemia - on statin     7. Acute blood loss anemia - continue serial H&H - GI following - will monitor     8. Depression - pleasant and cooperative - remains on Effexor      9. Dementia - on Aricept     10. S/p Left total hip replacement - PT/OT following - surgery at [facility] - from [facility] [place] by [facility] DR TOM CHAVEZ     11. Heel wound  - wound care nurse consulted     NOTE: This report was transcribed using voice recognition software. Every effort was made to ensure accuracy; however, inadvertent computerized transcription errors may be present.      Electronically signed by LAZARO Montoya CNP on 8/30/2021 at 8:19 AM

## 2021-08-30 NOTE — FLOWSHEET NOTE
Pt loss his wife in Jan 2021.   Daughter states that she feels is slightly or mildly depressed since his fall in July and lossing his wife in Jan.

## 2021-08-30 NOTE — CONSULTS
[facility]  Dr. Evita Ortega M.D., Dr. Roxane Lopez M.D., Dr. Bernice Kim D.O., Dr. Katharine Alston M.D., LUCAS Rosas.O. Patient Name: Shannan Molina  MRN: [id number]  : 1932 (80 y.o. male)  Allergies: is allergic to fentanyl and penicillins. Date of Service: 2021       Reason for Consultation:  Anemia    HISTORY OF PRESENT ILLNESS:      The patient is a 80 y.o. male with a history of CKD, CAD, COPD, CHF, HTN, and HLD who presents with anemia. He was sent by nursing facility for low Hgb 6.3. After admission, he apparently had an episode of melena - per chart review - but he does not recall such an episode. He denies melena, hematochezia, hematemesis. He does take Eliquis, aspirin, and Plavix, but he does not know why. He denies abdominal pain. He reports that he is never hungry, but that he can eat. He denies recent NSAID use. Of note he did have a recent ER presentation for blood transfusion on , but was not admitted at that time. He also had a recent admission for the same, and required two units blood transfusion at that time. His anticoagulant/antiplatelet medications were temporarily held, but resumed on discharge. He did not undergo endoscopic evaluation at that time. Per chart review, he reportedly did have and EGD recently, but he is unable to provide details, and no records are available. REVIEW OF SYSTEMS:   Review of Systems   Constitutional: Negative for activity change and appetite change. HENT: Negative for nosebleeds. Respiratory: Negative. Cardiovascular: Negative. Gastrointestinal: Negative for abdominal pain, anal bleeding, blood in stool, constipation, diarrhea and nausea. All other systems reviewed and are negative.           Past Medical History:  Past Medical History:   Diagnosis Date    CAD (coronary artery disease)     [facility]congestive [facility]) ([facility]     COPD (chronic obstructive pulmonary disease) ([facility]     GERD (gastroesophageal reflux disease)     Hyperlipidemia     Hypertension     Myocardial infarction ([facility] 15 yrs ago       Past Surgical History:  Past Surgical History:   Procedure Laterality Date    CATARACT REMOVAL WITH IMPLANT Right 3/3/14    CATARACT REMOVAL WITH IMPLANT Left 10/13/2014    COLONOSCOPY      CORONARY ARTERY BYPASS GRAFT  15 yrs ago    3 stents    ENDOSCOPY, COLON, DIAGNOSTIC         Home Medications:  Prior to Admission medications    Medication Sig Start Date End Date Taking? Authorizing Provider   sucralfate (CARAFATE) 1 GM tablet Take 1 g by mouth 3 times daily (with meals)   Yes Historical Provider, MD   dronabinol (MARINOL) 2.5 MG capsule Take 2.5 mg by mouth 2 times daily (before meals).    Yes Historical Provider, MD   b complex vitamins capsule Take 1 capsule by mouth daily    Historical Provider, MD   carvedilol (COREG) 6.25 MG tablet Take 6.25 mg by mouth 2 times daily (with meals)    Historical Provider, MD   clopidogrel (PLAVIX) 75 MG tablet Take 75 mg by mouth daily    Historical Provider, MD   Coenzyme Q10 (CO Q-10) 100 MG CAPS Take 1 capsule by mouth daily    Historical Provider, MD   docusate sodium (COLACE) 100 MG capsule Take 100 mg by mouth 2 times daily    Historical Provider, MD   apixaban (ELIQUIS) 2.5 MG TABS tablet Take 2.5 mg by mouth 2 times daily    Historical Provider, MD   sacubitril-valsartan (ENTRESTO) 24-26 MG per tablet Take 1 tablet by mouth 2 times daily    Historical Provider, MD   famotidine (PEPCID) 20 MG tablet Take 20 mg by mouth daily    Historical Provider, MD   ferrous sulfate (IRON 325) 325 (65 Fe) MG tablet Take 325 mg by mouth daily (with breakfast)    Historical Provider, MD   rosuvastatin (CRESTOR) 10 MG tablet Take 10 mg by mouth daily    Historical Provider, MD   torsemide (DEMADEX) 10 MG tablet Take 5 mg by mouth every other day    Historical Provider, MD   venlafaxine (EFFEXOR) 37.5 MG tablet Take 37.5 mg by mouth 2 times daily Historical Provider, MD   bisacodyl (DULCOLAX) 10 MG suppository Place 10 mg rectally daily    Historical Provider, MD   magnesium hydroxide (MILK OF MAGNESIA) 400 MG/5ML suspension Take by mouth daily as needed for Constipation    Historical Provider, MD   traMADol (ULTRAM) 50 MG tablet Take 50 mg by mouth every 4 hours as needed for Pain. Historical Provider, MD   donepezil (ARICEPT ODT) 10 MG disintegrating tablet Take 5 mg by mouth nightly     Historical Provider, MD   aspirin 81 MG EC tablet Take 81 mg by mouth daily. Historical Provider, MD       Allergies: Fentanyl and Penicillins    Social History:  Social History     Socioeconomic History    Marital status:      Spouse name: Not on file    Number of children: Not on file    Years of education: Not on file    Highest education level: Not on file   Occupational History    Not on file   Tobacco Use    Smoking status: Former Smoker     Years: 20.00     Quit date: 1984     Years since quittin.5   Substance and Sexual Activity    Alcohol use: No     Comment: occas    Drug use: No    Sexual activity: Not Currently   Other Topics Concern    Not on file   Social History Narrative    Not on file     Social Determinants of Health     Financial Resource Strain: Low Risk     Difficulty of Paying Living Expenses: Not hard at all   Food Insecurity: No Food Insecurity    Worried About Running Out of Food in the Last Year: Never true    La of Food in the Last Year: Never true   Transportation Needs: Unmet Transportation Needs    Lack of Transportation (Medical): Yes    Lack of Transportation (Non-Medical): Yes   Physical Activity: Inactive    Days of Exercise per Week: 0 days    Minutes of Exercise per Session: 0 min   Stress: No Stress Concern Present    Feeling of Stress : Not at all   Social Connections: Moderately Isolated    Frequency of Communication with Friends and Family:  Three times a week    Frequency of Social Gatherings with Friends and Family: More than three times a week    Attends [employer] Services: More than 4 times per year    Active Member of Clubs or Organizations: No    Attends Club or Organization Meetings: Never    Marital Status:    Intimate Partner Violence: Not At Risk    Fear of Current or Ex-Partner: No    Emotionally Abused: No    Physically Abused: No    Sexually Abused: No       Family History:  Family History   Adopted: Yes         PHYSICAL EXAM:  Vital Signs: BP (!) 142/60   Pulse 86   Temp 98.8 °F (37.1 °C) (Oral)   Resp 18   Ht 6' 4\" (1.93 m)   Wt 180 lb (81.6 kg)   SpO2 93%   BMI 21.91 kg/m²   GENERAL APPEARANCE:  awake, alert, oriented, cooperative, and in no acute distress, cachectic  EYES:  Lids and lashes normal, PERRLA, EOMI, sclera clear, conjunctiva pale  HENT:  Normocephalic, without obvious abnormality  NECK:  No JVD or thyromegaly. LUNGS:  Clear to auscultation bilaterally with no wheezes, rales or rhonchi. No increased work of breathing  CARDIOVASCULAR: Regular rate and rhythm, no murmur  ABDOMEN:  normal bowel sounds in all 4 quadrants, soft, non-distended, non-tender, no masses palpated, no hepatosplenomegally. Rectal exam with black stool noted perianally and on sheets. MUSCULOSKELETAL:  There is no redness, warmth, or swelling of the joints. EXTREMITIES: No edema, 2+ pulses bilaterally (radial and dorsalis pedis)  NEUROLOGIC:  Awake, alert, oriented to name, place and time, but unaware why he is in the hospital.  SKIN: Normal skin color, texture, and turgor. There is no redness, warmth, or swelling. No bruising or bleeding, no mottling.    PSYCH: Affect is withdrawn and blunted      DATA:  Results for orders placed or performed during the hospital encounter of 08/29/21   CBC auto differential   Result Value Ref Range    WBC 10.2 4.5 - 11.5 E9/L    RBC 2.11 (L) 3.80 - 5.80 E12/L    Hemoglobin 6.7 (LL) 12.5 - 16.5 g/dL    Hematocrit 21.7 (L) 37.0 - 54.0 % .8 (H) 80.0 - 99.9 fL    MCH 31.8 26.0 - 35.0 pg    MCHC 30.9 (L) 32.0 - 34.5 %    RDW 17.8 (H) 11.5 - 15.0 fL    Platelets 587 156 - 763 E9/L    MPV 10.7 7.0 - 12.0 fL    Neutrophils % 77.3 43.0 - 80.0 %    Immature Granulocytes % 0.8 0.0 - 5.0 %    Lymphocytes % 12.1 (L) 20.0 - 42.0 %    Monocytes % 7.6 2.0 - 12.0 %    Eosinophils % 2.0 0.0 - 6.0 %    Basophils % 0.2 0.0 - 2.0 %    Neutrophils Absolute 7.89 (H) 1.80 - 7.30 E9/L    Immature Granulocytes # 0.08 E9/L    Lymphocytes Absolute 1.23 (L) 1.50 - 4.00 E9/L    Monocytes Absolute 0.77 0.10 - 0.95 E9/L    Eosinophils Absolute 0.20 0.05 - 0.50 E9/L    Basophils Absolute 0.02 0.00 - 0.20 E9/L   Comprehensive Metabolic Panel   Result Value Ref Range    Sodium 137 132 - 146 mmol/L    Potassium 4.2 3.5 - 5.0 mmol/L    Chloride 103 98 - 107 mmol/L    CO2 26 22 - 29 mmol/L    Anion Gap 8 7 - 16 mmol/L    Glucose 115 (H) 74 - 99 mg/dL    BUN 38 (H) 6 - 23 mg/dL    CREATININE 2.0 (H) 0.7 - 1.2 mg/dL    GFR Non-African American 32 >=60 mL/min/1.73    GFR African American 38     Calcium 9.3 8.6 - 10.2 mg/dL    Total Protein 6.2 (L) 6.4 - 8.3 g/dL    Albumin 2.7 (L) 3.5 - 5.2 g/dL    Total Bilirubin 0.2 0.0 - 1.2 mg/dL    Alkaline Phosphatase 85 40 - 129 U/L    ALT 8 0 - 40 U/L    AST 10 0 - 39 U/L   Hemoglobin and hematocrit, blood   Result Value Ref Range    Hemoglobin 7.8 (L) 12.5 - 16.5 g/dL    Hematocrit 24.1 (L) 37.0 - 54.0 %   Hemoglobin and hematocrit, blood   Result Value Ref Range    Hemoglobin 8.3 (L) 12.5 - 16.5 g/dL    Hematocrit 25.5 (L) 37.0 - 54.0 %   Hemoglobin and hematocrit, blood   Result Value Ref Range    Hemoglobin 7.5 (L) 12.5 - 16.5 g/dL    Hematocrit 23.0 (L) 37.0 - 54.0 %   CBC auto differential   Result Value Ref Range    WBC 12.2 (H) 4.5 - 11.5 E9/L    RBC 2.64 (L) 3.80 - 5.80 E12/L    Hemoglobin 8.3 (L) 12.5 - 16.5 g/dL    Hematocrit 25.7 (L) 37.0 - 54.0 %    MCV 97.3 80.0 - 99.9 fL    MCH 31.4 26.0 - 35.0 pg    MCHC 32.3 32.0 - 34.5 % RDW 19.1 (H) 11.5 - 15.0 fL    Platelets 134 911 - 287 E9/L    MPV 10.5 7.0 - 12.0 fL    Neutrophils % 80.8 (H) 43.0 - 80.0 %    Immature Granulocytes % 0.8 0.0 - 5.0 %    Lymphocytes % 9.4 (L) 20.0 - 42.0 %    Monocytes % 7.3 2.0 - 12.0 %    Eosinophils % 1.2 0.0 - 6.0 %    Basophils % 0.5 0.0 - 2.0 %    Neutrophils Absolute 9.86 (H) 1.80 - 7.30 E9/L    Immature Granulocytes # 0.10 E9/L    Lymphocytes Absolute 1.15 (L) 1.50 - 4.00 E9/L    Monocytes Absolute 0.89 0.10 - 0.95 E9/L    Eosinophils Absolute 0.15 0.05 - 0.50 E9/L    Basophils Absolute 0.06 0.00 - 0.20 C2/W   Basic Metabolic Panel w/ Reflex to MG   Result Value Ref Range    Sodium 138 132 - 146 mmol/L    Potassium reflex Magnesium 3.6 3.5 - 5.0 mmol/L    Chloride 103 98 - 107 mmol/L    CO2 24 22 - 29 mmol/L    Anion Gap 11 7 - 16 mmol/L    Glucose 96 74 - 99 mg/dL    BUN 33 (H) 6 - 23 mg/dL    CREATININE 1.8 (H) 0.7 - 1.2 mg/dL    GFR Non-African American 36 >=60 mL/min/1.73    GFR African American 43     Calcium 9.1 8.6 - 10.2 mg/dL   EKG 12 Lead   Result Value Ref Range    Ventricular Rate 73 BPM    Atrial Rate 73 BPM    P-R Interval 248 ms    QRS Duration 174 ms    Q-T Interval 452 ms    QTc Calculation (Bazett) 497 ms    P Axis 92 degrees    R Axis -61 degrees    T Axis 110 degrees   TYPE AND SCREEN   Result Value Ref Range    ABO/Rh B POS     Antibody Screen NEG    PREPARE RBC (CROSSMATCH), 1 Units   Result Value Ref Range    Product Code Blood Bank [id number]     Description Blood Bank Red Blood Cells, Leuko-reduced     Unit Number [id number]     Dispense Status Blood Bank transfused          IMAGING:  No results found.       ASSESSMENT and PLAN:    # Acute on chronic anemia  # Melena/Possible UGIB  - Hgb 6.7 on admission, from baseline 8-9  - One reported episode of melena, with black stool noted on rectal exam as above  - Hgb now 8.3 after 1u PRBC  - Protonix 40mg IV BID/Type and Cross 2 Units on hold/Elevate HOB 30 Degrees/q 6 Hr Hgb transfuse DISPLAY PLAN FREE TEXT

## 2021-08-30 NOTE — FLOWSHEET NOTE
Inpatient Wound Care    Admit Date: 8/29/2021  9:17 AM    Reason for consult:  Mult wounds    Significant history:    Past Medical History:   Diagnosis Date    CAD (coronary artery disease)     [facility]     COPD (chronic obstructive pulmonary disease) ([facility]     GERD (gastroesophageal reflux disease)     Hyperlipidemia     Hypertension     [facility] ([facility] yrs ago       Wound history:      Findings:       08/30/21 1603   Wound 08/30/21 Heel Left   Date First Assessed/Time First Assessed: 08/30/21 1603   Primary Wound Type: Pressure Injury  Location: Heel  Wound Location Orientation: Left   Wound Image    Wound Etiology Pressure Unstageable  (Simultaneous filing. User may not have seen previous data.)   Dressing Status New dressing applied   Wound Cleansed Cleansed with saline  (Simultaneous filing. User may not have seen previous data.)   Dressing/Treatment Foam  (Simultaneous filing. User may not have seen previous data.)   Offloading for Diabetic Foot Ulcers Offloading boot   Wound Length (cm) 3 cm   Wound Width (cm) 3 cm   Wound Surface Area (cm^2) 9 cm^2   Drainage Amount None   Odor None   Dominique-wound Assessment Blanchable erythema   Wound 08/30/21 Ankle Right; Outer   Date First Assessed/Time First Assessed: 08/30/21 1605   Primary Wound Type: Pressure Injury  Location: Ankle  Wound Location Orientation: Right; Outer   Wound Etiology Deep tissue/Injury   Wound Cleansed Cleansed with saline   Wound Length (cm) 2 cm   Wound Width (cm) 1 cm   Wound Surface Area (cm^2) 2 cm^2   Wound Assessment Purple/maroon   Drainage Amount None   Odor None   Dominique-wound Assessment Blanchable erythema   Wound 08/30/21 Heel Right red / black   Date First Assessed/Time First Assessed: 08/30/21 1605   Primary Wound Type: Pressure Injury  Location: Heel  Wound Location Orientation: Right  Wound Description (Comments): red / black   Wound Etiology Deep tissue/Injury   Wound Cleansed Cleansed with saline   Wound Length (cm) 1 cm   Wound Width (cm) 1 cm   Wound Surface Area (cm^2) 1 cm^2   Drainage Amount None   Odor None   Wound 08/30/21 Sacrum Medial;Mid purple area with open area in center   Date First Assessed/Time First Assessed: 08/30/21 1606   Primary Wound Type: Pressure Injury  Location: Sacrum  Wound Location Orientation: Medial;Mid  Wound Description (Comments): purple area with open area in center   Wound Etiology Pressure Stage  2   Wound Cleansed Cleansed with saline   Wound Length (cm) 1 cm   Wound Width (cm) 1 cm   Wound Surface Area (cm^2) 1 cm^2   Odor None   Wound 08/30/21 Thigh Distal;Right;Posterior line across back of thigh, scabbed and healing   Date First Assessed/Time First Assessed: 08/30/21 1606   Primary Wound Type: Pressure Injury  Location: Thigh  Wound Location Orientation: Distal;Right;Posterior  Wound Description (Comments): line across back of thigh, scabbed and healing   Wound Etiology   (healing)   Wound Length (cm) 0.5 cm   Wound Width (cm) 5 cm   Wound Surface Area (cm^2) 2.5 cm^2   Drainage Amount None   Odor None   Wound 08/30/21 Ankle Right;Lateral dark black and purple pressure areas lateral ankle / foot   Date First Assessed/Time First Assessed: 08/30/21 1607   Present on Hospital Admission: Yes  Primary Wound Type: Pressure Injury  Location: Ankle  Wound Location Orientation: Right;Lateral  Wound Description (Comments): dark black and purple pressure . ..    Wound Cleansed Cleansed with saline   Wound Length (cm) 2 cm   Wound Width (cm) 1 cm   Wound Surface Area (cm^2) 2 cm^2   Drainage Amount None   Odor None   Dominique-wound Assessment Blanchable erythema       Impression:  Right ankle right heel deep tissue injury  Coccyx stage 2 pressure injury  Left heel unstageable    Interventions in place:    mepilex dressing    Plan:  Cont mepilex dressing      Deirdre Ayala RN 8/30/2021 4:19 PM

## 2021-08-30 NOTE — PROGRESS NOTES
Physical Therapy    Physical Therapy Initial Evaluation/Plan of Care    Room #:  3913/8419-85  Patient Name: Frank Schwab  YOB: 1932  MRN: [id number]    Date of Service: 8/30/2021     Tentative placement recommendation: Subacute rehab  Equipment recommendation: To be determined      Evaluating Physical Therapist: Chantel Enriquez, PT  #[id number]        Specific Provider Orders/Date/Referring Provider :  08/30/21 1000   PT eval and treat Start: 08/30/21 1000, End: 08/30/21 1000, ONE TIME, Standing Count: 1 Occurrences, R    Alberto Bowden DO    Admitting Diagnosis:   Upper GI bleed [K92.2]       Admitted with  anemia     Patient Active Problem List   Diagnosis    Renal insufficiency    CKD (chronic kidney disease) stage 3, G[facility] 30-59 ml[facility]min [facility]    Essential hypertension    CAD (coronary artery disease), autologous vein bypass graft    Acid reflux    Anemia    Depression    Diastolic congestive heart failure (HCC)    [facility]    CAD (coronary artery disease)    [facility]chronic obstructive pulmonary disease) [facility]    Acute respiratory failure with hypoxia ([facility])    Acute blood loss anemia    MAVIS (acute kidney injury) ([facility]    Hospital-acquired pneumonia    Moderate protein-energy malnutrition ([facility])    Upper GI bleed        ASSESSMENT of Current Deficits Patient exhibits decreased strength, balance, endurance, range of motion and pain bilateral heels with open wound impairing functional mobility, transfers, gait , gait distance and tolerance to activity are barriers to d/c and require skilled intervention during hospital stay to attain pre hospital level of function. Decreased balance and endurance    increases patient's risk for fall.         PHYSICAL THERAPY  PLAN OF CARE       Physical therapy plan of care is established based on physician order,  patient diagnosis and clinical assessment    Current Treatment Recommendations:    -Bed Mobility: Lower extremity exercises , Upper extremity exercises  and Trunk control activities   -Sitting Balance: Incorporate reaching activities to activate trunk muscles   -Standing Balance: Perform strengthening exercises in standing to promote motor control with or without upper extremity support  and Challenge balance utilizing reaching  activities beyond center of gravity    -Transfers: Provide instruction on proper hand and foot position for adequate transfer of weight onto lower extremities and use of gait device, Cues for hand placement, technique and safety, Assist with extension of knees trunk and hip to accept weight transfer  and Provide stabilization to prevent fall   -Gait: Gait training, Standing activities to improve: base of support, weight shift, weight bearing , Exercises to improve trunk control and Exercises to improve hip and knee control   -Endurance: Utilize Supervised activities to increase level of endurance to allow for safe functional mobility including transfers and gait     PT long term treatment goals are located in below grid    Patient and or family understand(s) diagnosis, prognosis, and plan of care. Frequency of treatments: Patient will be seen  daily. Prior Level of Function: Patient ambulated with wheeled walker    Rehab Potential: good    for baseline    Past medical history:   Past Medical History:   Diagnosis Date    CAD (coronary artery disease)     [facility]     COPD [facility]ronic obstructive pulmonary disease) [facility]     GERD (gastroesophageal reflux disease)     Hyperlipidemia     Hypertension     Myocardial infarction ([facility] 15 yrs ago     Past Surgical History:   Procedure Laterality Date    CATARACT REMOVAL WITH IMPLANT Right 3/3/14    CATARACT REMOVAL WITH IMPLANT Left 10/13/2014    COLONOSCOPY      CORONARY ARTERY BYPASS GRAFT  15 yrs ago    3 stents    ENDOSCOPY, COLON, DIAGNOSTIC         SUBJECTIVE:    Precautions:  Up with assistance, falls and alarm ,  Hip precautions? Social history: Patient lives with daughterMarlene  Wife  in january in a ranch home  with 2 steps  to enter with [employer] owned: Marilou CastLuis Alfredo vera,     [id number] [facility] Mobility Inpatient   How much difficulty turning over in bed?: A Little  How much difficulty sitting down on / standing up from a chair with arms?: A Lot  How much difficulty moving from lying on back to sitting on side of bed?: A Lot  How much help from another person moving to and from a bed to a chair?: A Lot  How much help from another person needed to walk in hospital room?: A Lot  How much help from another person for climbing 3-5 steps with a railing?: A Lot  AM-PAC Inpatient Mobility Raw Score : 13  AM-PAC Inpatient T-Scale Score : 36.74  Mobility Inpatient CMS 0-100% Score: 64.91  Mobility Inpatient CMS G-Code Modifier : CL    Nursing cleared patient for PT evaluation. The admitting diagnosis and active problem list as listed above have been reviewed prior to the initiation of this evaluation. OBJECTIVE;   Initial Evaluation  Date: 2021 Treatment Date:     Short Term/ Long Term   Goals   Was pt agreeable to Eval/treatment? Yes  To be met in 3 days   Pain level   5/10  bilateral heels     Bed Mobility    Rolling: Minimal assist of 1    Supine to sit: Moderate assist of 1    Sit to supine: Moderate assist of 1    Scooting: Minimal assist of 1    Rolling: Independent    Supine to sit: Independent    Sit to supine: Independent    Scooting: Independent     Transfers Sit to stand:  Moderate assist of 1    Sit to stand: Supervision      Ambulation    2 steps using  wheeled walker with Moderate assist of 1   for walker control, upright and weight shift and cues for safety and proper hand placement   50 feet using  wheeled walker with Supervision     Stair negotiation: ascended and descended   Not assessed     2 steps with rail min a   ROM Within functional limits  with exception of . Increase range of motion 10% of affected joints    Strength BUE:  3+/5  RLE:  4/5  LLE:  3/5  Wound on heel  Increase strength in affected mm groups by 1/3 grade   Balance Sitting EOB:  good    Dynamic Standing:  poor    Sitting EOB:  good    Dynamic Standing: fair       Patient is Alert & Oriented x person, place, time and situation and follows one step directions  Flat affect  Sensation:  Patient  denies numbness/tingling   Edema:  yes left lower extremity   Endurance: poor tolerance to upright with fatigue       Patient education  Patient educated on role of Physical Therapy, risks of immobility, safety and plan of care and  importance of mobility while in hospital      Patient response to education:   Pt verbalized understanding Pt demonstrated skill Pt requires further education in this area   Yes Partial Yes      Treatment:  Patient practiced and was instructed/facilitated in the following treatment: Patient   Sat edge of bed 10 minutes with Supervision  to increase dynamic sitting balance and activity tolerance. seated and standing challenges     Therapeutic Exercises:  ankle pumps  x 10 reps. At end of session, patient in bed heels elevated with alarm call light and phone within reach,  all lines and tubes intact, nursing notified. Patient would benefit from continued skilled Physical Therapy to improve functional independence and quality of life. Patient's/ family goals   home    Time in  5  Time out  1132    Total Treatment Time  10   minutes    Evaluation time includes thorough review of current medical information, gathering information on past medical history/social history and prior level of function, completion of standardized testing/informal observation of tasks, assessment of data, and development of Plan of care and goals.      CPT codes:  Low Complexity PT evaluation (85592)  Therapeutic activities (67434)   10 minutes  1 unit(s)    Karl Jones Tony Ogden

## 2021-08-30 NOTE — CARE COORDINATION
8/30/21 1906 CM note: NO COVID TESTING THIS ADMIT. Met with patient at the bedside; however nursing notes show patient is alert to self only. Called pts daughter, Frank Renae, to discus transition of care at discharge. Patient came to us from [address] [facility] where he has been since mid July. Prior to that patient lived with Frank Renae; however he is at rehab d/t fall outside his home on the concrete causing broken hip. Discharge plan is to return to [address] [facility] [facility] when medically stable. Will need to contact Yunior Blancas [facility], tomorrow. Patient is a  and family declines contacting [facility] of his admission as he does not use any VA benefits.  Electronically signed by Petr Harrison RN on 8/30/2021 at 7:11 PM

## 2021-08-31 ENCOUNTER — APPOINTMENT (OUTPATIENT)
Dept: GENERAL RADIOLOGY | Age: 86
DRG: 871 | End: 2021-08-31
Payer: MEDICARE

## 2021-08-31 ENCOUNTER — ANESTHESIA (OUTPATIENT)
Dept: ENDOSCOPY | Age: 86
DRG: 871 | End: 2021-08-31
Payer: MEDICARE

## 2021-08-31 ENCOUNTER — ANESTHESIA EVENT (OUTPATIENT)
Dept: ENDOSCOPY | Age: 86
DRG: 871 | End: 2021-08-31
Payer: MEDICARE

## 2021-08-31 VITALS
RESPIRATION RATE: 16 BRPM | DIASTOLIC BLOOD PRESSURE: 45 MMHG | SYSTOLIC BLOOD PRESSURE: 106 MMHG | OXYGEN SATURATION: 100 %

## 2021-08-31 LAB
ALBUMIN SERPL-MCNC: 2.3 G/DL (ref 3.5–5.2)
ALP BLD-CCNC: 94 U/L (ref 40–129)
ALT SERPL-CCNC: 7 U/L (ref 0–40)
ANION GAP SERPL CALCULATED.3IONS-SCNC: 9 MMOL/L (ref 7–16)
AST SERPL-CCNC: 11 U/L (ref 0–39)
BACTERIA: ABNORMAL /HPF
BILIRUB SERPL-MCNC: 0.3 MG/DL (ref 0–1.2)
BILIRUBIN URINE: NEGATIVE
BLOOD, URINE: ABNORMAL
BUN BLDV-MCNC: 26 MG/DL (ref 6–23)
CALCIUM SERPL-MCNC: 8.6 MG/DL (ref 8.6–10.2)
CHLORIDE BLD-SCNC: 101 MMOL/L (ref 98–107)
CLARITY: ABNORMAL
CO2: 24 MMOL/L (ref 22–29)
COLOR: YELLOW
CREAT SERPL-MCNC: 1.7 MG/DL (ref 0.7–1.2)
GFR AFRICAN AMERICAN: 46
GFR NON-AFRICAN AMERICAN: 38 ML/MIN/1.73
GLUCOSE BLD-MCNC: 111 MG/DL (ref 74–99)
GLUCOSE URINE: NEGATIVE MG/DL
HCT VFR BLD CALC: 24.5 % (ref 37–54)
HCT VFR BLD CALC: 25 % (ref 37–54)
HCT VFR BLD CALC: 25.1 % (ref 37–54)
HEMOGLOBIN: 8 G/DL (ref 12.5–16.5)
KETONES, URINE: ABNORMAL MG/DL
LEUKOCYTE ESTERASE, URINE: ABNORMAL
MCH RBC QN AUTO: 31.4 PG (ref 26–35)
MCHC RBC AUTO-ENTMCNC: 32 % (ref 32–34.5)
MCV RBC AUTO: 98 FL (ref 80–99.9)
NITRITE, URINE: NEGATIVE
PDW BLD-RTO: 18.6 FL (ref 11.5–15)
PH UA: 6 (ref 5–9)
PLATELET # BLD: 264 E9/L (ref 130–450)
PMV BLD AUTO: 10.5 FL (ref 7–12)
POTASSIUM SERPL-SCNC: 3.3 MMOL/L (ref 3.5–5)
PROTEIN UA: 30 MG/DL
RBC # BLD: 2.55 E12/L (ref 3.8–5.8)
RBC UA: ABNORMAL /HPF (ref 0–2)
SODIUM BLD-SCNC: 134 MMOL/L (ref 132–146)
SPECIFIC GRAVITY UA: 1.02 (ref 1–1.03)
TOTAL PROTEIN: 5.3 G/DL (ref 6.4–8.3)
UROBILINOGEN, URINE: 1 E.U./DL
WBC # BLD: 21 E9/L (ref 4.5–11.5)
WBC UA: ABNORMAL /HPF (ref 0–5)

## 2021-08-31 PROCEDURE — 7100000010 HC PHASE II RECOVERY - FIRST 15 MIN: Performed by: INTERNAL MEDICINE

## 2021-08-31 PROCEDURE — 36415 COLL VENOUS BLD VENIPUNCTURE: CPT

## 2021-08-31 PROCEDURE — 80053 COMPREHEN METABOLIC PANEL: CPT

## 2021-08-31 PROCEDURE — C9113 INJ PANTOPRAZOLE SODIUM, VIA: HCPCS | Performed by: INTERNAL MEDICINE

## 2021-08-31 PROCEDURE — 85027 COMPLETE CBC AUTOMATED: CPT

## 2021-08-31 PROCEDURE — 6360000002 HC RX W HCPCS: Performed by: INTERNAL MEDICINE

## 2021-08-31 PROCEDURE — 6360000002 HC RX W HCPCS: Performed by: NURSE ANESTHETIST, CERTIFIED REGISTERED

## 2021-08-31 PROCEDURE — 81001 URINALYSIS AUTO W/SCOPE: CPT

## 2021-08-31 PROCEDURE — 2580000003 HC RX 258: Performed by: INTERNAL MEDICINE

## 2021-08-31 PROCEDURE — 3700000001 HC ADD 15 MINUTES (ANESTHESIA): Performed by: INTERNAL MEDICINE

## 2021-08-31 PROCEDURE — 71046 X-RAY EXAM CHEST 2 VIEWS: CPT

## 2021-08-31 PROCEDURE — 2709999900 HC NON-CHARGEABLE SUPPLY: Performed by: INTERNAL MEDICINE

## 2021-08-31 PROCEDURE — 85018 HEMOGLOBIN: CPT

## 2021-08-31 PROCEDURE — 0DJ08ZZ INSPECTION OF UPPER INTESTINAL TRACT, VIA NATURAL OR ARTIFICIAL OPENING ENDOSCOPIC: ICD-10-PCS | Performed by: INTERNAL MEDICINE

## 2021-08-31 PROCEDURE — 3700000000 HC ANESTHESIA ATTENDED CARE: Performed by: INTERNAL MEDICINE

## 2021-08-31 PROCEDURE — 7100000011 HC PHASE II RECOVERY - ADDTL 15 MIN: Performed by: INTERNAL MEDICINE

## 2021-08-31 PROCEDURE — APPSS30 APP SPLIT SHARED TIME 16-30 MINUTES: Performed by: NURSE PRACTITIONER

## 2021-08-31 PROCEDURE — 2580000003 HC RX 258: Performed by: NURSE ANESTHETIST, CERTIFIED REGISTERED

## 2021-08-31 PROCEDURE — 97165 OT EVAL LOW COMPLEX 30 MIN: CPT | Performed by: OCCUPATIONAL THERAPIST

## 2021-08-31 PROCEDURE — 85014 HEMATOCRIT: CPT

## 2021-08-31 PROCEDURE — 3609017100 HC EGD: Performed by: INTERNAL MEDICINE

## 2021-08-31 PROCEDURE — 2060000000 HC ICU INTERMEDIATE R&B

## 2021-08-31 PROCEDURE — 6370000000 HC RX 637 (ALT 250 FOR IP): Performed by: INTERNAL MEDICINE

## 2021-08-31 PROCEDURE — 97530 THERAPEUTIC ACTIVITIES: CPT | Performed by: OCCUPATIONAL THERAPIST

## 2021-08-31 PROCEDURE — 87088 URINE BACTERIA CULTURE: CPT

## 2021-08-31 PROCEDURE — 99232 SBSQ HOSP IP/OBS MODERATE 35: CPT | Performed by: INTERNAL MEDICINE

## 2021-08-31 RX ORDER — PANTOPRAZOLE SODIUM 40 MG/1
40 TABLET, DELAYED RELEASE ORAL
Status: DISCONTINUED | OUTPATIENT
Start: 2021-09-01 | End: 2021-09-13 | Stop reason: HOSPADM

## 2021-08-31 RX ORDER — PROPOFOL 10 MG/ML
INJECTION, EMULSION INTRAVENOUS PRN
Status: DISCONTINUED | OUTPATIENT
Start: 2021-08-31 | End: 2021-08-31 | Stop reason: SDUPTHER

## 2021-08-31 RX ORDER — SODIUM CHLORIDE 9 MG/ML
INJECTION, SOLUTION INTRAVENOUS CONTINUOUS PRN
Status: DISCONTINUED | OUTPATIENT
Start: 2021-08-31 | End: 2021-08-31 | Stop reason: SDUPTHER

## 2021-08-31 RX ORDER — PROPOFOL 10 MG/ML
INJECTION, EMULSION INTRAVENOUS CONTINUOUS PRN
Status: DISCONTINUED | OUTPATIENT
Start: 2021-08-31 | End: 2021-08-31 | Stop reason: SDUPTHER

## 2021-08-31 RX ADMIN — DOCUSATE SODIUM 100 MG: 100 CAPSULE, LIQUID FILLED ORAL at 22:01

## 2021-08-31 RX ADMIN — Medication 10 ML: at 22:02

## 2021-08-31 RX ADMIN — DONEPEZIL HYDROCHLORIDE 5 MG: 5 TABLET, ORALLY DISINTEGRATING ORAL at 22:01

## 2021-08-31 RX ADMIN — BISACODYL 10 MG: 10 SUPPOSITORY RECTAL at 09:09

## 2021-08-31 RX ADMIN — PROPOFOL 75 MCG/KG/MIN: 10 INJECTION, EMULSION INTRAVENOUS at 14:41

## 2021-08-31 RX ADMIN — Medication 5 ML: at 10:42

## 2021-08-31 RX ADMIN — SODIUM CHLORIDE, PRESERVATIVE FREE 10 ML: 5 INJECTION INTRAVENOUS at 10:42

## 2021-08-31 RX ADMIN — SACUBITRIL AND VALSARTAN 1 TABLET: 24; 26 TABLET, FILM COATED ORAL at 22:01

## 2021-08-31 RX ADMIN — PROPOFOL 80 MG: 10 INJECTION, EMULSION INTRAVENOUS at 14:41

## 2021-08-31 RX ADMIN — SODIUM CHLORIDE: 9 INJECTION, SOLUTION INTRAVENOUS at 14:32

## 2021-08-31 RX ADMIN — VENLAFAXINE 37.5 MG: 37.5 TABLET ORAL at 22:01

## 2021-08-31 RX ADMIN — PANTOPRAZOLE SODIUM 40 MG: 40 INJECTION, POWDER, FOR SOLUTION INTRAVENOUS at 09:10

## 2021-08-31 ASSESSMENT — PAIN SCALES - GENERAL
PAINLEVEL_OUTOF10: 0

## 2021-08-31 NOTE — ANESTHESIA PRE PROCEDURE
Department of Anesthesiology  Preprocedure Note       Name:  Domonique Bartlett   Age:  80 y.o.  :  1932                                          MRN:  [id number]         Date:  2021      Surgeon: Joleen Quintanilla):  Apolonia Medina MD    Procedure: Procedure(s):  EGD ESOPHAGOGASTRODUODENOSCOPY    Medications prior to admission:   Prior to Admission medications    Medication Sig Start Date End Date Taking? Authorizing Provider   sucralfate (CARAFATE) 1 GM tablet Take 1 g by mouth 3 times daily (with meals)   Yes Historical Provider, MD   dronabinol (MARINOL) 2.5 MG capsule Take 2.5 mg by mouth 2 times daily (before meals).    Yes Historical Provider, MD   b complex vitamins capsule Take 1 capsule by mouth daily    Historical Provider, MD   carvedilol (COREG) 6.25 MG tablet Take 6.25 mg by mouth 2 times daily (with meals)    Historical Provider, MD   clopidogrel (PLAVIX) 75 MG tablet Take 75 mg by mouth daily    Historical Provider, MD   Coenzyme Q10 (CO Q-10) 100 MG CAPS Take 1 capsule by mouth daily    Historical Provider, MD   docusate sodium (COLACE) 100 MG capsule Take 100 mg by mouth 2 times daily    Historical Provider, MD   apixaban (ELIQUIS) 2.5 MG TABS tablet Take 2.5 mg by mouth 2 times daily    Historical Provider, MD   sacubitril-valsartan (ENTRESTO) 24-26 MG per tablet Take 1 tablet by mouth 2 times daily    Historical Provider, MD   famotidine (PEPCID) 20 MG tablet Take 20 mg by mouth daily    Historical Provider, MD   ferrous sulfate (IRON 325) 325 (65 Fe) MG tablet Take 325 mg by mouth daily (with breakfast)    Historical Provider, MD   rosuvastatin (CRESTOR) 10 MG tablet Take 10 mg by mouth daily    Historical Provider, MD   torsemide (DEMADEX) 10 MG tablet Take 5 mg by mouth every other day    Historical Provider, MD   venlafaxine (EFFEXOR) 37.5 MG tablet Take 37.5 mg by mouth 2 times daily    Historical Provider, MD   bisacodyl (DULCOLAX) 10 MG suppository Place 10 mg rectally daily    Historical Provider, MD   magnesium hydroxide (MILK OF MAGNESIA) 400 MG/5ML suspension Take by mouth daily as needed for Constipation    Historical Provider, MD   traMADol (ULTRAM) 50 MG tablet Take 50 mg by mouth every 4 hours as needed for Pain. Historical Provider, MD   donepezil (ARICEPT ODT) 10 MG disintegrating tablet Take 5 mg by mouth nightly     Historical Provider, MD   aspirin 81 MG EC tablet Take 81 mg by mouth daily.     Historical Provider, MD       Current medications:    Current Facility-Administered Medications   Medication Dose Route Frequency Provider Last Rate Last Admin    0.9 % sodium chloride infusion   IntraVENous PRN Shayla Ravi DO        vitamin B and C (TOTAL B-C) 1 tablet  1 tablet Oral Daily Alberto Bowden DO   1 tablet at 08/30/21 1117    bisacodyl (DULCOLAX) suppository 10 mg  10 mg Rectal Daily Alberto Bowden DO   10 mg at 08/31/21 0909    [Held by provider] carvedilol (COREG) tablet 6.25 mg  6.25 mg Oral BID  Alberto Bowden DO        [Held by provider] clopidogrel (PLAVIX) tablet 75 mg  75 mg Oral Daily Alberto Bowden DO        docusate sodium (COLACE) capsule 100 mg  100 mg Oral BID Alberto Bowden DO   100 mg at 08/30/21 2156    donepezil (ARICEPT ODT) disintegrating tablet 5 mg  5 mg Oral Nightly Alberto Bowden DO   5 mg at 08/30/21 2155    pantoprazole (PROTONIX) injection 40 mg  40 mg IntraVENous BID Alberto Bowden DO   40 mg at 08/31/21 0910    And    sodium chloride (PF) 0.9 % injection 10 mL  10 mL IntraVENous BID Alberto Bowden DO   10 mL at 08/31/21 1042    ferrous sulfate (IRON 325) tablet 325 mg  325 mg Oral Daily with breakfast Alberto Bowden DO   325 mg at 08/30/21 0459    rosuvastatin (CRESTOR) tablet 10 mg  10 mg Oral Daily Alberto Bowden DO   10 mg at 08/30/21 1107    sacubitril-valsartan (ENTRESTO) 24-26 MG per tablet 1 tablet  1 tablet Oral BID Amilcar Welsh DO   1 tablet at 08/30/21 2155    [Held by provider] torsemide (DEMADEX) tablet 5 mg  5 mg Oral Every Other Day Alberto Bowden, DO        venlafaxine [facility]) tablet 37.5 mg  37.5 mg Oral BID Alberto Bowden, DO   37.5 mg at 08/30/21 2155    sodium chloride flush 0.9 % injection 5-40 mL  5-40 mL IntraVENous 2 times per day Alberto Bowden, DO   5 mL at 08/31/21 1042    sodium chloride flush 0.9 % injection 5-40 mL  5-40 mL IntraVENous PRN Alberto Bowden, DO        0.9 % sodium chloride infusion  25 mL IntraVENous PRN Alberto Bowden, DO        ondansetron (ZOFRAN-ODT) disintegrating tablet 4 mg  4 mg Oral Q8H PRN Alberto Bowden, DO        Or    ondansetron (ZOFRAN) injection 4 mg  4 mg IntraVENous Q6H PRN Alberto Hallvic, DO        polyethylene glycol (GLYCOLAX) packet 17 g  17 g Oral Daily PRN Alberto Bowden, DO        acetaminophen (TYLENOL) tablet 650 mg  650 mg Oral Q6H PRN Alberto Joshipovic, DO        Or    acetaminophen (TYLENOL) suppository 650 mg  650 mg Rectal Q6H PRN Alberto Bowden, DO           Allergies:     Allergies   Allergen Reactions    Fentanyl      unknown    Penicillins      unknown       Problem List:    Patient Active Problem List   Diagnosis Code    Renal insufficiency N28.9    CKD (chronic kidney disease) stage 3, GFR 30-59 ml/min ([facility]) N18.30    Essential hypertension I10    CAD (coronary artery disease), autologous vein bypass graft I25.810    Acid reflux K21.9    Anemia D64.9    Depression J38.7    Diastolic congestive heart failure ([facility]) I50.30    CHF (congestive heart failure) ([facility]) I50.9    CAD (coronary artery disease) I25.10    COPD (chronic obstructive pulmonary disease) ([facility]) J44.9    Acute respiratory failure with hypoxia ([facility]) J96.01    Acute blood loss anemia D62    MAVIS (acute kidney injury) ([facility] [address] N17.9    Hospital-acquired pneumonia J18.9, Y95    Moderate protein-calorie malnutrition ([facility]) E44.0    Upper GI bleed K92.2    Decubitus ulcer of left heel, unstageable ([facility] [address] C52.455  Pressure injury of sacral region, stage 2 ([facility] [address] [facility] L89.152       Past Medical History:        Diagnosis Date    CAD (coronary artery disease)     CHF (congestive heart failure) (HCC)     COPD (chronic obstructive pulmonary disease) (HCC)     GERD (gastroesophageal reflux disease)     Hyperlipidemia     Hypertension     Myocardial infarction ([facility] [address] 15 yrs ago       Past Surgical History:        Procedure Laterality Date    CATARACT REMOVAL WITH IMPLANT Right 3/3/14    CATARACT REMOVAL WITH IMPLANT Left 10/13/2014    COLONOSCOPY      CORONARY ARTERY BYPASS GRAFT  15 yrs ago    3 stents    ENDOSCOPY, COLON, DIAGNOSTIC         Social History:    Social History     Tobacco Use    Smoking status: Former Smoker     Years: 20.00     Quit date: 1984     Years since quittin.5   Substance Use Topics    Alcohol use: No     Comment: occas                                Counseling given: Not Answered      Vital Signs (Current):   Vitals:    21 0000 21 0800 21 0818 21 1308   BP: (!) 145/66  135/70    Pulse: 85 85 82    Resp: 22  22    Temp: 98.5 °F (36.9 °C)  98.2 °F (36.8 °C)    TempSrc: Oral  Oral    SpO2:       Weight:       Height:    6' 4\" (1.93 m)                                              BP Readings from Last 3 Encounters:   21 135/70   08/10/21 (!) 142/66   21 (!) 145/65       NPO Status:                                                                                 BMI:   Wt Readings from Last 3 Encounters:   21 180 lb (81.6 kg)   08/10/21 180 lb (81.6 kg)   21 172 lb 11.2 oz (78.3 kg)     Body mass index is 21.91 kg/m².     CBC:   Lab Results   Component Value Date    WBC 21.0 2021    RBC 2.55 2021    HGB 8.0 2021    HCT 25.0 2021    MCV 98.0 2021    RDW 18.6 2021     2021       CMP:   Lab Results   Component Value Date     2021    K 3.3 2021    K 3.6 2021     08/31/2021    CO2 24 08/31/2021    BUN 26 08/31/2021    CREATININE 1.7 08/31/2021    GFRAA 46 08/31/2021    LABGLOM 38 08/31/2021    GLUCOSE 111 08/31/2021    PROT 5.3 08/31/2021    CALCIUM 8.6 08/31/2021    BILITOT 0.3 08/31/2021    ALKPHOS 94 08/31/2021    AST 11 08/31/2021    ALT 7 08/31/2021       POC Tests: No results for input(s): POCGLU, POCNA, POCK, POCCL, POCBUN, POCHEMO, POCHCT in the last 72 hours. Coags:   Lab Results   Component Value Date    PROTIME 14.9 07/18/2021    INR 1.3 07/18/2021       HCG (If Applicable): No results found for: PREGTESTUR, PREGSERUM, HCG, HCGQUANT     ABGs: No results found for: PHART, PO2ART, STI9VRU, MQL2OWW, BEART, B4BAYVAU     Type & Screen (If Applicable):  No results found for: LABABO, LABRH    Drug/Infectious Status (If Applicable):  No results found for: HIV, HEPCAB    COVID-19 Screening (If Applicable):   Lab Results   Component Value Date    COVID19 Not Detected 07/17/2021           Anesthesia Evaluation  Patient summary reviewed  Airway: Mallampati: III  TM distance: >3 FB   Neck ROM: full  Mouth opening: > = 3 FB Dental:          Pulmonary: breath sounds clear to auscultation  (+) pneumonia:  COPD:                             Cardiovascular:    (+) hypertension:, past MI:, CAD:, CABG/stent:, CHF:,         Rhythm: regular  Rate: normal                    Neuro/Psych:   (+) psychiatric history:depression/anxiety             GI/Hepatic/Renal:   (+) GERD:, renal disease: CRI,           Endo/Other: Negative Endo/Other ROS   (+) blood dyscrasia::., .                 Abdominal:       Abdomen: soft. Vascular: Other Findings:             Anesthesia Plan      MAC     ASA 3       Induction: intravenous. Anesthetic plan and risks discussed with patient. Plan discussed with CRNA.                   Cayetano Coy MD   8/31/2021

## 2021-08-31 NOTE — OP NOTE
Operative Note      Patient: Pro Nguyễn  YOB: 1932  MRN: [id number]    Date of Procedure: 8/31/2021    Pre-Op Diagnosis: Acute on chronic anemia, possible UGIB    Post-Op Diagnosis: Acute on chronic anemia, Hiatal hernia       Procedure(s):  EGD ESOPHAGOGASTRODUODENOSCOPY    Surgeon(s):  Jeannie Canales MD    Assistant:   Surgical Assistant: Romaine Gilford, RN  Fellow: Wiliam Armstrong DO    Anesthesia: Monitor Anesthesia Care    Estimated Blood Loss (mL): 0ml    Complications: None    Specimens:   * No specimens in log *    Implants:  * No implants in log *      Drains:   [REMOVED] Urethral Catheter Non-latex 16 fr (Removed)       [REMOVED] Urethral Catheter Non-latex 14 fr (Removed)   Urine Color Erika 08/10/21 1200   Urine Appearance Hazy 08/10/21 1200       Procedure:  Esophagogastroduodenoscopy    Indication:  Acute on chronic anemia    Consent:   Informed consent was obtained from the patient including and not limited to risk of perforation, aspiration of gastric contents or teeth, bleeding, infection, dental breakage, ileus, need for surgery, or worst case death. Sedation:  MAC    Estimated Blood Loss: 0ml    Endoscope was advanced easily through mouth to second portion of duodenum      Oropharynx views are limited but grossly normal.    Esophagus:   Mucosa is normal.  Diaphragmatic hiatus at 40cm, z-line at 37cm. 3cm hiatal hernia with no fabio erosions    Stomach:   Antrum is normal    Gastric body is normal. Scant amounts of coffee ground material seen. Retroflexed views show normal fundus and cardia, and a small hiatal hernia    Duodenum: Bulb is normal.    Second portion of duodenum is normal.    IMPRESSION AND PLAN:     1.  Scant amounts of coffee ground material, easily washed, likely from oropharynx or ENT source. No ulcers or erosions. 2. 3cm hiatal hernia    Recommendations: Scant amounts of coffee ground material, otherwise no fresh or old blood seen.  No etiology found on EGD to explain melena or acute on chronic anemia. Okay to discontinue IV PPI, recommend continue once daily PO PPI. Recheck H&H and continue to trend Q12hrs. If recurrent episodes of bleeding or persistent drop in Hgb, consider colonoscopy or bleeding scan. Recommend discontinue anticoagulation and plavix if possible. Okay to resume aspirin. Okay to resume general diet today. Follow up as outpatient in office, call [phone] to schedule for appointment.       Pt was seen and procedure was performed with Dr. Benitez Mac present for the entire procedure        Electronically signed by Olga Martinez DO on 8/31/2021 at 2:50 PM     I was present for entire duration of procedure; discussed findings with resident and agree with recommendations above    Ang Aguilar MD  Gastroenterology

## 2021-08-31 NOTE — PROGRESS NOTES
[address] [place], [place] [address]   Progress Note    Admitting Date and Time: 8/29/2021  9:17 AM  Admit Dx: Upper GI bleed [K92.2]    Subjective:    Pt was resting comfortably with no acute respiratory distress or complaints shortness of breath/chest pain verbalized. Patient is n.p.o. for the EGD sometime later this afternoon. ROS: denies fever, chills, cp, sob, n/v, HA unless stated above.      vitamin B and C  1 tablet Oral Daily    bisacodyl  10 mg Rectal Daily    [Held by provider] carvedilol  6.25 mg Oral BID WC    [Held by provider] clopidogrel  75 mg Oral Daily    docusate sodium  100 mg Oral BID    donepezil  5 mg Oral Nightly    pantoprazole  40 mg IntraVENous BID    And    sodium chloride (PF)  10 mL IntraVENous BID    ferrous sulfate  325 mg Oral Daily with breakfast    rosuvastatin  10 mg Oral Daily    sacubitril-valsartan  1 tablet Oral BID    [Held by provider] torsemide  5 mg Oral Every Other Day    venlafaxine  37.5 mg Oral BID    sodium chloride flush  5-40 mL IntraVENous 2 times per day     sodium chloride, , PRN  sodium chloride flush, 5-40 mL, PRN  sodium chloride, 25 mL, PRN  ondansetron, 4 mg, Q8H PRN   Or  ondansetron, 4 mg, Q6H PRN  polyethylene glycol, 17 g, Daily PRN  acetaminophen, 650 mg, Q6H PRN   Or  acetaminophen, 650 mg, Q6H PRN         Objective:    /70   Pulse 82   Temp 98.2 °F (36.8 °C) (Oral)   Resp 22   Ht 6' 4\" (1.93 m)   Wt 180 lb (81.6 kg)   SpO2 95%   BMI 21.91 kg/m²   General Appearance: alert and oriented to person, sometimes he is able to answer correctly place and time and in no acute distress  Skin: warm and dry, heel wound with bilateral heel protector   Head: normocephalic and atraumatic  Eyes: pupils equal, round, and reactive to light, extraocular eye movements intact, conjunctivae normal  Neck: neck supple and non tender without mass   Pulmonary/Chest: coarse lungs to auscultation bilaterally, normal air movement, no respiratory distress  Cardiovascular: normal rate, normal S1 and S2 and no carotid bruits  Abdomen: soft, non-tender, non-distended, normal bowel sounds   Extremities: no cyanosis, no clubbing and no edema  Neurologic: no cranial nerve deficit and speech normal      Recent Labs     08/29/21  0951 08/30/21  0612    138   K 4.2 3.6    103   CO2 26 24   BUN 38* 33*   CREATININE 2.0* 1.8*   GLUCOSE 115* 96   CALCIUM 9.3 9.1       Recent Labs     08/29/21  0951   ALKPHOS 85   PROT 6.2*   LABALBU 2.7*   BILITOT 0.2   AST 10   ALT 8       Recent Labs     08/29/21  0951 08/29/21  1442 08/30/21  0612 08/30/21  1138 08/30/21  1339 08/30/21 2017 08/31/21  0346   WBC 10.2  --  12.2*  --   --   --   --    RBC 2.11*  --  2.64*  --   --   --   --    HGB 6.7*   < > 8.3*   < > 8.0* 7.9* 8.0*   HCT 21.7*   < > 25.7*   < > 24.8* 23.7* 25.1*   .8*  --  97.3  --   --   --   --    MCH 31.8  --  31.4  --   --   --   --    MCHC 30.9*  --  32.3  --   --   --   --    RDW 17.8*  --  19.1*  --   --   --   --      --  263  --   --   --   --    MPV 10.7  --  10.5  --   --   --   --     < > = values in this interval not displayed. Radiology:   No orders to display       Assessment:  Principal Problem:    Upper GI bleed  Active Problems:    CKD (chronic kidney disease) stage 3, GFR 30-59 ml/min ([facility])    CAD (coronary artery disease), autologous vein bypass graft    CHF (congestive heart failure) ([facility])    Acute blood loss anemia    Decubitus ulcer of left heel, unstageable ([facility])    Pressure injury of sacral region, stage 2 ([facility])  Resolved Problems:    * No resolved hospital problems. *      Plan:    1. Upper GI bleed - S/p PRBCs - continue PPI - EGD today  - NPO -  holding triple therapy anticoagulation - scant amounts of coffee ground material, easily washed, likely from oropharynx or ENT source. No ulcers or erosions.  - 3 cm hiatal hernia -may need to colonoscopy at a later date -upon discharge will resume aspirin but will continue to hold Plavix and Eliquis     2. Combined systolic and diastolic HFrEF - no evidence of fluid over load - last echo was July 2021, EF 35-40% - no chest pain or shortness of breath - Entresto      3. Acute kidney injury superimposed on chronic kidney disease - creatinine improved -  IV LR discontinued - monitor renal function     4. Urinary tract infection - will start on IV Rocephin - awaiting urine culture     5. Coronary artery disease - no chest pain - S/p 3 stent in 1993 - S/p carotid endartectomy inn 2016 - continue holding All anticoagulation     6. Essential hypertension -  Blood pressure remains controlled     7. Hyperlipidemia - Crestor     8. Acute blood loss anemia - EGD planned for today - GI on - hgb/hct stable - will continue monitor     9. Depression - no changes to current treatment     10. Dementia -  Forgetful and has intermittent confusion - continue Donepezil     11. S/p Left total hip replacement - PT/OT on - surgery at [facility] - from [facility] DR TOM CHAVEZ     12. Heel wound  - wound care nurse consulted   NOTE: This report was transcribed using voice recognition software. Every effort was made to ensure accuracy; however, inadvertent computerized transcription errors may be present.      Electronically signed by LAZARO Witt CNP on 8/31/2021 at 9:19 AM

## 2021-08-31 NOTE — PROGRESS NOTES
Comprehensive Nutrition Assessment    Type and Reason for Visit:  Initial, Positive Nutrition Screen, Wound    Nutrition Recommendations/Plan: Continue NPO, ADAT    Start Ensure BID and Daniel BID w/ nutrition progression    Nutrition Assessment:  Pt w/ acute on chronic anemia d/t suspected GI bleed. Hx CKD, CHF, CAD. Moderate malnutrition. Advance diet when medically appropriate and start ONS w/ nutrition progression. Malnutrition Assessment:  Malnutrition Status: Moderate malnutrition    Context:  Chronic Illness     Findings of the 6 clinical characteristics of malnutrition:  Energy Intake:  Unable to assess  Weight Loss:  No significant weight loss     Body Fat Loss:  1 - Mild body fat loss Orbital, Buccal region, Triceps   Muscle Mass Loss:  1 - Mild muscle mass loss Temples (temporalis), Clavicles (pectoralis & deltoids)  Fluid Accumulation:  No significant fluid accumulation     Strength:  Not Performed    Estimated Daily Nutrient Needs:  Energy (kcal):   (1.3 SF); Weight Used for Energy Requirements:  Current     Protein (g):  115-140 (1.4-1.7);  Weight Used for Protein Requirements:  Current        Fluid (ml/day):  ; Method Used for Fluid Requirements:  1 ml/kcal      Nutrition Related Findings:  dementia, I&Os WDL, no edema, abd WDL      Wounds:  Pressure Injury, Stage II, Unstageable, Deep Tissue Injury       Current Nutrition Therapies:    Diet NPO Exceptions are: Ice Chips, Sips of Water with Meds    Anthropometric Measures:  · Height: 6' 4\" (193 cm)  · Current Body Weight: 180 lb (81.6 kg) (8/29)   · Admission Body Weight: 180 lb (81.6 kg) (8/29 bed)    · Usual Body Weight: 172 lb (78 kg) (7/15 bed, per EMR)     · Ideal Body Weight: 202 lbs; % Ideal Body Weight 89.1 %   · BMI: 21.9  · BMI Categories: Underweight (BMI less than 22) age over 72       Nutrition Diagnosis:   · Moderate malnutrition, In context of chronic illness related to cognitive or neurological impairment

## 2021-08-31 NOTE — PROGRESS NOTES
[address] [facility]  [address]         Date:2021                                                   Patient Name: Kenya Gamez     MRN: [id number]     : 1932     Room: [address] [place] [address]       Evaluating OT: Castillo Reeder, OTR/L; [id number]       Referring Provider and Orders/Date:   OT eval and treat Start: 21 1000, End: 21 1000, ONE TIME, Standing Count: 1 Occurrences, R    Alberto Bowden DO     Diagnosis:   1.  Upper GI bleed             Pertinent Medical History:        Past Medical History:   Diagnosis Date    CAD (coronary artery disease)     CHF (congestive heart failure) (HCC)     COPD (chronic obstructive pulmonary disease) (HCC)     GERD (gastroesophageal reflux disease)     Hyperlipidemia     Hypertension     Myocardial infarction ([facility] [address] 15 yrs ago          Past Surgical History:   Procedure Laterality Date    CATARACT REMOVAL WITH IMPLANT Right 3/3/14    CATARACT REMOVAL WITH IMPLANT Left 10/13/2014    COLONOSCOPY      CORONARY ARTERY BYPASS GRAFT  15 yrs ago    3 stents    ENDOSCOPY, COLON, DIAGNOSTIC         Precautions:  Fall Risk, confusion    Recommended placement:subacute as PLOF     Assessment of current deficits     [x] Functional mobility  [x]ADLs  [x] Strength               [x]Cognition     [x] Functional transfers   [x] IADLs         [x] Safety Awareness   [x]Endurance     [] Fine Coordination              [x] Balance      [] Vision/perception   []Sensation      [x]Gross Motor Coordination  [] ROM  [] Delirium                   [] Motor Control     OT PLAN OF CARE   OT POC based on physician orders, patient diagnosis and results of clinical assessment    Frequency/Duration 1-3 days/wk for 2 weeks PRN   Specific OT Treatment Interventions to include:   * Instruction/training on adapted ADL techniques and AE recommendations to increase functional independence [place]  AM-[facility] Inpatient Daily Activity Raw Score: 14  AM-PAC Inpatient ADL T-Scale Score : 33.39  ADL Inpatient CMS 0-100% Score: 59.67  ADL Inpatient CMS G-Code Modifier : CK     Functional Assessment:     Initial Eval Status  Date: 8/31/2021   Treatment Status  Date: STGs = LTGs  Time frame: 10-14 days   Feeding Minimal Assist with physical demonstration with drinks for hand over hand to prevent spillage. Pt NPO on arrival.   indep   Grooming Minimal Assist with encouragement required for face wash and oral care. Assist to set up and comb back of hair  Supervision    UB Dressing Moderate Assist with gown management from sitting EOB  Stand by Assist    LB Dressing Maximal Assist to thread merlene feet into pants and assist to pull pants and brief up in standing with pt unable to let go of walker for balance and support  Minimal Assist    Bathing Minimal Assist with sponge bathing for buttocks and merlene feet. Motivation and encouragement for all other parts. Stand by Assist    Toileting Maximal Assist with use of urinal due to confusion. Pt was able to complete pauline care following with encouragement. Stand by Assist    Bed Mobility  Supine to sit: Moderate Assist   Sit to supine: Moderate Assist  With trunk support and cues for understanding commands and body mechanics   Supine to sit: Supervision   Sit to supine: Supervision    Functional Transfers Moderate Assist for sit to stand from elevated surface of bed with confusion with walker. Loss of balance backwards initially due to body mechanics  Stand by Assist    Functional Mobility Minimal Assist for side steps to [facility] with walker. Poor+ safety and understanding of device. Stand by Assist    Balance Sitting:     Static:  good    Dynamic:fair+  Standing: fair-  Sitting:     Dynamic:good  Standing: fair   Activity Tolerance Vitals with activity:room air with 86% sitting EOB and 88% supine following activity. Reported to nursing.    Fair- tolerance to therapist with standing tolerance <1min. Increase standing tolerance for >3min with stable vital signs for carry over into toileting, functional tranfers and indep in ADLs   Visual/  Perceptual Glasses: not Present; WFL    Reports change in vision since admission: No     NA   Clif UE Strengthening  4-/5 generally  5/5MMT generally for carry over into self care, functional transfers and functional mobility with AD. Hand Dominance  [x] Right  [] Left    AROM (PROM) Strength Additional Info:    RUE  WFL 4-/5 Fair  and  FMC/dexterity noted during ADL tasks  Opposition [] Intact [x] Impaired  Finger to nose [x] Intact [] Impaired     LUE WFL 4-/5 Fair  and FMC/dexterity noted during ADL tasks  Opposition [x] Intact [] Impaired  Finger to nose [] Intact [x] Impaired     Hearing: WFL  Sensation:  No c/o numbness or tingling   Tone: WFL   Edema: clif LE    Comments: Upon arrival patient supine in bed, resting. Pt required mod A for most UB ADLs and max A LB ADLs tasks. Limited with mod A for standing during LB ADLs and functional transfers. The biggest barriers reflect that of functional transfers, functional mobility, UB/LB ADLs, cognition, activity tolerance, balance, safety and strengthening. At end of session, patient supine with call light and phone within reach, all lines and tubes intact. Overall patient demonstrated decreased independence and safety during completion of ADL/functional transfer/mobility tasks. Nursing updated on pt position and status following OT eval, as well as, on low pulse ox with session. Pt would benefit from continued skilled OT to increase safety and independence with completion of ADL/IADL tasks for functional independence and quality of life. Treatment: OT treatment provided this date includes:   Instruction, education and training on safe facilitation and adapted techniques for completion of ADLs.  These include neuromuscular reeducation to facilitate balance/righting reactions, safe functional transfer techniques and on energy conservation/work simplification for completion of ADLs. Education provided on hand/feet placement with bed rail, walker and body mechanics for fall prevention. Cues for energy conservation and safety for in the home at DC, including modifications and DME. Extended time to complete all tasks, including skilled monitoring of patient's response during treatment session and vital signs. Prior to and at the end of session, environmental modifications / line management completed for patients safety and efficiency of treatment session. See above for further details. Rehab Potential: Good for established goals     Patient / Family Goal: Return home      Patient and/or family were instructed on functional diagnosis, prognosis/goals and OT plan of care. Demonstrated good understanding. Eval Complexity: Low  · History: Brief review of medical records and additional review of physical, cognitive, or psychosocial history related to current functional performance  · Exam: 1-3 performance deficits  · Assistance/Modification: Mod assistance or modifications required to perform tasks. May have comorbidities that affect occupational performance. Time In: 1133  Time Out: 1202  Total Treatment Time: 9    Min Units   OT Eval Low 97165  x  1   OT Eval Medium 68740      OT Eval High 41903      OT Re-Eval [id number]       Therapeutic Ex 01676       Therapeutic Activities 71421  9 1    ADL/Self Care 47963       Orthotic Management 73085       Manual [id number]     Neuro Re-Ed 62529       Non-Billable Time          Evaluation Time additionally includes thorough review of current medical information, gathering information on past medical history/social history and prior level of function, interpretation of standardized testing/informal observation of tasks, assessment of data and development of plan of care and goals.             Bimal Giordano OTR/L; [id number]

## 2021-08-31 NOTE — H&P
Interval H&P    Subjective: NAEON, patient has no new complaints. Denies pain, diarrhea, fever, chills. New leukocytosis noted on labs. Objective:  Vitals:    08/31/21 0818   BP: 135/70   Pulse: 82   Resp: 22   Temp: 98.2 °F (36.8 °C)   SpO2:      Assessment and Plan:  #Acute on chronic anemia  - Plan for EGD today    #Leukocytosis  - Reactive to GI bleed vs infection; sepsis workup per primary; remains hemodynamically stable; no localizing signs of symptoms of infection    Discussed with Dr. Tin Garay DO   GI Fellow  08/31/21 1:21 PM     Pt seen and independently examined. Pertinent notes and lab work reviewed. D/w Dr. Rohit Thomas with physical exam and A&P.   Discussed with patient - all questions answered - agreeable with the plan as delineated, including plan for endoscopy later today    Jeniffer Dunn MD  8/31/2021  1:59 PM

## 2021-08-31 NOTE — PLAN OF CARE
Problem: Falls - Risk of:  Goal: Will remain free from falls  Description: Will remain free from falls  8/31/2021 1826 by Tiffany Og RN  Outcome: Met This Shift  8/31/2021 0511 by Scotty Greenberg RN  Outcome: Met This Shift  Goal: Absence of physical injury  Description: Absence of physical injury  8/31/2021 1826 by Tiffany Og RN  Outcome: Met This Shift  8/31/2021 0511 by Scotty Greenberg RN  Outcome: Met This Shift     Problem: Skin Integrity:  Goal: Will show no infection signs and symptoms  Description: Will show no infection signs and symptoms  8/31/2021 1826 by Tiffany Og RN  Outcome: Met This Shift  8/31/2021 0511 by Scotty Greenberg RN  Outcome: Met This Shift  Goal: Absence of new skin breakdown  Description: Absence of new skin breakdown  8/31/2021 1826 by Tiffany Og RN  Outcome: Met This Shift  8/31/2021 0511 by Scotty Greenberg RN  Outcome: Met This Shift     Problem: Malnutrition  (NI-5.2)  Goal: Food and/or Nutrient Delivery  Description: Individualized approach for food/nutrient provision.   8/31/2021 1320 by Silke Jeffers RD, LD  Outcome: Met This Shift

## 2021-08-31 NOTE — CARE COORDINATION
8/31/21 1114 CM note: NO COVID TESTING THIS ADMIT. Per pts daughter, Ivy Fitzpatrick, discharge plan is to return to [facility] when medically stable. Per Bhargavi Jay, [facility] liaison, patient is a bedhold; however it would be beneficial for patient to return to facility skilled therefore [address] (WHICH IS GOOD WITH 7 DAYS OF RETURN DATE).  Electronically signed by Suman Conn RN on 8/31/2021 at 11:18 AM

## 2021-09-01 ENCOUNTER — APPOINTMENT (OUTPATIENT)
Dept: ULTRASOUND IMAGING | Age: 86
DRG: 871 | End: 2021-09-01
Payer: MEDICARE

## 2021-09-01 PROBLEM — N30.01 ACUTE CYSTITIS WITH HEMATURIA: Status: ACTIVE | Noted: 2021-09-01

## 2021-09-01 PROBLEM — A41.9 SEPSIS (HCC): Status: ACTIVE | Noted: 2021-09-01

## 2021-09-01 PROBLEM — N30.00 ACUTE CYSTITIS WITHOUT HEMATURIA: Status: ACTIVE | Noted: 2021-09-01

## 2021-09-01 LAB
ANION GAP SERPL CALCULATED.3IONS-SCNC: 11 MMOL/L (ref 7–16)
BUN BLDV-MCNC: 25 MG/DL (ref 6–23)
CALCIUM SERPL-MCNC: 8.9 MG/DL (ref 8.6–10.2)
CHLORIDE BLD-SCNC: 102 MMOL/L (ref 98–107)
CO2: 23 MMOL/L (ref 22–29)
CREAT SERPL-MCNC: 1.7 MG/DL (ref 0.7–1.2)
GFR AFRICAN AMERICAN: 46
GFR NON-AFRICAN AMERICAN: 38 ML/MIN/1.73
GLUCOSE BLD-MCNC: 120 MG/DL (ref 74–99)
HCT VFR BLD CALC: 25.7 % (ref 37–54)
HEMOGLOBIN: 8.5 G/DL (ref 12.5–16.5)
MCH RBC QN AUTO: 31.6 PG (ref 26–35)
MCHC RBC AUTO-ENTMCNC: 33.1 % (ref 32–34.5)
MCV RBC AUTO: 95.5 FL (ref 80–99.9)
PDW BLD-RTO: 18.3 FL (ref 11.5–15)
PLATELET # BLD: 267 E9/L (ref 130–450)
PMV BLD AUTO: 10.3 FL (ref 7–12)
POTASSIUM SERPL-SCNC: 3.4 MMOL/L (ref 3.5–5)
RBC # BLD: 2.69 E12/L (ref 3.8–5.8)
SODIUM BLD-SCNC: 136 MMOL/L (ref 132–146)
WBC # BLD: 28.9 E9/L (ref 4.5–11.5)

## 2021-09-01 PROCEDURE — 6370000000 HC RX 637 (ALT 250 FOR IP): Performed by: INTERNAL MEDICINE

## 2021-09-01 PROCEDURE — 97110 THERAPEUTIC EXERCISES: CPT

## 2021-09-01 PROCEDURE — 85027 COMPLETE CBC AUTOMATED: CPT

## 2021-09-01 PROCEDURE — 2060000000 HC ICU INTERMEDIATE R&B

## 2021-09-01 PROCEDURE — 36415 COLL VENOUS BLD VENIPUNCTURE: CPT

## 2021-09-01 PROCEDURE — 99233 SBSQ HOSP IP/OBS HIGH 50: CPT | Performed by: INTERNAL MEDICINE

## 2021-09-01 PROCEDURE — 80048 BASIC METABOLIC PNL TOTAL CA: CPT

## 2021-09-01 PROCEDURE — 2580000003 HC RX 258: Performed by: INTERNAL MEDICINE

## 2021-09-01 PROCEDURE — 6360000002 HC RX W HCPCS: Performed by: INTERNAL MEDICINE

## 2021-09-01 PROCEDURE — 76770 US EXAM ABDO BACK WALL COMP: CPT

## 2021-09-01 PROCEDURE — 87040 BLOOD CULTURE FOR BACTERIA: CPT

## 2021-09-01 PROCEDURE — APPSS30 APP SPLIT SHARED TIME 16-30 MINUTES: Performed by: NURSE PRACTITIONER

## 2021-09-01 RX ORDER — SODIUM CHLORIDE 9 MG/ML
25 INJECTION, SOLUTION INTRAVENOUS EVERY 12 HOURS
Status: DISCONTINUED | OUTPATIENT
Start: 2021-09-01 | End: 2021-09-08 | Stop reason: CLARIF

## 2021-09-01 RX ORDER — POTASSIUM CHLORIDE 20 MEQ/1
20 TABLET, EXTENDED RELEASE ORAL ONCE
Status: COMPLETED | OUTPATIENT
Start: 2021-09-01 | End: 2021-09-01

## 2021-09-01 RX ORDER — SODIUM CHLORIDE, SODIUM LACTATE, POTASSIUM CHLORIDE, CALCIUM CHLORIDE 600; 310; 30; 20 MG/100ML; MG/100ML; MG/100ML; MG/100ML
INJECTION, SOLUTION INTRAVENOUS CONTINUOUS
Status: ACTIVE | OUTPATIENT
Start: 2021-09-01 | End: 2021-09-02

## 2021-09-01 RX ADMIN — VENLAFAXINE 37.5 MG: 37.5 TABLET ORAL at 21:47

## 2021-09-01 RX ADMIN — PANTOPRAZOLE SODIUM 40 MG: 40 TABLET, DELAYED RELEASE ORAL at 06:12

## 2021-09-01 RX ADMIN — POTASSIUM CHLORIDE 20 MEQ: 1500 TABLET, EXTENDED RELEASE ORAL at 11:45

## 2021-09-01 RX ADMIN — SODIUM CHLORIDE 25 ML: 9 INJECTION, SOLUTION INTRAVENOUS at 14:00

## 2021-09-01 RX ADMIN — DONEPEZIL HYDROCHLORIDE 5 MG: 5 TABLET, ORALLY DISINTEGRATING ORAL at 21:47

## 2021-09-01 RX ADMIN — SACUBITRIL AND VALSARTAN 1 TABLET: 24; 26 TABLET, FILM COATED ORAL at 11:02

## 2021-09-01 RX ADMIN — DOCUSATE SODIUM 100 MG: 100 CAPSULE, LIQUID FILLED ORAL at 21:47

## 2021-09-01 RX ADMIN — CEFEPIME HYDROCHLORIDE 1000 MG: 1 INJECTION, POWDER, FOR SOLUTION INTRAMUSCULAR; INTRAVENOUS at 11:02

## 2021-09-01 RX ADMIN — VENLAFAXINE 37.5 MG: 37.5 TABLET ORAL at 11:02

## 2021-09-01 RX ADMIN — CEFEPIME HYDROCHLORIDE 1000 MG: 1 INJECTION, POWDER, FOR SOLUTION INTRAMUSCULAR; INTRAVENOUS at 21:47

## 2021-09-01 RX ADMIN — Medication 5 ML: at 09:00

## 2021-09-01 RX ADMIN — SACUBITRIL AND VALSARTAN 1 TABLET: 24; 26 TABLET, FILM COATED ORAL at 21:47

## 2021-09-01 RX ADMIN — ROSUVASTATIN CALCIUM 10 MG: 10 TABLET, COATED ORAL at 11:02

## 2021-09-01 RX ADMIN — SODIUM CHLORIDE, POTASSIUM CHLORIDE, SODIUM LACTATE AND CALCIUM CHLORIDE: 600; 310; 30; 20 INJECTION, SOLUTION INTRAVENOUS at 12:17

## 2021-09-01 ASSESSMENT — PAIN SCALES - GENERAL
PAINLEVEL_OUTOF10: 0

## 2021-09-01 NOTE — PROGRESS NOTES
[address] [place], [place] [address]ist   Progress Note    Admitting Date and Time: 8/29/2021  9:17 AM  Admit Dx: Upper GI bleed [K92.2]    Subjective:    Pt resting comfortably but was awakened he appears a little more confused today than yesterday. Patient continues to deny any complaints of pain or shortness of breath. ROS: denies fever, chills, cp, sob, n/v, HA unless stated above.      piperacillin-tazobactam  2,250 mg IntraVENous Q6H    pantoprazole  40 mg Oral QAM AC    vitamin B and C  1 tablet Oral Daily    bisacodyl  10 mg Rectal Daily    [Held by provider] carvedilol  6.25 mg Oral BID WC    [Held by provider] clopidogrel  75 mg Oral Daily    docusate sodium  100 mg Oral BID    donepezil  5 mg Oral Nightly    [Held by provider] ferrous sulfate  325 mg Oral Daily with breakfast    rosuvastatin  10 mg Oral Daily    sacubitril-valsartan  1 tablet Oral BID    torsemide  5 mg Oral Every Other Day    venlafaxine  37.5 mg Oral BID    sodium chloride flush  5-40 mL IntraVENous 2 times per day     sodium chloride, , PRN  sodium chloride flush, 5-40 mL, PRN  sodium chloride, 25 mL, PRN  ondansetron, 4 mg, Q8H PRN   Or  ondansetron, 4 mg, Q6H PRN  polyethylene glycol, 17 g, Daily PRN  acetaminophen, 650 mg, Q6H PRN   Or  acetaminophen, 650 mg, Q6H PRN         Objective:    BP (!) 148/76   Pulse 98   Temp 97.8 °F (36.6 °C) (Oral)   Resp 20   Ht 6' 4\" (1.93 m)   Wt 180 lb (81.6 kg)   SpO2 96%   BMI 21.91 kg/m²   General Appearance: alert and oriented to person but pleasant and confused and in no acute distress  Skin: warm and dry, heel wound, heel protectors on  Head: normocephalic and atraumatic  Eyes: pupils equal, round, and reactive to light, extraocular eye movements intact, conjunctivae normal  Neck: neck supple and non tender without mass   Pulmonary/Chest: diminished throughout, no wheezes, rales or rhonchi, normal air movement, no respiratory distress  Cardiovascular: normal rate, normal S1 and S2 and no carotid bruits  Abdomen: soft, non-tender, non-distended, normal bowel sounds    Extremities: no cyanosis, no clubbing and no edema  Neurologic: no cranial nerve deficit and speech normal      Recent Labs     08/30/21  0612 08/31/21  0932 09/01/21  0621    134 136   K 3.6 3.3* 3.4*    101 102   CO2 24 24 23   BUN 33* 26* 25*   CREATININE 1.8* 1.7* 1.7*   GLUCOSE 96 111* 120*   CALCIUM 9.1 8.6 8.9       Recent Labs     08/29/21  0951 08/31/21  0932   ALKPHOS 85 94   PROT 6.2* 5.3*   LABALBU 2.7* 2.3*   BILITOT 0.2 0.3   AST 10 11   ALT 8 7       Recent Labs     08/30/21  0612 08/30/21  1138 08/31/21  0932 08/31/21  1946 09/01/21  0621   WBC 12.2*  --  21.0*  --  28.9*   RBC 2.64*  --  2.55*  --  2.69*   HGB 8.3*   < > 8.0* 8.0* 8.5*   HCT 25.7*   < > 25.0* 24.5* 25.7*   MCV 97.3  --  98.0  --  95.5   MCH 31.4  --  31.4  --  31.6   MCHC 32.3  --  32.0  --  33.1   RDW 19.1*  --  18.6*  --  18.3*     --  264  --  267   MPV 10.5  --  10.5  --  10.3    < > = values in this interval not displayed. Radiology:   XR CHEST (2 VW)   Final Result   No radiographic evidence of acute cardiopulmonary disease. Assessment:  Principal Problem:    Upper GI bleed  Active Problems:    CKD (chronic kidney disease) stage 3, GFR 30-59 ml/min ([facility])    CAD (coronary artery disease), autologous vein bypass graft    CHF (congestive heart failure) ([facility])    Acute blood loss anemia    Moderate protein-calorie malnutrition ([facility])    Decubitus ulcer of left heel, unstageable ([facility])    Pressure injury of sacral region, stage 2 ([facility] [address]  Resolved Problems:    * No resolved hospital problems. *      Plan:    1.  Upper GI bleed - S/p 2 units PRBCs since admission - melena stool - H&H stable - continue PPI - EGD completed and small amount of coffee-ground material was noted otherwise no fresh or old blood seen - General diet - will restart Aspirin upon discharge  - Plavix and Eliquis held and most likely will be discharge - continue Protonix  - if bleeding or hgb drops then GI will do colonoscopy     2. Worsening leukocytosis - blood cultures x 2 sets obtained - urine culture grew GNRs < 10,000 - IV Cefepime - afebrile - possibly septic but source unknown      2. Combined systolic and diastolic HFrEF - no evidence of fluid over load - last echo was July 2021, EF 35-40% - no chest pain or shortness of breath - Entresto - will continue hold Demadex      3. Acute kidney injury superimposed on chronic kidney disease - creatinine unchanged at 1.7 - continue monitor renal function      4. Coronary artery disease - S/p 3 stent in 1993 - S/p carotid endartectomy inn 2016 - will resume Aspirin - holding and possibly discontinuing Plavix and Eliquis      5. Essential hypertension - controlled despite holding Coreg      6. Hyperlipidemia - Rosuvastatin      7. Acute blood loss anemia - stopped oral iron     8. Depression - pleasant and cooperative - remains on Effexor       9. Dementia - continue current treatment      10. S/p Left total hip replacement - PT/OT on - surgery at [facility] - from [address]. Heel wound  - wound care nurse consulted   NOTE: This report was transcribed using voice recognition software. Every effort was made to ensure accuracy; however, inadvertent computerized transcription errors may be present.      Electronically signed by LAZARO Chun CNP on 9/1/2021 at 8:19 AM

## 2021-09-01 NOTE — PROGRESS NOTES
PROGRESS NOTE  By Natalia Tao M.D. [facility]  Dr. Maryam Marrero M.D.,  Dr. Giovanni Zimmer M.D.,   Dr. Soto Valentin D.O.,  Dr. Marlon Alves M.D.,  Dr. Jasmyne Hodge D.O.,  Karena Rossi D.O. Bernell Pellet  80 y.o.  male    SUBJECTIVE:  Significantly more lethargic/somnolent. Daughter at bedside    OBJECTIVE:    BP (!) 148/76   Pulse 98   Temp 97.8 °F (36.6 °C) (Oral)   Resp 20   Ht 6' 4\" (1.93 m)   Wt 180 lb (81.6 kg)   SpO2 96%   BMI 21.91 kg/m²     General: NAD/ male  HEENT: Moist oral mucosa/no discharge no seizures  Neck: Trachea midline  Chest: Symmetric excursion/nonlabored respirations  Cor: Irregular  Abd.: Soft and not distended  Extr.: Decreased muscle tone and bulk throughout  Skin: Warm and dry        DATA:    Monitor data reviewed -atrial fibrillation noted. Lab Results   Component Value Date    WBC 28.9 09/01/2021    RBC 2.69 09/01/2021    HGB 8.5 09/01/2021    HCT 25.7 09/01/2021    MCV 95.5 09/01/2021    MCH 31.6 09/01/2021    MCHC 33.1 09/01/2021    RDW 18.3 09/01/2021     09/01/2021    MPV 10.3 09/01/2021     Lab Results   Component Value Date     09/01/2021    K 3.4 09/01/2021    K 3.6 08/30/2021     09/01/2021    CO2 23 09/01/2021    BUN 25 09/01/2021    CREATININE 1.7 09/01/2021    CALCIUM 8.9 09/01/2021    PROT 5.3 08/31/2021    LABALBU 2.3 08/31/2021    BILITOT 0.3 08/31/2021    ALKPHOS 94 08/31/2021    AST 11 08/31/2021    ALT 7 08/31/2021     No results found for: LIPASE  No results found for: AMYLASE      ASSESSMENT/PLAN:    1. Acute on chronic anemia/ Melena  - Hgb 6.7 on admission, from baseline 8-9  -EGD 8/31 revealing scant amount of coffee-ground material without evidence of ulcers or erosions  -Reported black stool however possible clearing of GI tract  -Repeat H&H  -Consider holding anticoagulation/Plavix if clinically feasible     2. History of CAD  - No recent PCI  - Recommend  as above     3. Anticoagulated on Eliquis  -Per admitting with recommendations as above     4. HFrEF  -EF 35%    5. HTN  -Per admitting    6. HLD  -Per admitting    7. COPD  -- Management per primary    8. Sepsis  -Per admitting/pertinent consultants       Nomi Garcia MD  9/1/2021  12:45 PM    NOTE:  This report was transcribed using voice recognition software. Every effort was made to ensure accuracy; however, inadvertent computerized transcription errors may be present.

## 2021-09-01 NOTE — PROGRESS NOTES
Physical Therapy    Physical Therapy Treatment Note/Plan of Care    Room #:  2294/2373-67  Patient Name: Jossie Brock  YOB: 1932  MRN: [id number]    Date of Service: 9/1/2021     Tentative placement recommendation: Subacute rehab  Equipment recommendation: To be determined      Evaluating Physical Therapist: Tunde Luna PT  #[id number]        Specific Provider Orders/Date/Referring Provider :  08/30/21 1000   PT eval and treat Start: 08/30/21 1000, End: 08/30/21 1000, ONE TIME, Standing Count: 1 Occurrences, R    Alberto Bowden DO    Admitting Diagnosis:   Upper GI bleed [K92.2]       Admitted with  anemia     Patient Active Problem List   Diagnosis    Renal insufficiency    CKD (chronic kidney disease) stage 3, GFR 30-59 ml/min ([facility])    Essential hypertension    CAD (coronary artery disease), autologous vein bypass graft    Acid reflux    Anemia    Depression    Diastolic congestive heart failure ([facility])    CHF (congestive heart failure) ([address]    CAD (coronary artery disease)    COPD (chronic obstructive pulmonary disease) ([facility])    Acute respiratory failure with hypoxia ([facility])    Acute blood loss anemia    MAVIS (acute kidney injury) ([address]    Hospital-acquired pneumonia    Moderate protein-calorie malnutrition ([facility])    Upper GI bleed    Decubitus ulcer of left heel, unstageable ([address]    Pressure injury of sacral region, stage 2 ([facility])        ASSESSMENT of Current Deficits Patient exhibits decreased strength, balance, endurance, range of motion and pain bilateral heels with open wound impairing functional mobility, transfers, gait , gait distance and tolerance to activity are barriers to d/c and require skilled intervention during hospital stay to attain pre hospital level of function. Patient lethargic throughout session, making conversation throughout for patient to stay awake and alert. PROM/AAROM with all exercise.  Decreased balance and endurance increases patient's risk for fall.  Patient needs continued PT to improve strength and endurance to tolerate and complete functional mobility with decreased assist required. PHYSICAL THERAPY  PLAN OF CARE       Physical therapy plan of care is established based on physician order,  patient diagnosis and clinical assessment    Current Treatment Recommendations:    -Bed Mobility: Lower extremity exercises , Upper extremity exercises  and Trunk control activities   -Sitting Balance: Incorporate reaching activities to activate trunk muscles   -Standing Balance: Perform strengthening exercises in standing to promote motor control with or without upper extremity support  and Challenge balance utilizing reaching  activities beyond center of gravity    -Transfers: Provide instruction on proper hand and foot position for adequate transfer of weight onto lower extremities and use of gait device, Cues for hand placement, technique and safety, Assist with extension of knees trunk and hip to accept weight transfer  and Provide stabilization to prevent fall   -Gait: Gait training, Standing activities to improve: base of support, weight shift, weight bearing , Exercises to improve trunk control and Exercises to improve hip and knee control   -Endurance: Utilize Supervised activities to increase level of endurance to allow for safe functional mobility including transfers and gait     PT long term treatment goals are located in below grid    Patient and or family understand(s) diagnosis, prognosis, and plan of care. Frequency of treatments: Patient will be seen  daily.          Prior Level of Function: Patient ambulated with wheeled walker    Rehab Potential: good    for baseline    Past medical history:   Past Medical History:   Diagnosis Date    CAD (coronary artery disease)     CHF (congestive heart failure) (HCC)     COPD (chronic obstructive pulmonary disease) (HCC)     GERD (gastroesophageal reflux disease)     Hyperlipidemia     Hypertension     Myocardial infarction [facility] 15 yrs ago     Past Surgical History:   Procedure Laterality Date    CATARACT REMOVAL WITH IMPLANT Right 3/3/14    CATARACT REMOVAL WITH IMPLANT Left 10/13/2014    COLONOSCOPY      CORONARY ARTERY BYPASS GRAFT  15 yrs ago    3 stents    ENDOSCOPY, COLON, DIAGNOSTIC      UPPER GASTROINTESTINAL ENDOSCOPY N/A 2021    EGD ESOPHAGOGASTRODUODENOSCOPY performed by Bren Soler MD at [address]:    Precautions: Up with assistance, falls and alarm ,  Hip precautions left lower extremity   Social history: Patient lives with daughterMarlene  Wife  in january in a ranch home  with 2 steps  to enter with [facility] owned: Marifer Evans,  Luis Alfredo,     [address]   How much difficulty turning over in bed?: A Lot  How much difficulty sitting down on / standing up from a chair with arms?: A Lot  How much difficulty moving from lying on back to sitting on side of bed?: A Lot  How much help from another person moving to and from a bed to a chair?: A Lot  How much help from another person needed to walk in hospital room?: A Lot  How much help from another person for climbing 3-5 steps with a railing?: A Lot  AM-PAC Inpatient Mobility Raw Score : 12  AM-PAC Inpatient T-Scale Score : 35.33  Mobility Inpatient CMS 0-100% Score: 68.66  Mobility Inpatient CMS G-Code Modifier : CL    Nursing cleared patient for PT treatment. only agreed on bed exercises, stated he is not getting out of bed today. OBJECTIVE;   Initial Evaluation  Date: 2021 Treatment Date:    2021   Short Term/ Long Term   Goals   Was pt agreeable to Eval/treatment? Yes Yes  To be met in 3 days   Pain level   5/10  bilateral heels Not stated however stated watch my left leg. Bed Mobility    Rolling: Minimal assist of 1    Supine to sit: Moderate assist of 1    Sit to supine:  Moderate assist of 1    Scooting: Minimal assist of 1   Rolling: Not assessed    Supine to sit: Not assessed    Sit to supine: Not assessed    Scooting: Not assessed     Rolling: Independent    Supine to sit: Independent    Sit to supine: Independent    Scooting: Independent     Transfers Sit to stand: Moderate assist of 1   Sit to stand: Not assessed       Sit to stand: Supervision      Ambulation    2 steps using  wheeled walker with Moderate assist of 1   for walker control, upright and weight shift and cues for safety and proper hand placement not assessed     50 feet using  wheeled walker with Supervision     Stair negotiation: ascended and descended   Not assessed  Not assessed     2 steps with rail min a   ROM Within functional limits  with exception of . Increase range of motion 10% of affected joints    Strength BUE:  3+/5  RLE:  4/5  LLE:  3/5  Wound on heel  Increase strength in affected mm groups by 1/3 grade   Balance Sitting EOB:  good    Dynamic Standing:  poor   Sitting EOB: not assessed   Dynamic Standing: not assessed    Sitting EOB:  good    Dynamic Standing: fair       Patient is Alert & Oriented x person, place, time and situation and follows one step directions  Flat affect  Sensation:  Patient  denies numbness/tingling   Edema:  yes left lower extremity   Endurance: poor tolerance to upright with fatigue       Patient education  Patient educated on role of Physical Therapy, risks of immobility, safety and plan of care and  importance of mobility while in hospital      Patient response to education:   Pt verbalized understanding Pt demonstrated skill Pt requires further education in this area   Yes Partial Yes      Treatment:  Patient practiced and was instructed/facilitated in the following treatment: Patient    performed supine exercise at end of session, prevalon boots donned. Therapeutic Exercises:  heel slide, hip abduction/adduction and straight leg raise 2 x 10 reps.        At end of session, patient in bed with prevalon boots with alarm call light and phone within reach,  all lines and tubes intact, nursing notified. Patient would benefit from continued skilled Physical Therapy to improve functional independence and quality of life.          Patient's/ family goals   home    Time in  153  Time out  204    Total Treatment Time  11 minutes        CPT codes:    Therapeutic exercises (82696)   11 minutes  1 unit(s)    Nancy Velázquez PTA  JUSTYNA#[id number]

## 2021-09-02 LAB
BASOPHILS ABSOLUTE: 0.06 E9/L (ref 0–0.2)
BASOPHILS RELATIVE PERCENT: 0.3 % (ref 0–2)
EOSINOPHILS ABSOLUTE: 0.19 E9/L (ref 0.05–0.5)
EOSINOPHILS RELATIVE PERCENT: 1.1 % (ref 0–6)
HCT VFR BLD CALC: 22.2 % (ref 37–54)
HCT VFR BLD CALC: 23.1 % (ref 37–54)
HEMOGLOBIN: 7.2 G/DL (ref 12.5–16.5)
HEMOGLOBIN: 7.5 G/DL (ref 12.5–16.5)
IMMATURE GRANULOCYTES #: 0.2 E9/L
IMMATURE GRANULOCYTES %: 1.1 % (ref 0–5)
LYMPHOCYTES ABSOLUTE: 1.51 E9/L (ref 1.5–4)
LYMPHOCYTES RELATIVE PERCENT: 8.5 % (ref 20–42)
MCH RBC QN AUTO: 31.4 PG (ref 26–35)
MCHC RBC AUTO-ENTMCNC: 32.5 % (ref 32–34.5)
MCV RBC AUTO: 96.7 FL (ref 80–99.9)
MONOCYTES ABSOLUTE: 0.96 E9/L (ref 0.1–0.95)
MONOCYTES RELATIVE PERCENT: 5.4 % (ref 2–12)
NEUTROPHILS ABSOLUTE: 14.86 E9/L (ref 1.8–7.3)
NEUTROPHILS RELATIVE PERCENT: 83.6 % (ref 43–80)
PDW BLD-RTO: 18.2 FL (ref 11.5–15)
PLATELET # BLD: 245 E9/L (ref 130–450)
PMV BLD AUTO: 10.5 FL (ref 7–12)
RBC # BLD: 2.39 E12/L (ref 3.8–5.8)
URINE CULTURE, ROUTINE: NORMAL
WBC # BLD: 17.8 E9/L (ref 4.5–11.5)

## 2021-09-02 PROCEDURE — 6360000002 HC RX W HCPCS: Performed by: INTERNAL MEDICINE

## 2021-09-02 PROCEDURE — 36415 COLL VENOUS BLD VENIPUNCTURE: CPT

## 2021-09-02 PROCEDURE — 85025 COMPLETE CBC W/AUTO DIFF WBC: CPT

## 2021-09-02 PROCEDURE — 85018 HEMOGLOBIN: CPT

## 2021-09-02 PROCEDURE — 2060000000 HC ICU INTERMEDIATE R&B

## 2021-09-02 PROCEDURE — APPSS30 APP SPLIT SHARED TIME 16-30 MINUTES: Performed by: NURSE PRACTITIONER

## 2021-09-02 PROCEDURE — 6370000000 HC RX 637 (ALT 250 FOR IP): Performed by: INTERNAL MEDICINE

## 2021-09-02 PROCEDURE — 85014 HEMATOCRIT: CPT

## 2021-09-02 PROCEDURE — 2580000003 HC RX 258: Performed by: INTERNAL MEDICINE

## 2021-09-02 PROCEDURE — 99232 SBSQ HOSP IP/OBS MODERATE 35: CPT | Performed by: INTERNAL MEDICINE

## 2021-09-02 RX ADMIN — SACUBITRIL AND VALSARTAN 1 TABLET: 24; 26 TABLET, FILM COATED ORAL at 08:58

## 2021-09-02 RX ADMIN — CEFEPIME HYDROCHLORIDE 1000 MG: 1 INJECTION, POWDER, FOR SOLUTION INTRAMUSCULAR; INTRAVENOUS at 21:30

## 2021-09-02 RX ADMIN — SACUBITRIL AND VALSARTAN 1 TABLET: 24; 26 TABLET, FILM COATED ORAL at 20:26

## 2021-09-02 RX ADMIN — PANTOPRAZOLE SODIUM 40 MG: 40 TABLET, DELAYED RELEASE ORAL at 05:45

## 2021-09-02 RX ADMIN — Medication 1 TABLET: at 09:12

## 2021-09-02 RX ADMIN — VENLAFAXINE 37.5 MG: 37.5 TABLET ORAL at 20:26

## 2021-09-02 RX ADMIN — DOCUSATE SODIUM 100 MG: 100 CAPSULE, LIQUID FILLED ORAL at 20:26

## 2021-09-02 RX ADMIN — ROSUVASTATIN CALCIUM 10 MG: 10 TABLET, COATED ORAL at 08:57

## 2021-09-02 RX ADMIN — VENLAFAXINE 37.5 MG: 37.5 TABLET ORAL at 09:12

## 2021-09-02 RX ADMIN — DONEPEZIL HYDROCHLORIDE 5 MG: 5 TABLET, ORALLY DISINTEGRATING ORAL at 20:26

## 2021-09-02 RX ADMIN — SODIUM CHLORIDE 25 ML: 9 INJECTION, SOLUTION INTRAVENOUS at 13:57

## 2021-09-02 RX ADMIN — CEFEPIME HYDROCHLORIDE 1000 MG: 1 INJECTION, POWDER, FOR SOLUTION INTRAMUSCULAR; INTRAVENOUS at 08:58

## 2021-09-02 RX ADMIN — DOCUSATE SODIUM 100 MG: 100 CAPSULE, LIQUID FILLED ORAL at 08:57

## 2021-09-02 ASSESSMENT — PAIN SCALES - GENERAL
PAINLEVEL_OUTOF10: 0
PAINLEVEL_OUTOF10: 0

## 2021-09-02 NOTE — CARE COORDINATION
9/2/21 1312 CM note: NO COVID TESTING THIS ADMIT. Per pts daughter, Zi Johansen, discharge plan is to return to [facility] when medically stable. Per Max Darden, [facility] liaison, patient is a bedhold; however it would be beneficial for patient to return to facility skilled therefore [address] (WHICH IS GOOD WITH 7 DAYS OF RETURN DATE).  Electronically signed by Radha Miller RN on 9/2/2021 at 1:14 PM

## 2021-09-02 NOTE — PROGRESS NOTES
PROGRESS NOTE  By Gary Helton M.D. [facility]  Dr. Geraline Moritz, M.D.,  Dr. Josh Espinoza M.D.,   Dr. Alfredo Nuñez D.O.,  Dr. June Gutierrez M.D.,  Dr. Ena Garcia D.O.,  Chhaya Dolan D.O. Tamea Dance  80 y.o.  male    SUBJECTIVE:  Denies abdominal pain. Denies nausea vomiting. No family bedside    OBJECTIVE:    /60   Pulse 70   Temp 97.5 °F (36.4 °C) (Oral)   Resp 18   Ht 6' 4\" (1.93 m)   Wt 180 lb (81.6 kg)   SpO2 95%   BMI 21.91 kg/m²     General: NAD/ male. Elderly  HEENT: Anicteric sclera/moist oral mucosa  Neck: Supple with trachea midline  Chest: CTA  Cor: Appears regular without gallop  Abd.: Soft/NT/ND  Extr.:  Decreased muscle tone and bulk throughout  Skin: Warm and dry  Other: Incontinent bowel movement observed to be brown and liquid without blood or melena      DATA:    Monitor data reviewed -atrial fibrillation noted. Lab Results   Component Value Date    WBC 17.8 09/02/2021    RBC 2.39 09/02/2021    HGB 7.5 09/02/2021    HCT 23.1 09/02/2021    MCV 96.7 09/02/2021    MCH 31.4 09/02/2021    MCHC 32.5 09/02/2021    RDW 18.2 09/02/2021     09/02/2021    MPV 10.5 09/02/2021     Lab Results   Component Value Date     09/01/2021    K 3.4 09/01/2021    K 3.6 08/30/2021     09/01/2021    CO2 23 09/01/2021    BUN 25 09/01/2021    CREATININE 1.7 09/01/2021    CALCIUM 8.9 09/01/2021    PROT 5.3 08/31/2021    LABALBU 2.3 08/31/2021    BILITOT 0.3 08/31/2021    ALKPHOS 94 08/31/2021    AST 11 08/31/2021    ALT 7 08/31/2021     No results found for: LIPASE  No results found for: AMYLASE      ASSESSMENT/PLAN:       1. Acute on chronic anemia/ Melena  - Hgb 6.7 on admission, from baseline 8-9  -EGD 8/31 revealing scant amount of coffee-ground material without evidence of ulcers or erosions  -Reported black stool however possible clearing of GI tract -recurrent stool 2 days brown  -Repeat H&H revealed slight decrease  -Consider holding anticoagulation/Plavix if clinically feasible  -No immediate plan for repeat endoscopy     2. History of CAD  - No recent PCI  - Recommend  as above     3. Anticoagulated on Eliquis  -Per admitting with recommendations as above     4. HFrEF  -EF 35%     5. HTN  -Per admitting     6. HLD  -Per admitting     7. COPD  -- Management per primary     8. Sepsis  -Per admitting/pertinent consultants       Nomi Garcia MD  9/2/2021  1:46 PM    NOTE:  This report was transcribed using voice recognition software. Every effort was made to ensure accuracy; however, inadvertent computerized transcription errors may be present.

## 2021-09-02 NOTE — PROGRESS NOTES
[address] [place], [place] [address]ist   Progress Note    Admitting Date and Time: 8/29/2021  9:17 AM  Admit Dx: Upper GI bleed [K92.2]    Subjective:    Pt voiced no complaints of discomfort this morning. Patient smells like he is bleeding. Patient is more awake and alert and answering some questions appropriately. ROS: denies fever, chills, cp, sob, n/v, HA unless stated above.      cefepime  1,000 mg IntraVENous Q12H    pantoprazole  40 mg Oral QAM AC    vitamin B and C  1 tablet Oral Daily    bisacodyl  10 mg Rectal Daily    [Held by provider] carvedilol  6.25 mg Oral BID WC    [Held by provider] clopidogrel  75 mg Oral Daily    docusate sodium  100 mg Oral BID    donepezil  5 mg Oral Nightly    [Held by provider] ferrous sulfate  325 mg Oral Daily with breakfast    rosuvastatin  10 mg Oral Daily    sacubitril-valsartan  1 tablet Oral BID    [Held by provider] torsemide  5 mg Oral Every Other Day    venlafaxine  37.5 mg Oral BID    sodium chloride flush  5-40 mL IntraVENous 2 times per day     sodium chloride, , PRN  sodium chloride flush, 5-40 mL, PRN  sodium chloride, 25 mL, PRN  ondansetron, 4 mg, Q8H PRN   Or  ondansetron, 4 mg, Q6H PRN  polyethylene glycol, 17 g, Daily PRN  acetaminophen, 650 mg, Q6H PRN   Or  acetaminophen, 650 mg, Q6H PRN         Objective:    /60   Pulse 70   Temp 97.5 °F (36.4 °C) (Oral)   Resp 18   Ht 6' 4\" (1.93 m)   Wt 180 lb (81.6 kg)   SpO2 95%   BMI 21.91 kg/m²   General Appearance: alert and oriented to person, but pleasantly confused and in no acute distress  Skin: warm and dry  Head: normocephalic and atraumatic  Eyes: pupils equal, round, and reactive to light,  conjunctivae normal  Neck: neck supple and non tender without mass   Pulmonary/Chest: diminished with no adventitious breath sounds noted, normal air movement, no respiratory distress  Cardiovascular: normal rate, normal S1 and S2 and no carotid bruits  Abdomen: soft, non-tender, non-distended, normal bowel sounds   Extremities: no cyanosis, no clubbing and no edema, bilateral heel protectors on   Neurologic: no cranial nerve deficit and speech normal      Recent Labs     08/31/21  0932 09/01/21  0621    136   K 3.3* 3.4*    102   CO2 24 23   BUN 26* 25*   CREATININE 1.7* 1.7*   GLUCOSE 111* 120*   CALCIUM 8.6 8.9       Recent Labs     08/31/21  0932   ALKPHOS 94   PROT 5.3*   LABALBU 2.3*   BILITOT 0.3   AST 11   ALT 7       Recent Labs     08/31/21  0932 08/31/21  0932 08/31/21  1946 09/01/21  0621 09/02/21  0813   WBC 21.0*  --   --  28.9* 17.8*   RBC 2.55*  --   --  2.69* 2.39*   HGB 8.0*   < > 8.0* 8.5* 7.5*   HCT 25.0*   < > 24.5* 25.7* 23.1*   MCV 98.0  --   --  95.5 96.7   MCH 31.4  --   --  31.6 31.4   MCHC 32.0  --   --  33.1 32.5   RDW 18.6*  --   --  18.3* 18.2*     --   --  267 245   MPV 10.5  --   --  10.3 10.5    < > = values in this interval not displayed. Radiology:   US RETROPERITONEAL COMPLETE   Final Result   1. Preserved cortical thickness bilaterally. No hydronephrosis. 2.  3.7 cm left lower pole simple appearing renal cyst.      3.  Circumferentially thickened bladder wall. Nonspecific finding that may   reflect a sequela of chronic bladder outlet obstruction or potentially   chronic cystitis. Please correlate with clinical presentation. XR CHEST (2 VW)   Final Result   No radiographic evidence of acute cardiopulmonary disease.              Assessment:  Principal Problem:    Upper GI bleed  Active Problems:    CKD (chronic kidney disease) stage 3, GFR 30-59 ml/min ([facility])    CAD (coronary artery disease), autologous vein bypass graft    CHF (congestive heart failure) ([facility])    Acute blood loss anemia    Moderate protein-calorie malnutrition ([facility])    Decubitus ulcer of left heel, unstageable ([facility])    Pressure injury of sacral region, stage 2 ([address]    Acute cystitis with hematuria    Sepsis ([facility]  Resolved Problems:    * No resolved hospital problems. *      Plan:    1. Upper GI bleed - S/p 2 units PRBCs since admission - melena stool - H&H stable - continue PPI - EGD completed and small amount of coffee-ground material was noted otherwise no fresh or old blood seen - General diet - will restart Aspirin upon discharge  - Plavix and Eliquis held and most likely will be discharge - continue Protonix  - if bleeding or hgb drops then GI will do colonoscopy - patient BM smells like blood      2. Worsening leukocytosis - blood cultures x 2 sets obtained - urine culture grew GNRs < 10,000 - IV Cefepime - afebrile - possibly septic but source unknown      2. Combined systolic and diastolic HFrEF - breathing easy on room air - last echo was July 2021, EF 35-40%  - Qi Melendrez - will continue hold Demadex      3. Acute kidney injury superimposed on chronic kidney disease - creatinine unchanged at 1.7 - continue monitor renal function      4. Coronary artery disease - S/p 3 stent in 1993 - S/p carotid endartectomy inn 2016 - will resume Aspirin - holding and possibly discontinuing Plavix and Eliquis      5. Essential hypertension - blood pressure improved continue holding Carvedilol       6. Hyperlipidemia - statin      7. Acute blood loss anemia -  H&H stable - GI following      8. Depression -  pleasantly confused and follows some simple commands - on medication      9. Dementia - continue Aricept      10. S/p Left total hip replacement - PT/OT on - surgery at [facility] [place] - from [address]. Heel wound  - wound care nurse on - bilateral heel protectors on         NOTE: This report was transcribed using voice recognition software. Every effort was made to ensure accuracy; however, inadvertent computerized transcription errors may be present.      Electronically signed by LAZARO Kraus CNP on 9/2/2021 at 4:27 PM

## 2021-09-02 NOTE — PROGRESS NOTES
Comprehensive Nutrition Assessment    Type and Reason for Visit:  Reassess    Nutrition Recommendations/Plan: Continue with diet and start Ensure Enlove BID    Nutrition Assessment:  Pt w/ acute on chronic anemia d/t suspected GI bleed. Hx CKD, CHF, CAD. Moderate malnutrition. Continue with diet, p.o. intakes remain ~25%. Will start ONS BID    Malnutrition Assessment:  Malnutrition Status: Moderate malnutrition    Context:  Chronic Illness     Findings of the 6 clinical characteristics of malnutrition:  Energy Intake:  Unable to assess  Weight Loss:  No significant weight loss     Body Fat Loss:  1 - Mild body fat loss Orbital, Buccal region, Triceps   Muscle Mass Loss:  1 - Mild muscle mass loss Temples (temporalis), Clavicles (pectoralis & deltoids)  Fluid Accumulation:  No significant fluid accumulation     Strength:  Not Performed    Estimated Daily Nutrient Needs:  Energy (kcal):   (1.3 SF); Weight Used for Energy Requirements:  Current     Protein (g):  115-140 (1.4-1.7); Weight Used for Protein Requirements:  Current        Fluid (ml/day):  ; Method Used for Fluid Requirements:  1 ml/kcal      Nutrition Related Findings:  A/ox1 w/confusion, no edema, I/O WNL, abd WDL, +BS, renal labs elevated      Wounds:  Pressure Injury, Stage II, Unstageable, Deep Tissue Injury       Current Nutrition Therapies:    ADULT DIET;  Regular  Adult Oral Nutrition Supplement; Standard High Calorie/High Protein Oral Supplement    Anthropometric Measures:  · Height: 6' 4\" (193 cm)  · Current Body Weight: 180 lb (81.6 kg) (8/29)   · Admission Body Weight: 180 lb (81.6 kg) (8/29 bed)    · Usual Body Weight: 172 lb (78 kg) (7/15 bed, per EMR)     · Ideal Body Weight: 202 lbs; % Ideal Body Weight 89.1 %   · BMI: 21.9  · Adjusted Body Weight:  ; No Adjustment   · BMI Categories: Underweight (BMI less than 22) age over 72       Nutrition Diagnosis:   · Moderate malnutrition, In context of chronic illness related to cognitive or neurological impairment (dementia) as evidenced by poor intake prior to admission, mild loss of subcutaneous fat, mild muscle loss      Nutrition Interventions:   Food and/or Nutrient Delivery:  Continue Current Diet  Nutrition Education/Counseling:  Education not appropriate   Coordination of Nutrition Care:  Continue to monitor while inpatient    Goals:  Pt to consume >50% meals/ONS       Nutrition Monitoring and Evaluation:   Behavioral-Environmental Outcomes:  None Identified   Food/Nutrient Intake Outcomes:  Food and Nutrient Intake, Supplement Intake  Physical Signs/Symptoms Outcomes:  Biochemical Data, Fluid Status or Edema, Hemodynamic Status, Nutrition Focused Physical Findings, Skin, Weight     Discharge Planning:     Too soon to determine     Electronically signed by Swati Montes RD, LD on 9/2/21 at 12:42 PM EDT    Contact: Daniele Dover

## 2021-09-03 ENCOUNTER — APPOINTMENT (OUTPATIENT)
Dept: NUCLEAR MEDICINE | Age: 86
DRG: 871 | End: 2021-09-03
Payer: MEDICARE

## 2021-09-03 LAB
ANION GAP SERPL CALCULATED.3IONS-SCNC: 8 MMOL/L (ref 7–16)
BUN BLDV-MCNC: 21 MG/DL (ref 6–23)
CALCIUM SERPL-MCNC: 7.9 MG/DL (ref 8.6–10.2)
CHLORIDE BLD-SCNC: 103 MMOL/L (ref 98–107)
CO2: 24 MMOL/L (ref 22–29)
CREAT SERPL-MCNC: 1.6 MG/DL (ref 0.7–1.2)
GFR AFRICAN AMERICAN: 49
GFR NON-AFRICAN AMERICAN: 41 ML/MIN/1.73
GLUCOSE BLD-MCNC: 98 MG/DL (ref 74–99)
HCT VFR BLD CALC: 21.3 % (ref 37–54)
HCT VFR BLD CALC: 22 % (ref 37–54)
HEMOGLOBIN: 7 G/DL (ref 12.5–16.5)
HEMOGLOBIN: 7.2 G/DL (ref 12.5–16.5)
MAGNESIUM: 1.6 MG/DL (ref 1.6–2.6)
MCH RBC QN AUTO: 31.4 PG (ref 26–35)
MCH RBC QN AUTO: 31.5 PG (ref 26–35)
MCHC RBC AUTO-ENTMCNC: 32.7 % (ref 32–34.5)
MCHC RBC AUTO-ENTMCNC: 32.9 % (ref 32–34.5)
MCV RBC AUTO: 95.9 FL (ref 80–99.9)
MCV RBC AUTO: 96.1 FL (ref 80–99.9)
PDW BLD-RTO: 17.8 FL (ref 11.5–15)
PDW BLD-RTO: 17.9 FL (ref 11.5–15)
PLATELET # BLD: 216 E9/L (ref 130–450)
PLATELET # BLD: 236 E9/L (ref 130–450)
PMV BLD AUTO: 10.4 FL (ref 7–12)
PMV BLD AUTO: 10.6 FL (ref 7–12)
POTASSIUM SERPL-SCNC: 2.7 MMOL/L (ref 3.5–5)
POTASSIUM SERPL-SCNC: 3.5 MMOL/L (ref 3.5–5)
RBC # BLD: 2.22 E12/L (ref 3.8–5.8)
RBC # BLD: 2.29 E12/L (ref 3.8–5.8)
SODIUM BLD-SCNC: 135 MMOL/L (ref 132–146)
WBC # BLD: 10.8 E9/L (ref 4.5–11.5)
WBC # BLD: 11.2 E9/L (ref 4.5–11.5)

## 2021-09-03 PROCEDURE — 84132 ASSAY OF SERUM POTASSIUM: CPT

## 2021-09-03 PROCEDURE — 85027 COMPLETE CBC AUTOMATED: CPT

## 2021-09-03 PROCEDURE — 6360000002 HC RX W HCPCS: Performed by: NURSE PRACTITIONER

## 2021-09-03 PROCEDURE — APPSS30 APP SPLIT SHARED TIME 16-30 MINUTES: Performed by: NURSE PRACTITIONER

## 2021-09-03 PROCEDURE — 2580000003 HC RX 258: Performed by: INTERNAL MEDICINE

## 2021-09-03 PROCEDURE — 6370000000 HC RX 637 (ALT 250 FOR IP): Performed by: INTERNAL MEDICINE

## 2021-09-03 PROCEDURE — 97530 THERAPEUTIC ACTIVITIES: CPT

## 2021-09-03 PROCEDURE — 6360000002 HC RX W HCPCS: Performed by: INTERNAL MEDICINE

## 2021-09-03 PROCEDURE — 97110 THERAPEUTIC EXERCISES: CPT

## 2021-09-03 PROCEDURE — 78278 ACUTE GI BLOOD LOSS IMAGING: CPT

## 2021-09-03 PROCEDURE — 36415 COLL VENOUS BLD VENIPUNCTURE: CPT

## 2021-09-03 PROCEDURE — 80048 BASIC METABOLIC PNL TOTAL CA: CPT

## 2021-09-03 PROCEDURE — A9560 TC99M LABELED RBC: HCPCS | Performed by: RADIOLOGY

## 2021-09-03 PROCEDURE — 99232 SBSQ HOSP IP/OBS MODERATE 35: CPT | Performed by: INTERNAL MEDICINE

## 2021-09-03 PROCEDURE — 6370000000 HC RX 637 (ALT 250 FOR IP): Performed by: NURSE PRACTITIONER

## 2021-09-03 PROCEDURE — 3430000000 HC RX DIAGNOSTIC RADIOPHARMACEUTICAL: Performed by: RADIOLOGY

## 2021-09-03 PROCEDURE — 83735 ASSAY OF MAGNESIUM: CPT

## 2021-09-03 PROCEDURE — 97535 SELF CARE MNGMENT TRAINING: CPT

## 2021-09-03 PROCEDURE — 2060000000 HC ICU INTERMEDIATE R&B

## 2021-09-03 RX ORDER — POTASSIUM CHLORIDE 7.45 MG/ML
10 INJECTION INTRAVENOUS ONCE
Status: COMPLETED | OUTPATIENT
Start: 2021-09-03 | End: 2021-09-03

## 2021-09-03 RX ORDER — SODIUM CHLORIDE, SODIUM LACTATE, POTASSIUM CHLORIDE, CALCIUM CHLORIDE 600; 310; 30; 20 MG/100ML; MG/100ML; MG/100ML; MG/100ML
INJECTION, SOLUTION INTRAVENOUS CONTINUOUS
Status: DISCONTINUED | OUTPATIENT
Start: 2021-09-03 | End: 2021-09-04

## 2021-09-03 RX ORDER — POTASSIUM CHLORIDE 20 MEQ/1
40 TABLET, EXTENDED RELEASE ORAL ONCE
Status: COMPLETED | OUTPATIENT
Start: 2021-09-03 | End: 2021-09-03

## 2021-09-03 RX ADMIN — Medication 20 MILLICURIE: at 12:37

## 2021-09-03 RX ADMIN — SACUBITRIL AND VALSARTAN 1 TABLET: 24; 26 TABLET, FILM COATED ORAL at 08:21

## 2021-09-03 RX ADMIN — Medication 10 ML: at 08:23

## 2021-09-03 RX ADMIN — PANTOPRAZOLE SODIUM 40 MG: 40 TABLET, DELAYED RELEASE ORAL at 05:27

## 2021-09-03 RX ADMIN — ACETAMINOPHEN 650 MG: 325 TABLET ORAL at 15:30

## 2021-09-03 RX ADMIN — Medication 1 TABLET: at 08:21

## 2021-09-03 RX ADMIN — CEFEPIME HYDROCHLORIDE 1000 MG: 1 INJECTION, POWDER, FOR SOLUTION INTRAMUSCULAR; INTRAVENOUS at 10:46

## 2021-09-03 RX ADMIN — SODIUM CHLORIDE 25 ML: 9 INJECTION, SOLUTION INTRAVENOUS at 01:52

## 2021-09-03 RX ADMIN — DOCUSATE SODIUM 100 MG: 100 CAPSULE, LIQUID FILLED ORAL at 20:53

## 2021-09-03 RX ADMIN — SACUBITRIL AND VALSARTAN 1 TABLET: 24; 26 TABLET, FILM COATED ORAL at 20:53

## 2021-09-03 RX ADMIN — DOCUSATE SODIUM 100 MG: 100 CAPSULE, LIQUID FILLED ORAL at 08:22

## 2021-09-03 RX ADMIN — CEFEPIME HYDROCHLORIDE 1000 MG: 1 INJECTION, POWDER, FOR SOLUTION INTRAMUSCULAR; INTRAVENOUS at 20:53

## 2021-09-03 RX ADMIN — POTASSIUM CHLORIDE 40 MEQ: 1500 TABLET, EXTENDED RELEASE ORAL at 14:05

## 2021-09-03 RX ADMIN — VENLAFAXINE 37.5 MG: 37.5 TABLET ORAL at 08:22

## 2021-09-03 RX ADMIN — Medication 10 ML: at 20:55

## 2021-09-03 RX ADMIN — SODIUM CHLORIDE, POTASSIUM CHLORIDE, SODIUM LACTATE AND CALCIUM CHLORIDE: 600; 310; 30; 20 INJECTION, SOLUTION INTRAVENOUS at 14:05

## 2021-09-03 RX ADMIN — BISACODYL 10 MG: 10 SUPPOSITORY RECTAL at 08:22

## 2021-09-03 RX ADMIN — ROSUVASTATIN CALCIUM 10 MG: 10 TABLET, COATED ORAL at 08:21

## 2021-09-03 RX ADMIN — DONEPEZIL HYDROCHLORIDE 5 MG: 5 TABLET, ORALLY DISINTEGRATING ORAL at 20:53

## 2021-09-03 RX ADMIN — POTASSIUM CHLORIDE 10 MEQ: 7.46 INJECTION, SOLUTION INTRAVENOUS at 14:04

## 2021-09-03 RX ADMIN — VENLAFAXINE 37.5 MG: 37.5 TABLET ORAL at 20:53

## 2021-09-03 ASSESSMENT — PAIN DESCRIPTION - ONSET: ONSET: SUDDEN

## 2021-09-03 ASSESSMENT — PAIN DESCRIPTION - LOCATION: LOCATION: BACK;HEAD

## 2021-09-03 ASSESSMENT — PAIN - FUNCTIONAL ASSESSMENT: PAIN_FUNCTIONAL_ASSESSMENT: ACTIVITIES ARE NOT PREVENTED

## 2021-09-03 ASSESSMENT — PAIN SCALES - GENERAL
PAINLEVEL_OUTOF10: 9
PAINLEVEL_OUTOF10: 0

## 2021-09-03 ASSESSMENT — PAIN DESCRIPTION - DESCRIPTORS: DESCRIPTORS: ACHING;PENETRATING;SHARP

## 2021-09-03 ASSESSMENT — PAIN DESCRIPTION - PAIN TYPE: TYPE: ACUTE PAIN

## 2021-09-03 ASSESSMENT — PAIN DESCRIPTION - FREQUENCY: FREQUENCY: INTERMITTENT

## 2021-09-03 ASSESSMENT — PAIN DESCRIPTION - ORIENTATION: ORIENTATION: POSTERIOR

## 2021-09-03 NOTE — CARE COORDINATION
SS Note: No Covid testing, WILL NEED RAPID COVID (WHICH IS GOOD WITH 7 DAYS OF RETURN DATE). SW spoke to Paola Felix, regarding pt's return and she is [facility]. THAIS will need signed by physician.   Electronically signed by LD Pearson on 9/3/2021 at 1:01 PM

## 2021-09-03 NOTE — PROGRESS NOTES
OT BEDSIDE TREATMENT NOTE      Date:9/3/2021  Patient Name: Alisson Mitchell  MRN: [id number]  : 1932  Room: [address] [place] [address]        Evaluating OT: LATOYA Palomo/JENS; [id number]        Referring Provider and Orders/Date:   OT eval and treat Start: 21 1000, End: 21 1000, ONE TIME, Standing Count: 1 Occurrences, R    Alberto Bowden DO     Diagnosis:   1. Upper GI bleed              Pertinent Medical History:        Past Medical History        Past Medical History:   Diagnosis Date    CAD (coronary artery disease)      CHF (congestive heart failure) (HCC)      COPD (chronic obstructive pulmonary disease) (HCC)      GERD (gastroesophageal reflux disease)      Hyperlipidemia      Hypertension      Myocardial infarction ([facility] [address] 15 yrs ago             Past Surgical History         Past Surgical History:   Procedure Laterality Date    CATARACT REMOVAL WITH IMPLANT Right 3/3/14    CATARACT REMOVAL WITH IMPLANT Left 10/13/2014    COLONOSCOPY        CORONARY ARTERY BYPASS GRAFT   15 yrs ago     3 stents    ENDOSCOPY, COLON, DIAGNOSTIC               Precautions:  Fall Risk, confusion    Recommended placement:subacute as PLOF   Comment: (pt states he wants to go home after [facility] and is afraid he won't make it home; talked with pt about after goals at [facility] met, then pt is able to return home)     Assessment of current deficits     [x]? Functional mobility           [x]? ADLs           [x]? Strength                   [x]? Cognition     [x]? Functional transfers         [x]? IADLs         [x]? Safety Awareness   [x]? Endurance     []? Fine Coordination                        [x]? Balance      []? Vision/perception    []? Sensation       [x]? Gross Motor Coordination            []? ROM           []?  Delirium                   []? Motor Control      OT PLAN OF CARE   OT POC based on physician orders, patient diagnosis and results of clinical assessment     Frequency/Duration 1-3 days/wk for 2 weeks PRN   Specific OT Treatment Interventions to include:   * Instruction/training on adapted ADL techniques and AE recommendations to increase functional independence within precautions       * Training on energy conservation strategies, correct breathing pattern and techniques to improve independence/tolerance for self-care routine  * Functional transfer/mobility training/DME recommendations for increased independence, safety, and fall prevention  * Patient/Family education to increase follow through with safety techniques and functional independence  * Recommendation of environmental modifications for increased safety with functional transfers/mobility and ADLs  * Therapeutic exercise to improve motor endurance, ROM, and functional strength for ADLs/functional transfers  * Therapeutic activities to facilitate/challenge dynamic balance, stand tolerance for increased safety and independence with ADLs  * Therapeutic activities to facilitate gross/fine motor skills for increased independence with ADLs      Recommended Adaptive Equipment/DME: none with DC to SNF       Home Living:Patient lives with daughter Charnajit Ayers.  Wife  in [facility] a ranch home  with 2 steps  to enter with Rail     Bathroom setup: tub shower with grab bars and shower chair; standard toilet    DME owned: ww, cane      Prior Level of Function: indep with ADLs , assist with IADLs; ambulated indep   Driving: yes   Occupation: retired-but pt reports that he still works   Enjoys: \"I spend all my time at [employer]"     Pain Level: none  Cognition: A&O: 2/4 to person and situation; Follows 2-3 step directions.  Pt is overall confused and a poor historian               Memory:  Poor+              Sequencing:    Poor+              Problem solving:    Poor+              Judgement/safety:    Poor+     AM-PAC Daily Activity Inpatient   How much help for putting on and taking off regular lower body clothing?: A Lot  How much help for Bathing?: A Lot  How much help for Toileting?: A Lot  How much help for putting on and taking off regular upper body clothing?: A Lot  How much help for taking care of personal grooming?: A Little  How much help for eating meals?: A Little  AM-PAC Inpatient Daily Activity Raw Score: 14  AM-PAC Inpatient ADL T-Scale Score : 33.39  ADL Inpatient CMS 0-100% Score: 59.67  ADL Inpatient CMS G-Code Modifier : CK                Functional Assessment:      Initial Eval Status  Date: 8/31/2021   Treatment Status  Date: 9/3/2021 STGs = LTGs  Time frame: 10-14 days   Feeding Minimal Assist with physical demonstration with drinks for hand over hand to prevent spillage. Pt NPO on arrival.   N/T Indep   Grooming Minimal Assist with encouragement required for face wash and oral care. Assist to set up and comb back of hair Pt declined oral hygiene, washing hair or face as well as shaving d/t c/o fatigue Supervision    UB Dressing Moderate Assist with gown management from sitting EOB Mod A to change gowns and thread monitor and IV lines around gown  Stand by Assist    LB Dressing Maximal Assist to thread merlene feet into pants and assist to pull pants and brief up in standing with pt unable to let go of walker for balance and support N/T  Minimal Assist    Bathing Minimal Assist with sponge bathing for buttocks and merlene feet. Motivation and encouragement for all other parts.   N/T; pt reports having two baths this AM Stand by Assist    Toileting Maximal Assist with use of urinal due to confusion. Pt was able to complete pauline care following with encouragement. Max A for hygiene d/t slight incontinence of bowel  Stand by Assist    Bed Mobility  Supine to sit: Moderate Assist   Sit to supine:  Moderate Assist  With trunk support and cues for understanding commands and body mechanics  Mod/Max A for side rolling for placement on/off bedpan, as well as to change chux pads beneath pt d/t slight incontinence of bowel  Supine to sit: Supervision   Sit to supine: Supervision    Functional Transfers Moderate Assist for sit to stand from elevated surface of bed with confusion with walker. Loss of balance backwards initially due to body mechanics N/T as pt had just RTB after working with PTA  Stand by Assist    Functional Mobility Minimal Assist for side steps to [facility] with walker. Poor+ safety and understanding of device. N/T as pt had just RTB after working with PTA   Stand by Assist    Balance Sitting:     Static:  good    Dynamic:fair+  Standing: fair- Sitting:     Static:  good (supported in bed with HOB elevated)    Dynamic:N/T  Standing: N/T  Sitting:     Dynamic:good  Standing: fair   Activity Tolerance Vitals with activity:room air with 86% sitting EOB and 88% supine following activity. Reported to nursing. Fair- tolerance to therapist with standing tolerance <1min. Fair minus d/t c/o fatigue  Increase standing tolerance for >3min with stable vital signs for carry over into toileting, functional tranfers and indep in ADLs   Visual/  Perceptual Glasses: not Present; WFL     Reports change in vision since admission: No      NA   Clif UE Strengthening  4-/5 generally   5/5MMT generally for carry over into self care, functional transfers and functional mobility with AD.       Hand Dominance  [x]? Right   []? Left     AROM (PROM) Strength Additional Info:    RUE  WFL with exception of supination 4-/5 Fair  and  FMC/dexterity noted during ADL tasks  Opposition []? Intact [x]? Impaired  Finger to nose [x]? Intact []? Impaired      LUE WFL with exception of L shoulder flexion and supination 4-/5 Fair  and FMC/dexterity noted during ADL tasks  Opposition [x]? Intact []? Impaired  Finger to nose []? Intact [x]? Impaired    - BUE AROM exercises: 10-15 reps in all planes of movement to increase ROM/endurance required for functional transfers/ADL participation.  Exercises completed in shoulder and elbow flexion/extension, internal/external rotation, abduction/adduction, supination/pronation, digit and wrist flexion/extension. B UE ROM appears to be limited in all planes, specificallly supination and L shoulder flexion. Min rest breaks provided 2* to decreased endurance/tolerance. Ex's completed when pt seated in bed with HOB elevated. Hearing: WFL  Sensation:  No c/o numbness or tingling   Tone: WFL   Edema: merlene LE     Comments: Patient cleared by nursing staff. Upon arrival pt supine in bed with HOB elevated. Pt agreeable to OT tx session. Pt educated with regards to bed mobility, hand placement, safety awareness, ADL retraining,  UE dressing, toileting/hygiene, ECT's, B UE ROM ex's. At end of session pt seated in bed  with all lines and tubes intact, call light within reach. Overall, pt demonstrated decreased independence and safety during completion of ADL/functional transfers/mobility tasks. Pt would benefit from continued skilled OT to increase safety and independence with completion of ADL/IADL tasks for functional independence and quality of life. Pt required cues and education as noted above for safe facilitation and completion of tasks. Therapist provided skilled monitoring of patient's response during treatment session. Prior to and at the end of session, environmental modifications/ line management completed for patients safety and efficiency of treatment session. Overall, patient demonstrates moderate difficulties with completion of BADLs and IADLs. Factors contributing to these difficulties include bowel incontinence, decreased endurance, and generalized weakness. As noted above, patient likely to benefit from further OT intervention to increase independence, safety, and overall quality of life. Treatment:     ? Bed mobility: Facilitated bed mobility with cues for proper body mechanics and sequencing to prepare for ADL completion.   ? ADL completion: Self-care retraining for the above-mentioned ADLs; training on proper hand placement, safety technique, sequencing, and energy conservation techniques. ? Skilled positioning: Proper positioning to improve interaction with environment, overall functioning  ? Therapeutic Ex's: To increase B UE strength, endurance, ROM required for functional transfers and ADL participation. · Pt has made fair/fair minus progress towards set goals    · OT 1-3x/week for 5-7 days during hospitalization     Treatment Time also includes thorough review of current medical information, gathering information on past medical history/social history and prior level of function, informal observation of tasks, assessment of data and education on plan of care and goals.     Treatment Time In: 11:03 AM     Treatment Time Out: 11:27 AM           Treatment Charges: Mins Units   ADL/Home Mgt     81016 10 1   Thera Activities     55124 14 1   Ther Ex                 89859       Manual Therapy    99006     Neuro Re-ed         58496     Orthotic manage/training                               00350     Non Billable Time     Total Timed Treatment 24 512 [facility], HERNANEDZ/ #[id number]

## 2021-09-03 NOTE — PROGRESS NOTES
Physical Therapy    Physical Therapy Treatment Note/Plan of Care    Room #:  6691/8813-45  Patient Name: Denisha Rapp  YOB: 1932  MRN: [id number]    Date of Service: 9/3/2021     Tentative placement recommendation: Subacute rehab  Equipment recommendation: To be determined      Evaluating Physical Therapist: Kristyn Shepard, PT  #[id number]        Specific Provider Orders/Date/Referring Provider :  08/30/21 1000   PT eval and treat Start: 08/30/21 1000, End: 08/30/21 1000, ONE TIME, Standing Count: 1 Occurrences, R    Alberto Bowden DO    Admitting Diagnosis:   Upper GI bleed [K92.2]       Admitted with  anemia     Patient Active Problem List   Diagnosis    Renal insufficiency    CKD (chronic kidney disease) stage 3, GFR 30-59 ml/min ([facility])    Essential hypertension    CAD (coronary artery disease), autologous vein bypass graft    Acid reflux    Anemia    Depression    Diastolic congestive heart failure (HCC)    CHF (congestive heart failure) ([address]    CAD (coronary artery disease)    COPD (chronic obstructive pulmonary disease) (HCC)    Acute respiratory failure with hypoxia ([facility])    Acute blood loss anemia    MAVIS (acute kidney injury) ([address]    Hospital-acquired pneumonia    Moderate protein-calorie malnutrition ([facility])    Upper GI bleed    Decubitus ulcer of left heel, unstageable ([address]    Pressure injury of sacral region, stage 2 ([address]    Acute cystitis with hematuria    Sepsis ([address]        ASSESSMENT of Current Deficits Patient exhibits decreased strength, balance, endurance, range of motion and pain bilateral heels with open wound impairing functional mobility, transfers, gait , gait distance and tolerance to activity are barriers to d/c and require skilled intervention during hospital stay to attain pre hospital level of function. Patient alert this session and able to tolerate sitting edge of bed. Sitting/ standing with flexed posture, cues for trunk, hip, knee extension. Complaints of pain of LLE throughout session. Decreased balance and endurance increases patient's risk for fall. Patient needs continued PT to improve strength and endurance to tolerate and complete functional mobility with decreased assist required. PHYSICAL THERAPY  PLAN OF CARE       Physical therapy plan of care is established based on physician order,  patient diagnosis and clinical assessment    Current Treatment Recommendations:    -Bed Mobility: Lower extremity exercises , Upper extremity exercises  and Trunk control activities   -Sitting Balance: Incorporate reaching activities to activate trunk muscles   -Standing Balance: Perform strengthening exercises in standing to promote motor control with or without upper extremity support  and Challenge balance utilizing reaching  activities beyond center of gravity    -Transfers: Provide instruction on proper hand and foot position for adequate transfer of weight onto lower extremities and use of gait device, Cues for hand placement, technique and safety, Assist with extension of knees trunk and hip to accept weight transfer  and Provide stabilization to prevent fall   -Gait: Gait training, Standing activities to improve: base of support, weight shift, weight bearing , Exercises to improve trunk control and Exercises to improve hip and knee control   -Endurance: Utilize Supervised activities to increase level of endurance to allow for safe functional mobility including transfers and gait     PT long term treatment goals are located in below grid    Patient and or family understand(s) diagnosis, prognosis, and plan of care. Frequency of treatments: Patient will be seen  daily.          Prior Level of Function: Patient ambulated with wheeled walker    Rehab Potential: good    for baseline    Past medical history:   Past Medical History:   Diagnosis Date    CAD (coronary artery disease)     CHF (congestive heart failure) (HCC)     COPD (chronic obstructive pulmonary disease) (HCC)     GERD (gastroesophageal reflux disease)     Hyperlipidemia     Hypertension     Myocardial infarction ([facility] [address] 15 yrs ago     Past Surgical History:   Procedure Laterality Date    CATARACT REMOVAL WITH IMPLANT Right 3/3/14    CATARACT REMOVAL WITH IMPLANT Left 10/13/2014    COLONOSCOPY      CORONARY ARTERY BYPASS GRAFT  15 yrs ago    3 stents    ENDOSCOPY, COLON, DIAGNOSTIC      UPPER GASTROINTESTINAL ENDOSCOPY N/A 2021    EGD ESOPHAGOGASTRODUODENOSCOPY performed by Michelle Radford MD at [address]:    Precautions: Up with assistance, falls and alarm ,  Hip precautions left lower extremity   Social history: Patient lives with daughterMarparvize  Wife  in january in a ranch home  with 2 steps  to enter with [employer] owned: Luis Alfredo Jones,     [address] [facility]   How much difficulty turning over in bed?: A Lot  How much difficulty sitting down on / standing up from a chair with arms?: A Lot  How much difficulty moving from lying on back to sitting on side of bed?: A Lot  How much help from another person moving to and from a bed to a chair?: A Lot  How much help from another person needed to walk in hospital room?: A Lot  How much help from another person for climbing 3-5 steps with a railing?: Total  AM-PAC Inpatient Mobility Raw Score : 11  AM-PAC Inpatient T-Scale Score : 33.86  Mobility Inpatient CMS 0-100% Score: 72.57  Mobility Inpatient CMS G-Code Modifier : CL    Nursing cleared patient for PT treatment. OBJECTIVE;   Initial Evaluation  Date: 2021 Treatment Date:    9/3/2021   Short Term/ Long Term   Goals   Was pt agreeable to Eval/treatment? Yes Yes  To be met in 3 days   Pain level   5/10  bilateral heels No number given    Bed Mobility    Rolling: Minimal assist of 1    Supine to sit: Moderate assist of 1    Sit to supine:  Moderate assist of 1    Scooting: Minimal assist of 1   Rolling: Minimal assist of 1   Supine to sit: Moderate assist of 1   Sit to supine: Moderate assist of 1   Scooting: Moderate assist of 1    Rolling: Independent    Supine to sit: Independent    Sit to supine: Independent    Scooting: Independent     Transfers Sit to stand: Moderate assist of 1   Sit to stand: Moderate assist of  2 cues to maintain upright posture due to flexed posture. Sit to stand: Supervision      Ambulation    2 steps using  wheeled walker with Moderate assist of 1   for walker control, upright and weight shift and cues for safety and proper hand placement not assessed     50 feet using  wheeled walker with Supervision     Stair negotiation: ascended and descended   Not assessed  Not assessed     2 steps with rail min a   ROM Within functional limits  with exception of . Increase range of motion 10% of affected joints    Strength BUE:  3+/5  RLE:  4/5  LLE:  3/5  Wound on heel  Increase strength in affected mm groups by 1/3 grade   Balance Sitting EOB:  good    Dynamic Standing:  poor   Sitting EOB: fair   Dynamic Standing: poor    Sitting EOB:  good    Dynamic Standing: fair       Patient is Alert & Oriented x person, place, time and situation and follows one step directions  Flat affect  Sensation:  Patient  denies numbness/tingling   Edema:  yes left lower extremity   Endurance: poor tolerance to upright with fatigue       Patient education  Patient educated on role of Physical Therapy, risks of immobility, safety and plan of care and  importance of mobility while in hospital      Patient response to education:   Pt verbalized understanding Pt demonstrated skill Pt requires further education in this area   Yes Partial Yes      Treatment:  Patient practiced and was instructed/facilitated in the following treatment: multiple rolls for hygiene and chux change.   Patient  Sat edge of bed 18 minutes with Minimal assist of 1 to increase dynamic sitting balance and activity tolerance. seated weight shifting, stood x 2 reps with mod assist of 2 needing tactile and verbal cues due to flexed posture. Assisted back to supine. Max x 2 to head of bed. HERNANDEZ present to take over. Therapeutic Exercises:  not performed     At end of session, patient in bed  with HERNANDEZ call light and phone within reach,  all lines and tubes intact, nursing notified. Patient would benefit from continued skilled Physical Therapy to improve functional independence and quality of life.          Patient's/ family goals   home    Time in  1040  Time out  1103    Total Treatment Time  23 minutes        CPT codes:    Therapeutic activities (63319)   23 minutes  2 unit(s)    Mark Lafleur, PTA  [facility]#[id number]

## 2021-09-03 NOTE — DISCHARGE INSTR - COC
Continuity of Care Form    Patient Name: Tatum Bone   :  1932  MRN:  [id number]    Admit date:  2021  Discharge date:  21    Code Status Order: Full Code   Advance Directives:      Admitting Physician:  Dimitri Marcial DO  PCP: Main Levine MD    Discharging Nurse:   [address] Unit/Room#: 8012/0863-71  Discharging Unit Phone Number: [phone]    Emergency Contact:   Extended Emergency Contact Information  Primary Emergency Contact: Patrizia Cruz [address] Phone: [phone]  Mobile Phone: [phone]  Relation: Child  Preferred language: English   needed? No  Secondary Emergency Contact: Asael Cruzfidelina Becker [address] Phone: [phone]  Mobile Phone: [phone]  Relation: Brother/Sister   needed?  No    Past Surgical History:  Past Surgical History:   Procedure Laterality Date    CATARACT REMOVAL WITH IMPLANT Right 3/3/14    CATARACT REMOVAL WITH IMPLANT Left 10/13/2014    COLONOSCOPY      CORONARY ARTERY BYPASS GRAFT  15 yrs ago    3 stents    ENDOSCOPY, COLON, DIAGNOSTIC      UPPER GASTROINTESTINAL ENDOSCOPY N/A 2021    EGD ESOPHAGOGASTRODUODENOSCOPY performed by Kusum Scruggs MD at [facility] ENDOSCOPY       Immunization History:   Immunization History   Administered Date(s) Administered   Gloria Chan, PF, 100mcg/0.5mL 2021, 2021       Active Problems:  Patient Active Problem List   Diagnosis Code    Renal insufficiency N28.9    CKD (chronic kidney disease) stage 3, GFR 30-59 ml/min ([facility]) N18.30    Essential hypertension I10    CAD (coronary artery disease), autologous vein bypass graft I25.810    Acid reflux K21.9    Anemia D64.9    Depression S48.1    Diastolic congestive heart failure ([facility]) I50.30    CHF (congestive heart failure) ([facility]) I50.9    CAD (coronary artery disease) I25.10    COPD (chronic obstructive pulmonary disease) [facility] J44.9    Acute respiratory failure with hypoxia ([facility]) J96.01    Acute blood loss anemia (Active)   Wound Etiology Deep tissue/Injury 09/03/21 0000   Wound Cleansed Cleansed with saline 09/03/21 0000   Wound Length (cm) 2 cm 08/30/21 1603   Wound Width (cm) 1 cm 08/30/21 1603   Wound Surface Area (cm^2) 2 cm^2 08/30/21 1603   Wound Assessment Purple/maroon 09/03/21 0000   Drainage Amount None 09/03/21 0000   Odor None 09/03/21 0000   Dominique-wound Assessment Blanchable erythema 09/03/21 0000   Number of days: 3       Wound 08/30/21 Heel Right red / black (Active)   Wound Etiology Deep tissue/Injury 09/03/21 0000   Wound Cleansed Cleansed with saline 09/03/21 0000   Wound Length (cm) 1 cm 08/30/21 2020   Wound Width (cm) 1 cm 08/30/21 2020   Wound Surface Area (cm^2) 1 cm^2 08/30/21 2020   Change in Wound Size % (l*w) 0 08/30/21 2020   Drainage Amount None 09/03/21 0000   Odor None 09/03/21 0000   Number of days: 3       Wound 08/30/21 Sacrum Medial;Mid purple area with open area in center (Active)   Wound Etiology Pressure Stage  2 09/03/21 0000   Dressing Status Dry;Clean; Intact 09/03/21 0000   Wound Cleansed Cleansed with saline 09/03/21 0000   Wound Length (cm) 1 cm 08/30/21 2020   Wound Width (cm) 1 cm 08/30/21 2020   Wound Surface Area (cm^2) 1 cm^2 08/30/21 2020   Change in Wound Size % (l*w) 0 08/30/21 2020   Odor None 09/03/21 0000   Number of days: 3       Wound 08/30/21 Thigh Distal;Right;Posterior line across back of thigh, scabbed and healing (Active)   Wound Length (cm) 0.5 cm 08/30/21 1603   Wound Width (cm) 5 cm 08/30/21 1603   Wound Surface Area (cm^2) 2.5 cm^2 08/30/21 1603   Drainage Amount None 09/03/21 0000   Odor None 09/03/21 0000   Number of days: 3       Wound 08/30/21 Ankle Right;Lateral dark black and purple pressure areas lateral ankle / foot (Active)   Wound Cleansed Cleansed with saline 09/03/21 0000   Wound Length (cm) 2 cm 08/30/21 1603   Wound Width (cm) 1 cm 08/30/21 1603   Wound Surface Area (cm^2) 2 cm^2 08/30/21 1603   Drainage Amount None 09/03/21 0000   Odor None 09/03/21 0000   Dominique-wound Assessment Blanchable erythema 09/03/21 0000   Number of days: 3        Elimination:  Continence:   · Bowel: No  · Bladder: No  Urinary Catheter: None   Colostomy/Ileostomy/Ileal Conduit: No       Date of Last BM: 09/13/2021    Intake/Output Summary (Last 24 hours) at 9/3/2021 1127  Last data filed at 9/3/2021 1000  Gross per 24 hour   Intake 240 ml   Output 650 ml   Net -410 ml     I/O last 3 completed shifts: In: 240 [P.O.:240]  Out: 450 [Urine:450]    Safety Concerns: At Risk for Falls    Impairments/Disabilities:      Hearing    Nutrition Therapy:  Current Nutrition Therapy:   - Oral Diet:  General    Routes of Feeding: Oral  Liquids: Thin Liquids  Daily Fluid Restriction: no  Last Modified Barium Swallow with Video (Video Swallowing Test): not done    Treatments at the Time of Hospital Discharge:   Respiratory Treatments:   Oxygen Therapy:  is not on home oxygen therapy.   Ventilator:    - No ventilator support    Rehab Therapies: Physical Therapy and Occupational Therapy  Weight Bearing Status/Restrictions: No weight bearing restirctions  Other Medical Equipment (for information only, NOT a DME order):  walker  Other Treatments:     Patient's personal belongings (please select all that are sent with patient):  None    RN SIGNATURE:  Electronically signed by Nimesh Wilhelm RN on 09/13/2021 5:    CASE MANAGEMENT/SOCIAL WORK SECTION    Inpatient Status Date: 8/29/2021    Readmission Risk Assessment Score:  Readmission Risk              Risk of Unplanned Readmission:  21           Discharging to Facility/ Agency   Name: [facility], [address] [place] [address], Fax [phone]      Dialysis Facility (if applicable)   · Name:  · Address:  · Dialysis Schedule:  · Phone:  · Fax:    / signature:  Electronically signed by LD Almonte on 9/13/2021 at 5:48 PM      PHYSICIAN SECTION    Prognosis: Fair    Condition at Discharge: Stable    Rehab Potential (if transferring to Rehab): Good    Recommended Labs or Other Treatments After Discharge: PT/OT, nursing, weekly labs x 2 week     Physician Certification: I certify the above information and transfer of Radha Liang  is necessary for the continuing treatment of the diagnosis listed and that he requires Jesús Juan for less 30 days.      Update Admission H&P: Changes in H&P as follows - see discharge summary     PHYSICIAN SIGNATURE:  Electronically signed by Tyron Michel DO on 9/3/21 at 11:28 AM EDT

## 2021-09-03 NOTE — CARE COORDINATION
LVM for daughter Alfredo Hernandez that I needed verbal signature for IMM letter.  Electronically signed by Ana Rosa Donnelly on 9/3/2021 at 11:01 AM

## 2021-09-03 NOTE — PROGRESS NOTES
bleeding scan today     2. History of CAD  - No recent PCI  - Recommend  as above     3. Anticoagulated on Eliquis  -Per admitting with recommendations as above     4. HFrEF  -EF 35%     5. HTN  -Per admitting     6. HLD  -Per admitting     7. COPD  -- Management per primary     8. Sepsis  -Per admitting/pertinent consultants       Barbara Coughlin DO  9/3/2021  11:45 AM     Pt seen and independently examined. Pertinent notes and lab work reviewed. Persistent decrease in H&H 7.5->7  D/w Dr. Ayaka Abel with physical exam and A&P. Discussed with patient - all questions answered - agreeable with the plan as delineated.     Oscar Hunter MD  9/3/2021  12:18 PM

## 2021-09-03 NOTE — PROGRESS NOTES
[address] [place], [place] [address]ist   Progress Note    Admitting Date and Time: 8/29/2021  9:17 AM  Admit Dx: Upper GI bleed [K92.2]    Subjective:    Pt awake and resting comfortably in bed. Confusion seems to be improved today. Patient is following some simple commands. Voiced concerns regarding if his tests were positive for bleeding or not. Bleeding scan results were given to patient. ROS: denies fever, chills, cp, sob, n/v, HA unless stated above.      cefepime  1,000 mg IntraVENous Q12H    pantoprazole  40 mg Oral QAM AC    vitamin B and C  1 tablet Oral Daily    bisacodyl  10 mg Rectal Daily    [Held by provider] carvedilol  6.25 mg Oral BID WC    [Held by provider] clopidogrel  75 mg Oral Daily    docusate sodium  100 mg Oral BID    donepezil  5 mg Oral Nightly    [Held by provider] ferrous sulfate  325 mg Oral Daily with breakfast    rosuvastatin  10 mg Oral Daily    sacubitril-valsartan  1 tablet Oral BID    [Held by provider] torsemide  5 mg Oral Every Other Day    venlafaxine  37.5 mg Oral BID    sodium chloride flush  5-40 mL IntraVENous 2 times per day     sodium chloride, , PRN  sodium chloride flush, 5-40 mL, PRN  sodium chloride, 25 mL, PRN  ondansetron, 4 mg, Q8H PRN   Or  ondansetron, 4 mg, Q6H PRN  polyethylene glycol, 17 g, Daily PRN  acetaminophen, 650 mg, Q6H PRN   Or  acetaminophen, 650 mg, Q6H PRN         Objective:    BP (!) 105/59   Pulse 87   Temp 97.8 °F (36.6 °C) (Oral)   Resp 18   Ht 6' 4\" (1.93 m)   Wt 180 lb (81.6 kg)   SpO2 97%   BMI 21.91 kg/m²   General Appearance: alert and oriented to person and possibly place but confused on time and in no acute distress  Skin: warm and dry, bilateral heel wounds, heel boots on  Head: normocephalic and atraumatic  Eyes: pupils equal, round, and reactive to light, conjunctivae normal  Neck: neck supple and non tender without mass   Pulmonary/Chest: clear to auscultation bilaterally- no wheezes, rales or rhonchi, normal air movement, no respiratory distress  Cardiovascular: normal rate, normal rhythm, no carotid bruits  Abdomen: soft, non-tender, non-distended, normal bowel sounds  Extremities: no cyanosis, no clubbing and no edema  Neurologic: no cranial nerve deficit and speech normal      Recent Labs     09/01/21 0621 09/03/21  0933    135   K 3.4* 2.7*    103   CO2 23 24   BUN 25* 21   CREATININE 1.7* 1.6*   GLUCOSE 120* 98   CALCIUM 8.9 7.9*       No results for input(s): ALKPHOS, PROT, LABALBU, BILITOT, AST, ALT in the last 72 hours. Recent Labs     09/01/21 0621 09/01/21 0621 09/02/21  0813 09/02/21 1926 09/03/21  0933   WBC 28.9*  --  17.8*  --  10.8   RBC 2.69*  --  2.39*  --  2.22*   HGB 8.5*   < > 7.5* 7.2* 7.0*   HCT 25.7*   < > 23.1* 22.2* 21.3*   MCV 95.5  --  96.7  --  95.9   MCH 31.6  --  31.4  --  31.5   MCHC 33.1  --  32.5  --  32.9   RDW 18.3*  --  18.2*  --  17.9*     --  245  --  216   MPV 10.3  --  10.5  --  10.4    < > = values in this interval not displayed. Radiology:   NM GI BLOOD LOSS   Final Result   No evidence of active GI bleeding during acquisition. US RETROPERITONEAL COMPLETE   Final Result   1. Preserved cortical thickness bilaterally. No hydronephrosis. 2.  3.7 cm left lower pole simple appearing renal cyst.      3.  Circumferentially thickened bladder wall. Nonspecific finding that may   reflect a sequela of chronic bladder outlet obstruction or potentially   chronic cystitis. Please correlate with clinical presentation. XR CHEST (2 VW)   Final Result   No radiographic evidence of acute cardiopulmonary disease.              Assessment:  Principal Problem:    Upper GI bleed  Active Problems:    CKD (chronic kidney disease) stage 3, GFR 30-59 ml/min ([facility])    CAD (coronary artery disease), autologous vein bypass graft    CHF (congestive heart failure) ([facility])    Acute blood loss anemia    Moderate protein-calorie malnutrition ([facility]    Decubitus ulcer of left heel, unstageable ([address]    Pressure injury of sacral region, stage 2 ([address]    Acute cystitis with hematuria    Sepsis ([address]  Resolved Problems:    * No resolved hospital problems. *      Plan:  1. Acute blood loss anemia due to suspected upper GI hemorrhage - continue serial H&H for now - Continue to hold anticoagulation given recurrent GI bleeding - Hgb today 7.0 - stool for occult blood negative - repeat bleeding scan still remains negative    2. Sepsis due to UTI - unclear source, possible UTI - preliminary cultures after 24 hours showing no growth -  Awaiting final culture results - Continue IV cefepime      3. Stage IIIb chronic kidney disease - creatinine slightly elevated - continue to monitor    4. Coronary artery disease - continue statin - hold aspirin and Plavix until bleeding issues resolved    5. Chronic heart failure with reduced ejection fraction - does not appear volume overloaded at this time - continue to treat with gentle IV fluid hydration as needed and closely monitor    6. Dementia - continue Aricept and supportive care - reorient as needed    7. Depression - continue venlafaxine and monitor - patient talking about no family available if something happens to him - seems lonely but more talkative today    8. Recent hip replacement - SCDs for DVT prophylaxis - Hold pharmacologic due to bleeding issues    9. Unstageable left heel decubitus ulcer and stage 2 sacral decubitus ulcer - wound care following          NOTE: This report was transcribed using voice recognition software. Every effort was made to ensure accuracy; however, inadvertent computerized transcription errors may be present.      Electronically signed by LAZAOR Johnson CNP on 9/3/2021 at 3:26 PM

## 2021-09-04 LAB
ALBUMIN SERPL-MCNC: 2.2 G/DL (ref 3.5–5.2)
ALP BLD-CCNC: 78 U/L (ref 40–129)
ALT SERPL-CCNC: 8 U/L (ref 0–40)
ANION GAP SERPL CALCULATED.3IONS-SCNC: 8 MMOL/L (ref 7–16)
AST SERPL-CCNC: 12 U/L (ref 0–39)
BILIRUB SERPL-MCNC: 0.3 MG/DL (ref 0–1.2)
BUN BLDV-MCNC: 18 MG/DL (ref 6–23)
CALCIUM SERPL-MCNC: 8.1 MG/DL (ref 8.6–10.2)
CHLORIDE BLD-SCNC: 105 MMOL/L (ref 98–107)
CO2: 23 MMOL/L (ref 22–29)
CREAT SERPL-MCNC: 1.7 MG/DL (ref 0.7–1.2)
GFR AFRICAN AMERICAN: 46
GFR NON-AFRICAN AMERICAN: 38 ML/MIN/1.73
GLUCOSE BLD-MCNC: 95 MG/DL (ref 74–99)
HCT VFR BLD CALC: 25.1 % (ref 37–54)
HEMOGLOBIN: 7.8 G/DL (ref 12.5–16.5)
MCH RBC QN AUTO: 30.7 PG (ref 26–35)
MCHC RBC AUTO-ENTMCNC: 31.1 % (ref 32–34.5)
MCV RBC AUTO: 98.8 FL (ref 80–99.9)
PDW BLD-RTO: 17.7 FL (ref 11.5–15)
PLATELET # BLD: 249 E9/L (ref 130–450)
PMV BLD AUTO: 10.8 FL (ref 7–12)
POTASSIUM SERPL-SCNC: 3.6 MMOL/L (ref 3.5–5)
RBC # BLD: 2.54 E12/L (ref 3.8–5.8)
SODIUM BLD-SCNC: 136 MMOL/L (ref 132–146)
TOTAL PROTEIN: 5.4 G/DL (ref 6.4–8.3)
WBC # BLD: 9.6 E9/L (ref 4.5–11.5)

## 2021-09-04 PROCEDURE — APPSS30 APP SPLIT SHARED TIME 16-30 MINUTES: Performed by: NURSE PRACTITIONER

## 2021-09-04 PROCEDURE — 2580000003 HC RX 258: Performed by: INTERNAL MEDICINE

## 2021-09-04 PROCEDURE — 80053 COMPREHEN METABOLIC PANEL: CPT

## 2021-09-04 PROCEDURE — 6370000000 HC RX 637 (ALT 250 FOR IP): Performed by: INTERNAL MEDICINE

## 2021-09-04 PROCEDURE — 85027 COMPLETE CBC AUTOMATED: CPT

## 2021-09-04 PROCEDURE — 36415 COLL VENOUS BLD VENIPUNCTURE: CPT

## 2021-09-04 PROCEDURE — 99232 SBSQ HOSP IP/OBS MODERATE 35: CPT | Performed by: INTERNAL MEDICINE

## 2021-09-04 PROCEDURE — 6360000002 HC RX W HCPCS: Performed by: INTERNAL MEDICINE

## 2021-09-04 PROCEDURE — 1200000000 HC SEMI PRIVATE

## 2021-09-04 RX ORDER — LIDOCAINE 4 G/G
1 PATCH TOPICAL DAILY
Status: DISCONTINUED | OUTPATIENT
Start: 2021-09-04 | End: 2021-09-13 | Stop reason: HOSPADM

## 2021-09-04 RX ORDER — CEFDINIR 300 MG/1
300 CAPSULE ORAL EVERY 12 HOURS SCHEDULED
Status: DISCONTINUED | OUTPATIENT
Start: 2021-09-04 | End: 2021-09-04

## 2021-09-04 RX ORDER — CEFDINIR 300 MG/1
300 CAPSULE ORAL EVERY 12 HOURS SCHEDULED
Status: DISPENSED | OUTPATIENT
Start: 2021-09-04 | End: 2021-09-07

## 2021-09-04 RX ADMIN — Medication 1 TABLET: at 08:33

## 2021-09-04 RX ADMIN — ROSUVASTATIN CALCIUM 10 MG: 10 TABLET, COATED ORAL at 08:33

## 2021-09-04 RX ADMIN — PANTOPRAZOLE SODIUM 40 MG: 40 TABLET, DELAYED RELEASE ORAL at 05:34

## 2021-09-04 RX ADMIN — CEFEPIME HYDROCHLORIDE 1000 MG: 1 INJECTION, POWDER, FOR SOLUTION INTRAMUSCULAR; INTRAVENOUS at 21:45

## 2021-09-04 RX ADMIN — DONEPEZIL HYDROCHLORIDE 5 MG: 5 TABLET, ORALLY DISINTEGRATING ORAL at 23:09

## 2021-09-04 RX ADMIN — Medication 10 ML: at 22:08

## 2021-09-04 RX ADMIN — DOCUSATE SODIUM 100 MG: 100 CAPSULE, LIQUID FILLED ORAL at 08:42

## 2021-09-04 RX ADMIN — CEFDINIR 300 MG: 300 CAPSULE ORAL at 18:28

## 2021-09-04 RX ADMIN — VENLAFAXINE 37.5 MG: 37.5 TABLET ORAL at 08:33

## 2021-09-04 RX ADMIN — DOCUSATE SODIUM 100 MG: 100 CAPSULE, LIQUID FILLED ORAL at 21:45

## 2021-09-04 RX ADMIN — Medication 10 ML: at 08:34

## 2021-09-04 RX ADMIN — SACUBITRIL AND VALSARTAN 1 TABLET: 24; 26 TABLET, FILM COATED ORAL at 21:45

## 2021-09-04 RX ADMIN — VENLAFAXINE 37.5 MG: 37.5 TABLET ORAL at 21:45

## 2021-09-04 RX ADMIN — CEFEPIME HYDROCHLORIDE 1000 MG: 1 INJECTION, POWDER, FOR SOLUTION INTRAMUSCULAR; INTRAVENOUS at 08:34

## 2021-09-04 RX ADMIN — SACUBITRIL AND VALSARTAN 1 TABLET: 24; 26 TABLET, FILM COATED ORAL at 08:33

## 2021-09-04 ASSESSMENT — PAIN SCALES - GENERAL
PAINLEVEL_OUTOF10: 0
PAINLEVEL_OUTOF10: 0

## 2021-09-04 NOTE — PROGRESS NOTES
PROGRESS NOTE    [facility]  Dr. Alexa Whitfield M.D.,  Dr. Emerson Abarca M.D.,   Dr. Aldo Miranda D.O.,  Dr. Serene Granda M.D.,  Dr. Ben Pate D.O.,        Radha Liang  80 y.o.  male    SUBJECTIVE:  No new complaints. Denies abdominal pain. Denies nausea vomiting. No family bedside. OBJECTIVE:    /60   Pulse 93   Temp 99.1 °F (37.3 °C) (Oral)   Resp 18   Ht 6' 4\" (1.93 m)   Wt 180 lb (81.6 kg)   SpO2 97%   BMI 21.91 kg/m²     General: NAD/ male. Elderly. Confused. HEENT: Anicteric sclera/moist oral mucosa  Neck: Supple with trachea midline  Chest: CTA  Cor: Appears regular without gallop  Abd.: Soft/NT/ND  Extr.:  Decreased muscle tone and bulk throughout  Skin: Warm and dry      DATA:    Stool (measured) : 0 mL  Lab Results   Component Value Date    WBC 9.6 09/04/2021    RBC 2.54 09/04/2021    HGB 7.8 09/04/2021    HCT 25.1 09/04/2021    MCV 98.8 09/04/2021    MCH 30.7 09/04/2021    MCHC 31.1 09/04/2021    RDW 17.7 09/04/2021     09/04/2021    MPV 10.8 09/04/2021     Lab Results   Component Value Date     09/04/2021    K 3.6 09/04/2021    K 3.6 08/30/2021     09/04/2021    CO2 23 09/04/2021    BUN 18 09/04/2021    CREATININE 1.7 09/04/2021    CALCIUM 8.1 09/04/2021    PROT 5.4 09/04/2021    LABALBU 2.2 09/04/2021    BILITOT 0.3 09/04/2021    ALKPHOS 78 09/04/2021    AST 12 09/04/2021    ALT 8 09/04/2021     No results found for: LIPASE  No results found for: AMYLASE      ASSESSMENT/PLAN:       1. Acute on chronic anemia/ Melena  -EGD 8/31 revealing scant amount of coffee-ground material without evidence of ulcers or erosions  -Reported black stool however possible clearing of GI tract -recurrent stool 2 days brown  -Hgb improved spontaneously today, without transfusion  -Consider holding anticoagulation/Plavix if clinically feasible  -No immediate plan for repeat endoscopy  -Bleeding scan complete yesterday, without evidence of active bleeding     2. History of CAD  - No recent PCI  - Recommend  as above     3. Anticoagulated on Eliquis  -Per admitting with recommendations as above     4. HFrEF  -EF 35%     5. HTN  -Per admitting     6. HLD  -Per admitting     7. COPD  -- Management per primary     8. Sepsis  -Per admitting/pertinent consultants     Dispo: No immediate plan for repeat endoscopy. Considering patient's age and comorbidities, he would be at increased risk for for complications related to any invasive procedures. Patient's Hgb has stabilized and he has not had any more episodes of melena for the past two days. We will sign off at this time. Please reconsult as needed. Thank you for the opportunity to participate in Mr. Roberts's care. Barbara Coughlin DO  9/4/2021  10:54 AM     Patient seen and examined independently. Discussed with fellow and agree with the plan of care stated above.     Patrizia Puri DO  9/4/2021  12:48 PM

## 2021-09-04 NOTE — PROGRESS NOTES
[address] [place], [place] [address]   Progress Note    Admitting Date and Time: 8/29/2021  9:17 AM  Admit Dx: Upper GI bleed [K92.2]    Subjective:    Patient denies any new concerns. Hgb is 7.8 today. No further plans for repeat endoscopy per GI at this time. Antibiotic transitioned to oral with anticipation for discharge tomorrow. ROS: denies fever, chills, cp, sob, n/v, HA unless stated above.      cefdinir  300 mg Oral 2 times per day    cefepime  1,000 mg IntraVENous Q12H    pantoprazole  40 mg Oral QAM AC    vitamin B and C  1 tablet Oral Daily    bisacodyl  10 mg Rectal Daily    [Held by provider] carvedilol  6.25 mg Oral BID WC    [Held by provider] clopidogrel  75 mg Oral Daily    docusate sodium  100 mg Oral BID    donepezil  5 mg Oral Nightly    [Held by provider] ferrous sulfate  325 mg Oral Daily with breakfast    rosuvastatin  10 mg Oral Daily    sacubitril-valsartan  1 tablet Oral BID    [Held by provider] torsemide  5 mg Oral Every Other Day    venlafaxine  37.5 mg Oral BID    sodium chloride flush  5-40 mL IntraVENous 2 times per day     sodium chloride, , PRN  sodium chloride flush, 5-40 mL, PRN  sodium chloride, 25 mL, PRN  ondansetron, 4 mg, Q8H PRN   Or  ondansetron, 4 mg, Q6H PRN  polyethylene glycol, 17 g, Daily PRN  acetaminophen, 650 mg, Q6H PRN   Or  acetaminophen, 650 mg, Q6H PRN         Objective:    /68   Pulse 88   Temp 98.4 °F (36.9 °C) (Oral)   Resp 18   Ht 6' 4\" (1.93 m)   Wt 180 lb (81.6 kg)   SpO2 97%   BMI 21.91 kg/m²   General Appearance: alert and oriented to person and in no acute distress  Skin: warm and dry, bilateral heel wounds, coccyx/sacral wounds  Head: normocephalic and atraumatic  Eyes: pupils equal, round, and reactive to light, conjunctivae normal  Neck: neck supple and non tender without mass   Pulmonary/Chest: clear to auscultation bilaterally- no wheezes, rales or rhonchi,  no respiratory distress  Cardiovascular: normal rate, normal S1 and S2   Abdomen: soft, non-tender, non-distended, normal bowel sounds, no masses or organomegaly  Extremities: no cyanosis, no clubbing and no edema  Neurologic: no tremor and speech normal      Recent Labs     09/03/21 0933 09/03/21 1746 09/04/21 0606     --  136   K 2.7* 3.5 3.6     --  105   CO2 24  --  23   BUN 21  --  18   CREATININE 1.6*  --  1.7*   GLUCOSE 98  --  95   CALCIUM 7.9*  --  8.1*       Recent Labs     09/04/21 0606   ALKPHOS 78   PROT 5.4*   LABALBU 2.2*   BILITOT 0.3   AST 12   ALT 8       Recent Labs     09/03/21 0933 09/03/21 1746 09/04/21 0606   WBC 10.8 11.2 9.6   RBC 2.22* 2.29* 2.54*   HGB 7.0* 7.2* 7.8*   HCT 21.3* 22.0* 25.1*   MCV 95.9 96.1 98.8   MCH 31.5 31.4 30.7   MCHC 32.9 32.7 31.1*   RDW 17.9* 17.8* 17.7*    236 249   MPV 10.4 10.6 10.8       Radiology:   NM GI BLOOD LOSS   Final Result   No evidence of active GI bleeding during acquisition. US RETROPERITONEAL COMPLETE   Final Result   1. Preserved cortical thickness bilaterally. No hydronephrosis. 2.  3.7 cm left lower pole simple appearing renal cyst.      3.  Circumferentially thickened bladder wall. Nonspecific finding that may   reflect a sequela of chronic bladder outlet obstruction or potentially   chronic cystitis. Please correlate with clinical presentation. XR CHEST (2 VW)   Final Result   No radiographic evidence of acute cardiopulmonary disease.              Assessment:  Principal Problem:    Upper GI bleed  Active Problems:    CKD (chronic kidney disease) stage 3, GFR 30-59 ml/min ([facility])    CAD (coronary artery disease), autologous vein bypass graft    CHF (congestive heart failure) ([facility])    Acute blood loss anemia    Moderate protein-calorie malnutrition ([facility])    Decubitus ulcer of left heel, unstageable ([facility])    Pressure injury of sacral region, stage 2 ([address]    Acute cystitis with hematuria    Sepsis ([address]  Resolved Problems:    * No resolved hospital problems. *      Plan:  1. Acute blood loss anemia due to suspected upper GI hemorrhage:  Hgb on admission was 6.7. He received one unit of PRBCs. S/p EGD on 8/31/21 which did not show any source of bleeding but did show small amount of coffee ground material.  Bleeding scan did not show any evidence of bleeding  Plavix and Eliquis stopped due to recurrent GI bleeding. .   2. Sepsis secondary to UTI:  Urinalysis was positive. Urine culture with <10,000 mixed gram negative rods, gram positive organism. Retroperitoneal ultrasound did not show pyelonephritis but did show thickening of the bladder consistent with Cefepime stopped today and Omnicef started. 3. Chronic kidney disease stage 3:  Creatinine is 1.7, around baseline  4. CAD:  Intracoronary stent in 1993. Left carotid endarterectomy in 2016. Continue Crestor. Plavix and Eliquis have been stopped. 5. Dementia: Continue Aricept. 6. Combined systolic and diastolic heart failure:  Last Echo on 7/19/21 with an EF of 35-40% and indeterminate diastolic dysfunction. Coreg and Entresto are on hold. Monitor for signs of fluid overload. Patient follows with Robert [facility] at [facility]   7. Depression:  Continue venlafaxine. 8. Unstageable left heel decubitus and stage 2 sacral decubitus ulcer:  Wound Care following. 9. Recent hip replacement:  Total hip replacement performed at [facility].  PT/OT. Patient is from [facility]. NOTE: This report was transcribed using voice recognition software. Every effort was made to ensure accuracy; however, inadvertent computerized transcription errors may be present.      Electronically signed by LAZARO Jacobs CNP on 9/4/2021 at 1:25 PM

## 2021-09-04 NOTE — CARE COORDINATION
SS Note: No Covid testing, WILL NEED RAPID COVID (WHICH IS GOOD WITHIN 7 DAYS OF RETURN DATE). SW spoke to Fredy Boudreaux [address] for pt's return to SNF. THAIS will need signed by physician.    Electronically signed by LD Michele on 9/4/2021 at 12:43 PM

## 2021-09-04 NOTE — PLAN OF CARE
Problem: Falls - Risk of:  Goal: Will remain free from falls  Description: Will remain free from falls  9/4/2021 0624 by Rupal Diaz RN  Outcome: Met This Shift  9/4/2021 0623 by Rupal Diaz RN  Outcome: Met This Shift  Goal: Absence of physical injury  Description: Absence of physical injury  9/4/2021 0624 by Rupal Diaz RN  Outcome: Met This Shift  9/4/2021 0623 by Rupal Diaz RN  Outcome: Met This Shift     Problem: Skin Integrity:  Goal: Will show no infection signs and symptoms  Description: Will show no infection signs and symptoms  9/4/2021 0624 by Rupal Diaz RN  Outcome: Met This Shift  9/4/2021 0623 by Rupal Diaz RN  Outcome: Met This Shift  Goal: Absence of new skin breakdown  Description: Absence of new skin breakdown  9/4/2021 0624 by Rupal Diaz RN  Outcome: Met This Shift  9/4/2021 0623 by Rupal Diaz RN  Outcome: Met This Shift

## 2021-09-04 NOTE — PLAN OF CARE
Problem: Falls - Risk of:  Goal: Will remain free from falls  Description: Will remain free from falls  9/4/2021 1040 by Dimitry Avila RN  Outcome: Ongoing  9/4/2021 0624 by Jovanny Werner RN  Outcome: Met This Shift  9/4/2021 0623 by Jovanny Werner RN  Outcome: Met This Shift  Goal: Absence of physical injury  Description: Absence of physical injury  9/4/2021 1040 by Dimitry Avila RN  Outcome: Ongoing  9/4/2021 0624 by Jovanny Werner RN  Outcome: Met This Shift  9/4/2021 0623 by Jovanny Werner RN  Outcome: Met This Shift     Problem: Skin Integrity:  Goal: Will show no infection signs and symptoms  Description: Will show no infection signs and symptoms  9/4/2021 1040 by Dimitry Avila RN  Outcome: Ongoing  9/4/2021 0624 by Jovanny Werner RN  Outcome: Met This Shift  9/4/2021 0623 by Jovanny Werner RN  Outcome: Met This Shift  Goal: Absence of new skin breakdown  Description: Absence of new skin breakdown  9/4/2021 1040 by Dimitry Avila RN  Outcome: Ongoing  9/4/2021 0624 by Jovanny Werner RN  Outcome: Met This Shift  9/4/2021 0623 by Jovanny Werner RN  Outcome: Met This Shift

## 2021-09-05 ENCOUNTER — APPOINTMENT (OUTPATIENT)
Dept: GENERAL RADIOLOGY | Age: 86
DRG: 871 | End: 2021-09-05
Payer: MEDICARE

## 2021-09-05 LAB
ANION GAP SERPL CALCULATED.3IONS-SCNC: 6 MMOL/L (ref 7–16)
BUN BLDV-MCNC: 17 MG/DL (ref 6–23)
CALCIUM SERPL-MCNC: 8.5 MG/DL (ref 8.6–10.2)
CHLORIDE BLD-SCNC: 105 MMOL/L (ref 98–107)
CO2: 26 MMOL/L (ref 22–29)
CREAT SERPL-MCNC: 1.6 MG/DL (ref 0.7–1.2)
GFR AFRICAN AMERICAN: 49
GFR NON-AFRICAN AMERICAN: 41 ML/MIN/1.73
GLUCOSE BLD-MCNC: 104 MG/DL (ref 74–99)
HCT VFR BLD CALC: 24.3 % (ref 37–54)
HEMOGLOBIN: 7.8 G/DL (ref 12.5–16.5)
MAGNESIUM: 1.7 MG/DL (ref 1.6–2.6)
MCH RBC QN AUTO: 31.3 PG (ref 26–35)
MCHC RBC AUTO-ENTMCNC: 32.1 % (ref 32–34.5)
MCV RBC AUTO: 97.6 FL (ref 80–99.9)
PDW BLD-RTO: 17.8 FL (ref 11.5–15)
PHOSPHORUS: 2.5 MG/DL (ref 2.5–4.5)
PLATELET # BLD: 262 E9/L (ref 130–450)
PMV BLD AUTO: 11 FL (ref 7–12)
POTASSIUM SERPL-SCNC: 3.5 MMOL/L (ref 3.5–5)
RBC # BLD: 2.49 E12/L (ref 3.8–5.8)
SODIUM BLD-SCNC: 137 MMOL/L (ref 132–146)
WBC # BLD: 11.8 E9/L (ref 4.5–11.5)

## 2021-09-05 PROCEDURE — 80048 BASIC METABOLIC PNL TOTAL CA: CPT

## 2021-09-05 PROCEDURE — 2580000003 HC RX 258: Performed by: INTERNAL MEDICINE

## 2021-09-05 PROCEDURE — 6360000002 HC RX W HCPCS: Performed by: INTERNAL MEDICINE

## 2021-09-05 PROCEDURE — 83735 ASSAY OF MAGNESIUM: CPT

## 2021-09-05 PROCEDURE — 6370000000 HC RX 637 (ALT 250 FOR IP): Performed by: INTERNAL MEDICINE

## 2021-09-05 PROCEDURE — 1200000000 HC SEMI PRIVATE

## 2021-09-05 PROCEDURE — 36415 COLL VENOUS BLD VENIPUNCTURE: CPT

## 2021-09-05 PROCEDURE — 85027 COMPLETE CBC AUTOMATED: CPT

## 2021-09-05 PROCEDURE — 84100 ASSAY OF PHOSPHORUS: CPT

## 2021-09-05 PROCEDURE — 99232 SBSQ HOSP IP/OBS MODERATE 35: CPT | Performed by: INTERNAL MEDICINE

## 2021-09-05 PROCEDURE — APPSS30 APP SPLIT SHARED TIME 16-30 MINUTES: Performed by: NURSE PRACTITIONER

## 2021-09-05 PROCEDURE — 73130 X-RAY EXAM OF HAND: CPT

## 2021-09-05 RX ORDER — SODIUM CHLORIDE 9 MG/ML
25 INJECTION, SOLUTION INTRAVENOUS EVERY 24 HOURS
Status: DISCONTINUED | OUTPATIENT
Start: 2021-09-05 | End: 2021-09-08

## 2021-09-05 RX ORDER — NICOTINE 21 MG/24HR
1 PATCH, TRANSDERMAL 24 HOURS TRANSDERMAL DAILY
Status: DISCONTINUED | OUTPATIENT
Start: 2021-09-05 | End: 2021-09-05

## 2021-09-05 RX ORDER — SODIUM CHLORIDE 9 MG/ML
INJECTION, SOLUTION INTRAVENOUS EVERY 24 HOURS
Status: DISCONTINUED | OUTPATIENT
Start: 2021-09-05 | End: 2021-09-05 | Stop reason: CLARIF

## 2021-09-05 RX ORDER — OXYCODONE HYDROCHLORIDE 5 MG/1
2.5 TABLET ORAL EVERY 4 HOURS PRN
Status: DISCONTINUED | OUTPATIENT
Start: 2021-09-05 | End: 2021-09-13 | Stop reason: HOSPADM

## 2021-09-05 RX ORDER — OXYCODONE HYDROCHLORIDE 5 MG/1
5 TABLET ORAL EVERY 4 HOURS PRN
Status: DISCONTINUED | OUTPATIENT
Start: 2021-09-05 | End: 2021-09-13 | Stop reason: HOSPADM

## 2021-09-05 RX ADMIN — VENLAFAXINE 37.5 MG: 37.5 TABLET ORAL at 11:05

## 2021-09-05 RX ADMIN — CEFEPIME HYDROCHLORIDE 2000 MG: 2 INJECTION, POWDER, FOR SOLUTION INTRAVENOUS at 11:05

## 2021-09-05 RX ADMIN — DOCUSATE SODIUM 100 MG: 100 CAPSULE, LIQUID FILLED ORAL at 10:11

## 2021-09-05 RX ADMIN — DOCUSATE SODIUM 100 MG: 100 CAPSULE, LIQUID FILLED ORAL at 21:14

## 2021-09-05 RX ADMIN — SACUBITRIL AND VALSARTAN 1 TABLET: 24; 26 TABLET, FILM COATED ORAL at 10:12

## 2021-09-05 RX ADMIN — CEFDINIR 300 MG: 300 CAPSULE ORAL at 05:58

## 2021-09-05 RX ADMIN — SACUBITRIL AND VALSARTAN 1 TABLET: 24; 26 TABLET, FILM COATED ORAL at 21:14

## 2021-09-05 RX ADMIN — ROSUVASTATIN CALCIUM 10 MG: 10 TABLET, COATED ORAL at 10:11

## 2021-09-05 RX ADMIN — DONEPEZIL HYDROCHLORIDE 5 MG: 5 TABLET, ORALLY DISINTEGRATING ORAL at 21:14

## 2021-09-05 RX ADMIN — OXYCODONE 2.5 MG: 5 TABLET ORAL at 11:49

## 2021-09-05 RX ADMIN — BISACODYL 10 MG: 10 SUPPOSITORY RECTAL at 10:12

## 2021-09-05 RX ADMIN — ACETAMINOPHEN 650 MG: 325 TABLET ORAL at 14:28

## 2021-09-05 RX ADMIN — Medication 10 ML: at 21:14

## 2021-09-05 RX ADMIN — PANTOPRAZOLE SODIUM 40 MG: 40 TABLET, DELAYED RELEASE ORAL at 05:58

## 2021-09-05 RX ADMIN — Medication 10 ML: at 10:12

## 2021-09-05 RX ADMIN — SODIUM CHLORIDE 25 ML: 9 INJECTION, SOLUTION INTRAVENOUS at 15:28

## 2021-09-05 RX ADMIN — Medication 1 TABLET: at 10:12

## 2021-09-05 RX ADMIN — VENLAFAXINE 37.5 MG: 37.5 TABLET ORAL at 21:14

## 2021-09-05 ASSESSMENT — PAIN SCALES - GENERAL
PAINLEVEL_OUTOF10: 0
PAINLEVEL_OUTOF10: 0
PAINLEVEL_OUTOF10: 5
PAINLEVEL_OUTOF10: 6

## 2021-09-05 ASSESSMENT — PAIN DESCRIPTION - DESCRIPTORS: DESCRIPTORS: SHARP;ACHING

## 2021-09-05 ASSESSMENT — PAIN DESCRIPTION - ORIENTATION: ORIENTATION: LEFT

## 2021-09-05 ASSESSMENT — PAIN DESCRIPTION - ONSET: ONSET: SUDDEN

## 2021-09-05 ASSESSMENT — PAIN SCALES - WONG BAKER: WONGBAKER_NUMERICALRESPONSE: 6

## 2021-09-05 ASSESSMENT — PAIN DESCRIPTION - FREQUENCY: FREQUENCY: INTERMITTENT

## 2021-09-05 ASSESSMENT — PAIN DESCRIPTION - PAIN TYPE: TYPE: ACUTE PAIN

## 2021-09-05 ASSESSMENT — PAIN DESCRIPTION - LOCATION: LOCATION: HAND

## 2021-09-05 NOTE — PLAN OF CARE
Problem: Falls - Risk of:  Goal: Will remain free from falls  Description: Will remain free from falls  9/5/2021 1131 by Nicole Rondon RN  Outcome: Met This Shift  9/5/2021 0510 by Chandana Chin RN  Outcome: Met This Shift  Goal: Absence of physical injury  Description: Absence of physical injury  9/5/2021 1131 by Nicole Rondon RN  Outcome: Met This Shift  9/5/2021 0510 by Chandana Chin RN  Outcome: Met This Shift     Problem: Skin Integrity:  Goal: Will show no infection signs and symptoms  Description: Will show no infection signs and symptoms  9/5/2021 1131 by Nicole Rondon RN  Outcome: Met This Shift  9/5/2021 0510 by Chandana Chin RN  Outcome: Met This Shift  Goal: Absence of new skin breakdown  Description: Absence of new skin breakdown  9/5/2021 1131 by Nicole Rondon RN  Outcome: Met This Shift  9/5/2021 0510 by Chandana Chin RN  Outcome: Met This Shift     Problem: Pain:  Goal: Pain level will decrease  Description: Pain level will decrease  Outcome: Met This Shift  Goal: Control of acute pain  Description: Control of acute pain  Outcome: Met This Shift  Goal: Control of chronic pain  Description: Control of chronic pain  Outcome: Met This Shift

## 2021-09-05 NOTE — PROGRESS NOTES
[address] [place], [place] [address]ist   Progress Note    Admitting Date and Time: 8/29/2021  9:17 AM  Admit Dx: Upper GI bleed [K92.2]    Subjective:    Pt remains confused but alert. Patient denies any complaints of pain, shortness of breath or chest pressure. Patient had a low grade temp earlier today and his discharge was held. ROS: denies fever, chills, cp, sob, n/v, HA unless stated above.      cefepime (MAXIPIME) 2000 mg IVPB extended (mini-bag)  2,000 mg IntraVENous Q24H    [Held by provider] cefdinir  300 mg Oral 2 times per day    lidocaine  1 patch TransDERmal Daily    pantoprazole  40 mg Oral QAM AC    vitamin B and C  1 tablet Oral Daily    bisacodyl  10 mg Rectal Daily    [Held by provider] carvedilol  6.25 mg Oral BID WC    [Held by provider] clopidogrel  75 mg Oral Daily    docusate sodium  100 mg Oral BID    donepezil  5 mg Oral Nightly    [Held by provider] ferrous sulfate  325 mg Oral Daily with breakfast    rosuvastatin  10 mg Oral Daily    sacubitril-valsartan  1 tablet Oral BID    [Held by provider] torsemide  5 mg Oral Every Other Day    venlafaxine  37.5 mg Oral BID    sodium chloride flush  5-40 mL IntraVENous 2 times per day     oxyCODONE, 2.5 mg, Q4H PRN   Or  oxyCODONE, 5 mg, Q4H PRN  sodium chloride, , PRN  sodium chloride flush, 5-40 mL, PRN  sodium chloride, 25 mL, PRN  ondansetron, 4 mg, Q8H PRN   Or  ondansetron, 4 mg, Q6H PRN  polyethylene glycol, 17 g, Daily PRN  acetaminophen, 650 mg, Q6H PRN   Or  acetaminophen, 650 mg, Q6H PRN         Objective:    /61   Pulse 94   Temp 100 °F (37.8 °C) (Oral)   Resp 20   Ht 6' 4\" (1.93 m)   Wt 180 lb (81.6 kg)   SpO2 95%   BMI 21.91 kg/m²   General Appearance: alert and oriented to person, pleasantly confused some days worse than others  and in no acute distress  Skin: warm and dry  Head: normocephalic and atraumatic  Eyes: pupils equal, round, and reactive to light, conjunctivae normal  Neck: neck supple and non tender without mass   Pulmonary/Chest: diminished with no adventitious breath sounds noted, normal air movement, no respiratory distress  Cardiovascular: normal rate, normal S1 and S2 and no carotid bruits  Abdomen: soft, non-tender, non-distended, normal bowel sounds   Extremities: no cyanosis, no clubbing and  no edema  Neurologic: no cranial nerve deficit and speech normal      Recent Labs     09/03/21  0933 09/03/21  0933 09/03/21  1746 09/04/21  0606 09/05/21  0842     --   --  136 137   K 2.7*   < > 3.5 3.6 3.5     --   --  105 105   CO2 24  --   --  23 26   BUN 21  --   --  18 17   CREATININE 1.6*  --   --  1.7* 1.6*   GLUCOSE 98  --   --  95 104*   CALCIUM 7.9*  --   --  8.1* 8.5*    < > = values in this interval not displayed. Recent Labs     09/04/21  0606   ALKPHOS 78   PROT 5.4*   LABALBU 2.2*   BILITOT 0.3   AST 12   ALT 8       Recent Labs     09/03/21 1746 09/04/21  0606 09/05/21  0842   WBC 11.2 9.6 11.8*   RBC 2.29* 2.54* 2.49*   HGB 7.2* 7.8* 7.8*   HCT 22.0* 25.1* 24.3*   MCV 96.1 98.8 97.6   MCH 31.4 30.7 31.3   MCHC 32.7 31.1* 32.1   RDW 17.8* 17.7* 17.8*    249 262   MPV 10.6 10.8 11.0        Radiology:   NM GI BLOOD LOSS   Final Result   No evidence of active GI bleeding during acquisition. US RETROPERITONEAL COMPLETE   Final Result   1. Preserved cortical thickness bilaterally. No hydronephrosis. 2.  3.7 cm left lower pole simple appearing renal cyst.      3.  Circumferentially thickened bladder wall. Nonspecific finding that may   reflect a sequela of chronic bladder outlet obstruction or potentially   chronic cystitis. Please correlate with clinical presentation. XR CHEST (2 VW)   Final Result   No radiographic evidence of acute cardiopulmonary disease.          XR HAND LEFT (MIN 3 VIEWS)    (Results Pending)       Assessment:  Principal Problem:    Upper GI bleed  Active Problems:    CKD (chronic kidney disease) stage 3, GFR 30-59 ml/min (HCC)    CAD (coronary artery disease), autologous vein bypass graft    CHF (congestive heart failure) (HCC)    Acute blood loss anemia    Moderate protein-calorie malnutrition (HCC)    Decubitus ulcer of left heel, unstageable ([facility])    Pressure injury of sacral region, stage 2 ([facility]    Acute cystitis with hematuria    Sepsis ([facility]  Resolved Problems:    * No resolved hospital problems. *      Plan:    1. Sepsis due to UTI - unclear source, possible UTI - cultures   CNR< 10,000 - low grade temp - switched back to IV Cefepime and held YEUNG ANDRAE     2. Acute blood loss anemia due to suspected upper GI hemorrhage - continue serial H&H for now - holding all anticoagulation given recurrent GI bleeding - Hgb remains stable - stool for occult blood negative - repeat bleeding scan still remains negative - no GI plans for colonoscopy at this time      3. Stage IIIb chronic kidney disease - minimal change noted in creatinine - continue to monitor     4. Coronary artery disease - Lipitor - hold Aspirin and Plavix until bleeding issues resolved     5. Chronic heart failure with reduced ejection fraction - does not appear volume overloaded at this time - continue to treat with gentle IV fluid hydration as needed and closely monitor     6. Dementia - no changes to medications - still pleasant and confused      7. Depression - controlled with medication - emotional support provided      8. Recent hip replacement - SCDs for DVT prophylaxis - Hold pharmacologic due to bleeding issues     9. Unstageable left heel decubitus ulcer and stage 2 sacral decubitus ulcer - wound care following  - heel boots      NOTE: This report was transcribed using voice recognition software. Every effort was made to ensure accuracy; however, inadvertent computerized transcription errors may be present.      Electronically signed by LAZARO Canales CNP on 9/5/2021 at 3:15 PM

## 2021-09-06 ENCOUNTER — APPOINTMENT (OUTPATIENT)
Dept: GENERAL RADIOLOGY | Age: 86
DRG: 871 | End: 2021-09-06
Payer: MEDICARE

## 2021-09-06 LAB
ALBUMIN SERPL-MCNC: 2.4 G/DL (ref 3.5–5.2)
ALP BLD-CCNC: 74 U/L (ref 40–129)
ALT SERPL-CCNC: 7 U/L (ref 0–40)
ANION GAP SERPL CALCULATED.3IONS-SCNC: 8 MMOL/L (ref 7–16)
AST SERPL-CCNC: 10 U/L (ref 0–39)
BASOPHILS ABSOLUTE: 0.05 E9/L (ref 0–0.2)
BASOPHILS RELATIVE PERCENT: 0.4 % (ref 0–2)
BILIRUB SERPL-MCNC: 0.3 MG/DL (ref 0–1.2)
BLOOD CULTURE, ROUTINE: NORMAL
BUN BLDV-MCNC: 19 MG/DL (ref 6–23)
CALCIUM SERPL-MCNC: 8.2 MG/DL (ref 8.6–10.2)
CHLORIDE BLD-SCNC: 103 MMOL/L (ref 98–107)
CO2: 25 MMOL/L (ref 22–29)
CREAT SERPL-MCNC: 1.6 MG/DL (ref 0.7–1.2)
CULTURE, BLOOD 2: NORMAL
EOSINOPHILS ABSOLUTE: 0.21 E9/L (ref 0.05–0.5)
EOSINOPHILS RELATIVE PERCENT: 1.7 % (ref 0–6)
GFR AFRICAN AMERICAN: 49
GFR NON-AFRICAN AMERICAN: 41 ML/MIN/1.73
GLUCOSE BLD-MCNC: 97 MG/DL (ref 74–99)
HCT VFR BLD CALC: 24 % (ref 37–54)
HEMOGLOBIN: 7.4 G/DL (ref 12.5–16.5)
IMMATURE GRANULOCYTES #: 0.2 E9/L
IMMATURE GRANULOCYTES %: 1.6 % (ref 0–5)
LYMPHOCYTES ABSOLUTE: 1.1 E9/L (ref 1.5–4)
LYMPHOCYTES RELATIVE PERCENT: 8.9 % (ref 20–42)
MCH RBC QN AUTO: 30.6 PG (ref 26–35)
MCHC RBC AUTO-ENTMCNC: 30.8 % (ref 32–34.5)
MCV RBC AUTO: 99.2 FL (ref 80–99.9)
MONOCYTES ABSOLUTE: 1.18 E9/L (ref 0.1–0.95)
MONOCYTES RELATIVE PERCENT: 9.5 % (ref 2–12)
NEUTROPHILS ABSOLUTE: 9.62 E9/L (ref 1.8–7.3)
NEUTROPHILS RELATIVE PERCENT: 77.9 % (ref 43–80)
PDW BLD-RTO: 17.8 FL (ref 11.5–15)
PLATELET # BLD: 247 E9/L (ref 130–450)
PMV BLD AUTO: 11.2 FL (ref 7–12)
POTASSIUM SERPL-SCNC: 3.4 MMOL/L (ref 3.5–5)
PROCALCITONIN: 0.11 NG/ML (ref 0–0.08)
RBC # BLD: 2.42 E12/L (ref 3.8–5.8)
SODIUM BLD-SCNC: 136 MMOL/L (ref 132–146)
TOTAL PROTEIN: 5.5 G/DL (ref 6.4–8.3)
WBC # BLD: 12.4 E9/L (ref 4.5–11.5)

## 2021-09-06 PROCEDURE — 2580000003 HC RX 258: Performed by: INTERNAL MEDICINE

## 2021-09-06 PROCEDURE — 85025 COMPLETE CBC W/AUTO DIFF WBC: CPT

## 2021-09-06 PROCEDURE — 99233 SBSQ HOSP IP/OBS HIGH 50: CPT | Performed by: INTERNAL MEDICINE

## 2021-09-06 PROCEDURE — 6370000000 HC RX 637 (ALT 250 FOR IP): Performed by: INTERNAL MEDICINE

## 2021-09-06 PROCEDURE — 80053 COMPREHEN METABOLIC PANEL: CPT

## 2021-09-06 PROCEDURE — 6360000002 HC RX W HCPCS: Performed by: INTERNAL MEDICINE

## 2021-09-06 PROCEDURE — 1200000000 HC SEMI PRIVATE

## 2021-09-06 PROCEDURE — 84145 PROCALCITONIN (PCT): CPT

## 2021-09-06 PROCEDURE — 6370000000 HC RX 637 (ALT 250 FOR IP): Performed by: NURSE PRACTITIONER

## 2021-09-06 PROCEDURE — APPSS30 APP SPLIT SHARED TIME 16-30 MINUTES: Performed by: NURSE PRACTITIONER

## 2021-09-06 PROCEDURE — 36415 COLL VENOUS BLD VENIPUNCTURE: CPT

## 2021-09-06 PROCEDURE — 71046 X-RAY EXAM CHEST 2 VIEWS: CPT

## 2021-09-06 PROCEDURE — 97535 SELF CARE MNGMENT TRAINING: CPT | Performed by: OCCUPATIONAL THERAPIST

## 2021-09-06 PROCEDURE — 97530 THERAPEUTIC ACTIVITIES: CPT | Performed by: OCCUPATIONAL THERAPIST

## 2021-09-06 PROCEDURE — 87040 BLOOD CULTURE FOR BACTERIA: CPT

## 2021-09-06 RX ORDER — GUAIFENESIN 100 MG/5ML
200 SOLUTION ORAL EVERY 4 HOURS PRN
Status: DISCONTINUED | OUTPATIENT
Start: 2021-09-06 | End: 2021-09-13 | Stop reason: HOSPADM

## 2021-09-06 RX ADMIN — Medication 10 ML: at 23:11

## 2021-09-06 RX ADMIN — SACUBITRIL AND VALSARTAN 1 TABLET: 24; 26 TABLET, FILM COATED ORAL at 10:46

## 2021-09-06 RX ADMIN — DONEPEZIL HYDROCHLORIDE 5 MG: 5 TABLET, ORALLY DISINTEGRATING ORAL at 20:36

## 2021-09-06 RX ADMIN — DOCUSATE SODIUM 100 MG: 100 CAPSULE, LIQUID FILLED ORAL at 10:46

## 2021-09-06 RX ADMIN — POTASSIUM BICARBONATE 20 MEQ: 782 TABLET, EFFERVESCENT ORAL at 10:55

## 2021-09-06 RX ADMIN — PANTOPRAZOLE SODIUM 40 MG: 40 TABLET, DELAYED RELEASE ORAL at 06:19

## 2021-09-06 RX ADMIN — DOCUSATE SODIUM 100 MG: 100 CAPSULE, LIQUID FILLED ORAL at 20:36

## 2021-09-06 RX ADMIN — SODIUM CHLORIDE 25 ML: 9 INJECTION, SOLUTION INTRAVENOUS at 12:32

## 2021-09-06 RX ADMIN — CEFEPIME HYDROCHLORIDE 2000 MG: 2 INJECTION, POWDER, FOR SOLUTION INTRAVENOUS at 10:56

## 2021-09-06 RX ADMIN — VENLAFAXINE 37.5 MG: 37.5 TABLET ORAL at 10:46

## 2021-09-06 RX ADMIN — BISACODYL 10 MG: 10 SUPPOSITORY RECTAL at 10:47

## 2021-09-06 RX ADMIN — OXYCODONE 5 MG: 5 TABLET ORAL at 00:18

## 2021-09-06 RX ADMIN — VENLAFAXINE 37.5 MG: 37.5 TABLET ORAL at 20:36

## 2021-09-06 RX ADMIN — POTASSIUM BICARBONATE 20 MEQ: 782 TABLET, EFFERVESCENT ORAL at 20:37

## 2021-09-06 RX ADMIN — ACETAMINOPHEN 650 MG: 325 TABLET ORAL at 10:55

## 2021-09-06 RX ADMIN — SACUBITRIL AND VALSARTAN 1 TABLET: 24; 26 TABLET, FILM COATED ORAL at 20:36

## 2021-09-06 RX ADMIN — Medication 10 ML: at 10:57

## 2021-09-06 RX ADMIN — Medication 1 TABLET: at 10:46

## 2021-09-06 ASSESSMENT — PAIN SCALES - GENERAL
PAINLEVEL_OUTOF10: 5
PAINLEVEL_OUTOF10: 0
PAINLEVEL_OUTOF10: 0
PAINLEVEL_OUTOF10: 10
PAINLEVEL_OUTOF10: 4

## 2021-09-06 NOTE — PROGRESS NOTES
[address] [place], [place] [address]ist   Progress Note    Admitting Date and Time: 8/29/2021  9:17 AM  Admit Dx: Upper GI bleed [K92.2]    Subjective:    Patient reports that he has a cough. A chest xray was done and was negative. He is currently afebrile, although WBC is elevated at 12.4. Repeat blood cultures ordered. ROS: denies fever, chills, cp, sob, n/v, HA unless stated above.      potassium bicarb-citric acid  20 mEq Oral BID    cefepime (MAXIPIME) 2000 mg IVPB extended (mini-bag)  2,000 mg IntraVENous Q24H    [Held by provider] cefdinir  300 mg Oral 2 times per day    lidocaine  1 patch TransDERmal Daily    pantoprazole  40 mg Oral QAM AC    vitamin B and C  1 tablet Oral Daily    bisacodyl  10 mg Rectal Daily    [Held by provider] carvedilol  6.25 mg Oral BID WC    [Held by provider] clopidogrel  75 mg Oral Daily    docusate sodium  100 mg Oral BID    donepezil  5 mg Oral Nightly    [Held by provider] ferrous sulfate  325 mg Oral Daily with breakfast    rosuvastatin  10 mg Oral Daily    sacubitril-valsartan  1 tablet Oral BID    [Held by provider] torsemide  5 mg Oral Every Other Day    venlafaxine  37.5 mg Oral BID    sodium chloride flush  5-40 mL IntraVENous 2 times per day     oxyCODONE, 2.5 mg, Q4H PRN   Or  oxyCODONE, 5 mg, Q4H PRN  sodium chloride, , PRN  sodium chloride flush, 5-40 mL, PRN  sodium chloride, 25 mL, PRN  ondansetron, 4 mg, Q8H PRN   Or  ondansetron, 4 mg, Q6H PRN  polyethylene glycol, 17 g, Daily PRN  acetaminophen, 650 mg, Q6H PRN   Or  acetaminophen, 650 mg, Q6H PRN         Objective:    /78   Pulse 78   Temp 98 °F (36.7 °C) (Oral)   Resp 16   Ht 6' 4\" (1.93 m)   Wt 180 lb (81.6 kg)   SpO2 96%   BMI 21.91 kg/m²   General Appearance: alert and oriented to person and in no acute distress  Skin: warm and dry, bilateral heel wounds, coccyx/sacral wounds  Head: normocephalic and atraumatic  Eyes: pupils equal, round, and reactive to light, conjunctivae normal  Neck: neck supple and non tender without mass   Pulmonary/Chest: clear to auscultation bilaterally- no wheezes, rales or rhonchi,  no respiratory distress  Cardiovascular: normal rate, normal S1 and S2   Abdomen: soft, non-tender, non-distended, normal bowel sounds, no masses or organomegaly  Extremities: no cyanosis, no clubbing and no edema  Neurologic: no tremor and speech normal      Recent Labs     09/04/21 0606 09/05/21 0842 09/06/21  0516    137 136   K 3.6 3.5 3.4*    105 103   CO2 23 26 25   BUN 18 17 19   CREATININE 1.7* 1.6* 1.6*   GLUCOSE 95 104* 97   CALCIUM 8.1* 8.5* 8.2*       Recent Labs     09/04/21 0606 09/06/21  0516   ALKPHOS 78 74   PROT 5.4* 5.5*   LABALBU 2.2* 2.4*   BILITOT 0.3 0.3   AST 12 10   ALT 8 7       Recent Labs     09/04/21 0606 09/05/21  0842 09/06/21  0516   WBC 9.6 11.8* 12.4*   RBC 2.54* 2.49* 2.42*   HGB 7.8* 7.8* 7.4*   HCT 25.1* 24.3* 24.0*   MCV 98.8 97.6 99.2   MCH 30.7 31.3 30.6   MCHC 31.1* 32.1 30.8*   RDW 17.7* 17.8* 17.8*    262 247   MPV 10.8 11.0 11.2       Radiology:   XR CHEST (2 VW)   Final Result   No acute process. XR HAND LEFT (MIN 3 VIEWS)   Final Result   Chronic appearing findings with prominent degenerative changes. Short-term   follow-up recommended if symptoms persist.         NM GI BLOOD LOSS   Final Result   No evidence of active GI bleeding during acquisition. US RETROPERITONEAL COMPLETE   Final Result   1. Preserved cortical thickness bilaterally. No hydronephrosis. 2.  3.7 cm left lower pole simple appearing renal cyst.      3.  Circumferentially thickened bladder wall. Nonspecific finding that may   reflect a sequela of chronic bladder outlet obstruction or potentially   chronic cystitis. Please correlate with clinical presentation. XR CHEST (2 VW)   Final Result   No radiographic evidence of acute cardiopulmonary disease.              Assessment:  Principal Problem: Upper GI bleed  Active Problems:    CKD (chronic kidney disease) stage 3, GFR 30-59 ml/min ([facility])    CAD (coronary artery disease), autologous vein bypass graft    CHF (congestive heart failure) ([facility])    Acute blood loss anemia    Moderate protein-calorie malnutrition ([facility])    Decubitus ulcer of left heel, unstageable ([facility])    Pressure injury of sacral region, stage 2 ([facility]    Acute cystitis with hematuria    Sepsis ([facility]  Resolved Problems:    * No resolved hospital problems. *      Plan:  1. Acute blood loss anemia due to suspected upper GI hemorrhage:  Hgb on admission was 6.7. He received one unit of PRBCs. S/p EGD on 8/31/21 which did not show any source of bleeding but did show small amount of coffee ground material.  Bleeding scan did not show any evidence of bleeding  Plavix and Eliquis stopped due to recurrent GI bleeding. Hgb is 7.4 today,   2. Sepsis secondary to UTI:  Urinalysis was positive. Urine culture with <10,000 mixed gram negative rods, gram positive organism. Retroperitoneal ultrasound did not show pyelonephritis but did show thickening of the bladder consistent with Cefepime stopped and Omnicef was started. The patient WBC was elevated at 11.8 and temp was 100 yesterday. His Omnicef was placed on hold and Cefepime was restarted. Repeat blood cultures ordered. 3. Chronic kidney disease stage 3:  Creatinine is 1.6, around baseline  4. CAD:  Intracoronary stent in 1993. Left carotid endarterectomy in 2016. Continue Crestor. Plavix and Eliquis have been stopped. 5. Dementia: Continue Aricept. 6. Combined systolic and diastolic heart failure:  Last Echo on 7/19/21 with an EF of 35-40% and indeterminate diastolic dysfunction. Coreg on hold. Continue Entresto. Monitor for signs of fluid overload. Patient follows with [employer] at [facility]   7. Depression:  Continue venlafaxine. 8. Unstageable left heel decubitus and stage 2 sacral decubitus ulcer:  Wound Care following. 9. Recent hip replacement:  Total hip replacement performed at [facility].  PT/OT. Patient is from [facility] DR TOM CHAVEZ. NOTE: This report was transcribed using voice recognition software. Every effort was made to ensure accuracy; however, inadvertent computerized transcription errors may be present.      Electronically signed by LAZARO Edmonds CNP on 9/6/2021 at 2:25 PM

## 2021-09-06 NOTE — PROGRESS NOTES
OT BEDSIDE TREATMENT NOTE   [address]  [address] [facility]    Date:2021                                                   Patient Name: Tamea Dance     MRN: [id number]     : 1932     Room: [address] [place] [address]     EVAL    Evaluating OT: Louise Cali, JOSER/L; [id number]        Referring Provider and Orders/Date:   OT eval and treat Start: 21 1000, End: 21 1000, ONE TIME, Standing Count: 1 Occurrences R    Alberto Bowden DO     Diagnosis:   1. Upper GI bleed              Pertinent Medical History:        Past Medical History           Past Medical History:   Diagnosis Date    CAD (coronary artery disease)      CHF (congestive heart failure) ([facility]      COPD (chronic obstructive pulmonary disease) ([facility]      GERD (gastroesophageal reflux disease)      Hyperlipidemia      Hypertension      Myocardial infarction ([facility] 15 yrs ago             Past Surgical History             Past Surgical History:   Procedure Laterality Date    CATARACT REMOVAL WITH IMPLANT Right 3/3/14    CATARACT REMOVAL WITH IMPLANT Left 10/13/2014    COLONOSCOPY        CORONARY ARTERY BYPASS GRAFT   15 yrs ago     3 stents    ENDOSCOPY, COLON, DIAGNOSTIC               Precautions:  Fall Risk, confusion    Recommended placement: subacute as PLOF     Assessment of current deficits     [x]? ? Functional mobility           [x]? ?ADLs           [x]? ? Strength                   [x]? ? Cognition     [x]? ? Functional transfers         [x]? ? IADLs         [x]? ? Safety Awareness   [x]? ?Endurance     []? ? Fine Coordination                        [x]? ? Balance      []? ? Vision/perception    []? ? Sensation       [x]? ? Gross Motor Coordination            []? ? ROM           []? ? Delirium                   []? ? Motor Control      OT PLAN OF CARE   OT POC based on physician orders, patient diagnosis and results of clinical assessment     Frequency/Duration 1-3 days/wk for 2 weeks PRN   Specific OT Treatment Interventions to include:   * Instruction/training on adapted ADL techniques and AE recommendations to increase functional independence within precautions       * Training on energy conservation strategies, correct breathing pattern and techniques to improve independence/tolerance for self-care routine  * Functional transfer/mobility training/DME recommendations for increased independence, safety, and fall prevention  * Patient/Family education to increase follow through with safety techniques and functional independence  * Recommendation of environmental modifications for increased safety with functional transfers/mobility and ADLs  * Therapeutic exercise to improve motor endurance, ROM, and functional strength for ADLs/functional transfers  * Therapeutic activities to facilitate/challenge dynamic balance, stand tolerance for increased safety and independence with ADLs  * Therapeutic activities to facilitate gross/fine motor skills for increased independence with ADLs      Recommended Adaptive Equipment/DME: none with DC to SNF       Home Living:Patient lives with daughter Misael Smith  [facility] [place]  with 2 steps  to enter with Rail     Bathroom setup: tub shower with grab bars and shower chair; standard toilet    DME owned: ww, cane      Prior Level of Function: indep with ADLs , assist with IADLs; ambulated indep   Driving: yes   Occupation: retired-but pt reports that he still works   Enjoys: \"I spend all my time at [employer]"     Pain Level: none  Cognition: A&O: 2/4 to person and situation; Follows 2-3 step directions.  Pt is overall confused and a poor historian               Memory:  Poor+              Sequencing:    Poor+              Problem solving:    Poor+              Judgement/safety:    Poor+     AM-PAC Daily Activity Inpatient   How much help for putting on and taking off regular lower body clothing?: A Lot  How much help for Bathing?: A Lot  How much help for Toileting?: A Lot  How much help for putting on and taking off regular upper body clothing?: A Lot  How much help for taking care of personal grooming?: A Little  How much help for eating meals?: A Little  AM-[facility] Inpatient Daily Activity Raw Score: 14  AM-PAC Inpatient ADL T-Scale Score : 33.39  ADL Inpatient CMS 0-100% Score: 59.67  ADL Inpatient CMS G-Code Modifier : CK                  Functional Assessment:      Initial Eval Status  Date: 8/31/2021   Treatment Status  Date: 9/6/2021 STGs = LTGs  Time frame: 10-14 days   Feeding Minimal Assist with physical demonstration with drinks for hand over hand to prevent spillage. Pt NPO on arrival.  Set up only to open containers and bring drinks to mouth with hand over hand to prevent spillage. Indep   Grooming Minimal Assist with encouragement required for face wash and oral care. Assist to set up and comb back of hair Set up and extended time with oral care with tooth brush and mouth wash. Encouragement from daughter and therapist for highest indep. Declined to completed EOB. Set up for face wash and hand wash. Pt requesting hair wash. Pt educated on use of conditioning cap, including energy conservation with fatigue lifting arms above head. Pt required assist for proper placement but was able to rub in with min A in the back. Assist from therapist to comb out hair in the back   [facility] Moderate Assist with gown management from sitting EOB Mod A to change gowns and thread monitor and IV lines around gown  Stand by Assist    LB Dressing Maximal Assist to thread merlene feet into pants and assist to pull pants and brief up in standing with pt unable to let go of walker for balance and support N/T  Minimal Assist    Bathing Minimal Assist with sponge bathing for buttocks and merlene feet.  Motivation and encouragement for all other parts.  Nt due to pt declining Stand by Assist    Toileting Maximal Assist with use of urinal due to confusion. Pt was able to complete pauline care following with encouragement.  NT with external urine management device Stand by Assist    Bed Mobility  Supine to sit: Moderate Assist   Sit to supine: Moderate Assist  With trunk support and cues for understanding commands and body mechanics  Mod A for rolling with use of bed rails for support to change out linen under pt. Supine to sit: Supervision   Sit to supine: Supervision    Functional Transfers Moderate Assist for sit to stand from elevated surface of bed with confusion with walker. Loss of balance backwards initially due to body mechanics N/T  Stand by Assist    Functional Mobility Minimal Assist for side steps to [address] with walker. Poor+ safety and understanding of device.  NT Stand by Assist    Balance Sitting:     Static:  good    Dynamic:fair+  Standing: fair- Sitting:     Static: NT    Dynamic:N/T  Standing: N/T  Sitting:     Dynamic:good  Standing: fair   Activity Tolerance Vitals with activity:room air with 86% sitting EOB and 88% supine following activity. Reported to nursing. Fair- tolerance to therapist with standing tolerance <1min.  Fair minus d/t c/o fatigue. Declined sitting and standing.   Increase standing tolerance for >3min with stable vital signs for carry over into toileting, functional tranfers and indep in ADLs   Visual/  Perceptual Glasses: not Present; [facility]     Reports change in vision since admission: No      NA   Clif UE Strengthening  4-/5 generally   5/5MMT generally for carry over into self care, functional transfers and functional mobility with AD.       Hand Dominance  [x]? ? Right   []? ? Left     AROM (PROM) Strength Additional Info:    RUE  WFL with exception of supination 4-/5 Fair  and  FMC/dexterity noted during ADL tasks  Opposition []? ? Intact [x]? ? Impaired  Finger to nose [x]? ? Intact []? ? Impaired      LUE WFL with exception of L shoulder flexion and supination 4-/5 Fair  and of current medical information, gathering information on past medical history/social history and prior level of function, informal observation of tasks, assessment of data and education on plan of care and goals.      Treatment Time In: 8369    Treatment Time Out: 0151     Treatment Charges: Mins Units   ADL/Home Mgt     89698 13 1   Thera Activities     44719 12 1   Ther Ex                 [id number]       Manual Therapy    49512       Neuro Re-ed         05288       Orthotic manage/training                               96693       Non Billable Time       Total Timed Treatment 25 2     Rolanda Orozco OTR/L; [id number]

## 2021-09-07 LAB
ABO/RH: NORMAL
ANION GAP SERPL CALCULATED.3IONS-SCNC: 8 MMOL/L (ref 7–16)
ANTIBODY SCREEN: NORMAL
BLOOD BANK DISPENSE STATUS: NORMAL
BLOOD BANK PRODUCT CODE: NORMAL
BPU ID: NORMAL
BUN BLDV-MCNC: 23 MG/DL (ref 6–23)
CALCIUM SERPL-MCNC: 8 MG/DL (ref 8.6–10.2)
CHLORIDE BLD-SCNC: 103 MMOL/L (ref 98–107)
CO2: 25 MMOL/L (ref 22–29)
CREAT SERPL-MCNC: 1.7 MG/DL (ref 0.7–1.2)
DESCRIPTION BLOOD BANK: NORMAL
GFR AFRICAN AMERICAN: 46
GFR NON-AFRICAN AMERICAN: 38 ML/MIN/1.73
GLUCOSE BLD-MCNC: 103 MG/DL (ref 74–99)
HCT VFR BLD CALC: 21.6 % (ref 37–54)
HCT VFR BLD CALC: 26.9 % (ref 37–54)
HEMOGLOBIN: 6.9 G/DL (ref 12.5–16.5)
HEMOGLOBIN: 8.8 G/DL (ref 12.5–16.5)
MAGNESIUM: 1.6 MG/DL (ref 1.6–2.6)
MCH RBC QN AUTO: 31.4 PG (ref 26–35)
MCHC RBC AUTO-ENTMCNC: 31.9 % (ref 32–34.5)
MCV RBC AUTO: 98.2 FL (ref 80–99.9)
PDW BLD-RTO: 17.8 FL (ref 11.5–15)
PHOSPHORUS: 2 MG/DL (ref 2.5–4.5)
PLATELET # BLD: 231 E9/L (ref 130–450)
PMV BLD AUTO: 11 FL (ref 7–12)
POTASSIUM SERPL-SCNC: 3.7 MMOL/L (ref 3.5–5)
PRO-BNP: ABNORMAL PG/ML (ref 0–450)
RBC # BLD: 2.2 E12/L (ref 3.8–5.8)
SODIUM BLD-SCNC: 136 MMOL/L (ref 132–146)
VITAMIN D 25-HYDROXY: 39 NG/ML (ref 30–100)
WBC # BLD: 8.6 E9/L (ref 4.5–11.5)

## 2021-09-07 PROCEDURE — 86900 BLOOD TYPING SEROLOGIC ABO: CPT

## 2021-09-07 PROCEDURE — 2580000003 HC RX 258: Performed by: NURSE PRACTITIONER

## 2021-09-07 PROCEDURE — 80048 BASIC METABOLIC PNL TOTAL CA: CPT

## 2021-09-07 PROCEDURE — 82306 VITAMIN D 25 HYDROXY: CPT

## 2021-09-07 PROCEDURE — APPSS30 APP SPLIT SHARED TIME 16-30 MINUTES: Performed by: NURSE PRACTITIONER

## 2021-09-07 PROCEDURE — 6370000000 HC RX 637 (ALT 250 FOR IP): Performed by: INTERNAL MEDICINE

## 2021-09-07 PROCEDURE — 83735 ASSAY OF MAGNESIUM: CPT

## 2021-09-07 PROCEDURE — 85014 HEMATOCRIT: CPT

## 2021-09-07 PROCEDURE — 6360000002 HC RX W HCPCS: Performed by: NURSE PRACTITIONER

## 2021-09-07 PROCEDURE — 83880 ASSAY OF NATRIURETIC PEPTIDE: CPT

## 2021-09-07 PROCEDURE — 36430 TRANSFUSION BLD/BLD COMPNT: CPT

## 2021-09-07 PROCEDURE — 85018 HEMOGLOBIN: CPT

## 2021-09-07 PROCEDURE — 86923 COMPATIBILITY TEST ELECTRIC: CPT

## 2021-09-07 PROCEDURE — 2500000003 HC RX 250 WO HCPCS: Performed by: NURSE PRACTITIONER

## 2021-09-07 PROCEDURE — 99232 SBSQ HOSP IP/OBS MODERATE 35: CPT | Performed by: INTERNAL MEDICINE

## 2021-09-07 PROCEDURE — 86901 BLOOD TYPING SEROLOGIC RH(D): CPT

## 2021-09-07 PROCEDURE — 36415 COLL VENOUS BLD VENIPUNCTURE: CPT

## 2021-09-07 PROCEDURE — 85027 COMPLETE CBC AUTOMATED: CPT

## 2021-09-07 PROCEDURE — 1200000000 HC SEMI PRIVATE

## 2021-09-07 PROCEDURE — 2580000003 HC RX 258: Performed by: INTERNAL MEDICINE

## 2021-09-07 PROCEDURE — 97110 THERAPEUTIC EXERCISES: CPT

## 2021-09-07 PROCEDURE — P9016 RBC LEUKOCYTES REDUCED: HCPCS

## 2021-09-07 PROCEDURE — 86850 RBC ANTIBODY SCREEN: CPT

## 2021-09-07 PROCEDURE — 84100 ASSAY OF PHOSPHORUS: CPT

## 2021-09-07 RX ORDER — MAGNESIUM SULFATE IN WATER 40 MG/ML
2000 INJECTION, SOLUTION INTRAVENOUS ONCE
Status: COMPLETED | OUTPATIENT
Start: 2021-09-07 | End: 2021-09-07

## 2021-09-07 RX ORDER — FUROSEMIDE 10 MG/ML
40 INJECTION INTRAMUSCULAR; INTRAVENOUS ONCE
Status: COMPLETED | OUTPATIENT
Start: 2021-09-07 | End: 2021-09-07

## 2021-09-07 RX ORDER — SODIUM CHLORIDE 9 MG/ML
INJECTION, SOLUTION INTRAVENOUS PRN
Status: DISCONTINUED | OUTPATIENT
Start: 2021-09-07 | End: 2021-09-13 | Stop reason: HOSPADM

## 2021-09-07 RX ADMIN — PANTOPRAZOLE SODIUM 40 MG: 40 TABLET, DELAYED RELEASE ORAL at 06:04

## 2021-09-07 RX ADMIN — BISACODYL 10 MG: 10 SUPPOSITORY RECTAL at 09:03

## 2021-09-07 RX ADMIN — ACETAMINOPHEN 650 MG: 325 TABLET ORAL at 12:59

## 2021-09-07 RX ADMIN — POTASSIUM PHOSPHATE, MONOBASIC POTASSIUM PHOSPHATE, DIBASIC 15 MMOL: 224; 236 INJECTION, SOLUTION, CONCENTRATE INTRAVENOUS at 17:51

## 2021-09-07 RX ADMIN — DOCUSATE SODIUM 100 MG: 100 CAPSULE, LIQUID FILLED ORAL at 09:04

## 2021-09-07 RX ADMIN — MAGNESIUM SULFATE HEPTAHYDRATE 2000 MG: 40 INJECTION, SOLUTION INTRAVENOUS at 09:02

## 2021-09-07 RX ADMIN — DOCUSATE SODIUM 100 MG: 100 CAPSULE, LIQUID FILLED ORAL at 20:36

## 2021-09-07 RX ADMIN — Medication 10 ML: at 09:05

## 2021-09-07 RX ADMIN — Medication 1 TABLET: at 09:03

## 2021-09-07 RX ADMIN — VENLAFAXINE 37.5 MG: 37.5 TABLET ORAL at 20:36

## 2021-09-07 RX ADMIN — SACUBITRIL AND VALSARTAN 1 TABLET: 24; 26 TABLET, FILM COATED ORAL at 09:03

## 2021-09-07 RX ADMIN — Medication 10 ML: at 21:51

## 2021-09-07 RX ADMIN — VENLAFAXINE 37.5 MG: 37.5 TABLET ORAL at 09:04

## 2021-09-07 RX ADMIN — SACUBITRIL AND VALSARTAN 1 TABLET: 24; 26 TABLET, FILM COATED ORAL at 20:36

## 2021-09-07 RX ADMIN — FUROSEMIDE 40 MG: 10 INJECTION, SOLUTION INTRAMUSCULAR; INTRAVENOUS at 17:51

## 2021-09-07 RX ADMIN — DONEPEZIL HYDROCHLORIDE 5 MG: 5 TABLET, ORALLY DISINTEGRATING ORAL at 20:37

## 2021-09-07 RX ADMIN — ROSUVASTATIN CALCIUM 10 MG: 10 TABLET, COATED ORAL at 09:04

## 2021-09-07 ASSESSMENT — PAIN DESCRIPTION - PROGRESSION
CLINICAL_PROGRESSION: OTHER (COMMENT)
CLINICAL_PROGRESSION: OTHER (COMMENT)

## 2021-09-07 ASSESSMENT — PAIN SCALES - GENERAL
PAINLEVEL_OUTOF10: 0

## 2021-09-07 NOTE — PROGRESS NOTES
PROGRESS NOTE    [facility]  Dr. Marjie Cushing, M.D.,  Dr. Prabhu Martinez M.D.,   Dr. Anup Shea D.O.,  Dr. Lito Santillan M.D.,  Dr. Betty Moyer D.O.,        Carlos Bautista  80 y.o.  male    SUBJECTIVE:  Reconsulted for progressive anemia, again requiring tranfusion. Patient denies pain, nausea, vomiting, diarrhea. Per RN, no melena or hematochezia    OBJECTIVE:    BP (!) 106/52   Pulse 86   Temp 98.9 °F (37.2 °C) (Oral)   Resp 20   Ht 6' 4\" (1.93 m)   Wt 180 lb (81.6 kg)   SpO2 94%   BMI 21.91 kg/m²     General: NAD/ male. Elderly. Confused. HEENT: Anicteric sclera/moist oral mucosa  Neck: Supple with trachea midline  Chest: CTA  Cor: Appears regular without gallop  Abd.: Soft/NT/ND. Patient had BM, seen in bed with RN, soft brown and formed.   Extr.:  Decreased muscle tone and bulk throughout  Skin: Warm and dry      DATA:    Stool (measured) : 0 mL  Lab Results   Component Value Date    WBC 8.6 09/07/2021    RBC 2.20 09/07/2021    HGB 6.9 09/07/2021    HCT 21.6 09/07/2021    MCV 98.2 09/07/2021    MCH 31.4 09/07/2021    MCHC 31.9 09/07/2021    RDW 17.8 09/07/2021     09/07/2021    MPV 11.0 09/07/2021     Lab Results   Component Value Date     09/07/2021    K 3.7 09/07/2021    K 3.6 08/30/2021     09/07/2021    CO2 25 09/07/2021    BUN 23 09/07/2021    CREATININE 1.7 09/07/2021    CALCIUM 8.0 09/07/2021    PROT 5.5 09/06/2021    LABALBU 2.4 09/06/2021    BILITOT 0.3 09/06/2021    ALKPHOS 74 09/06/2021    AST 10 09/06/2021    ALT 7 09/06/2021     No results found for: LIPASE  No results found for: AMYLASE      ASSESSMENT/PLAN:       # Acute on chronic anemia/ Melena  - Initially presented with anemia and melena  - EGD 8/31 revealing scant amount of coffee-ground material without evidence of ulcers or erosions  - Hgb trending down slowly, without evidence of overt GI bleed; anemia may also be related to serial phlebotomy and fluid overload (BNP >11k)  - No further episodes of black or dark stools; BM seen today soft and brown  - Bleeding scan on 9/3 negative  - Consider holding anticoagulation/Plavix if clinically feasible  - Next step in endoscopic workup would be a colonoscopy. Patient has apparently been refusing to drink any amount of liquids, per daughter and RN. Discussed this with daughter, including that patient would likely require NGT placement in order to give prep for colonoscopy. Daughter expressed interest in discussing a palliative care approach.   - Recommend palliative care consult  - Will hold off on endoscopy at this time, pending further discussion  - Meanwhile, agree with transfusion for Hgb < 7  - Recheck Hgb in AM     # History of CAD  - No recent PCI  - Recommendations as above     # HFrEF  - EF 35%     # HTN  - Per admitting     # HLD  - Per admitting     # COPD  - Management per primary     # Sepsis  - Per admitting/pertinent consultants    Will discuss with Dr. Leidy Zambrano DO  9/7/2021  3:02 PM

## 2021-09-07 NOTE — PROGRESS NOTES
[place] [address] Hospitalist   Progress Note    Admitting Date and Time: 8/29/2021  9:17 AM  Admit Dx: Upper GI bleed [K92.2]    Subjective:    Patient's Hgb was 6.9 today and he was ordered a unit of packed red blood cells. GI was reconsulted. The patient had crackles in his bases and his BNP was 11.646. Lasix 40 mg IV x 1.        ROS: denies fever, chills, cp, sob, n/v, HA unless stated above.      potassium phosphate IVPB  15 mmol IntraVENous Once    furosemide  40 mg IntraVENous Once    cefepime (MAXIPIME) 2000 mg IVPB extended (mini-bag)  2,000 mg IntraVENous Q24H    [Held by provider] cefdinir  300 mg Oral 2 times per day    lidocaine  1 patch TransDERmal Daily    pantoprazole  40 mg Oral QAM AC    vitamin B and C  1 tablet Oral Daily    bisacodyl  10 mg Rectal Daily    [Held by provider] carvedilol  6.25 mg Oral BID WC    [Held by provider] clopidogrel  75 mg Oral Daily    docusate sodium  100 mg Oral BID    donepezil  5 mg Oral Nightly    [Held by provider] ferrous sulfate  325 mg Oral Daily with breakfast    rosuvastatin  10 mg Oral Daily    sacubitril-valsartan  1 tablet Oral BID    [Held by provider] torsemide  5 mg Oral Every Other Day    venlafaxine  37.5 mg Oral BID    sodium chloride flush  5-40 mL IntraVENous 2 times per day     sodium chloride, , PRN  guaiFENesin, 200 mg, Q4H PRN  oxyCODONE, 2.5 mg, Q4H PRN   Or  oxyCODONE, 5 mg, Q4H PRN  sodium chloride, , PRN  sodium chloride flush, 5-40 mL, PRN  sodium chloride, 25 mL, PRN  ondansetron, 4 mg, Q8H PRN   Or  ondansetron, 4 mg, Q6H PRN  polyethylene glycol, 17 g, Daily PRN  acetaminophen, 650 mg, Q6H PRN   Or  acetaminophen, 650 mg, Q6H PRN         Objective:    BP (!) 99/50   Pulse 90   Temp 99 °F (37.2 °C) (Axillary)   Resp 22   Ht 6' 4\" (1.93 m)   Wt 180 lb (81.6 kg)   SpO2 94%   BMI 21.91 kg/m²   General Appearance: alert and oriented to person and in no acute distress  Skin: warm and dry, bilateral heel wounds, coccyx/sacral wounds  Head: normocephalic and atraumatic  Eyes: pupils equal, round, and reactive to light, conjunctivae normal  Neck: neck supple and non tender without mass   Pulmonary/Chest: clear to auscultation bilaterally- no wheezes, rales or rhonchi,  no respiratory distress  Cardiovascular: normal rate, normal S1 and S2   Abdomen: soft, non-tender, non-distended, normal bowel sounds, no masses or organomegaly  Extremities: no cyanosis, no clubbing and no edema  Neurologic: no tremor and speech normal      Recent Labs     09/05/21  0842 09/06/21  0516 09/07/21  0514    136 136   K 3.5 3.4* 3.7    103 103   CO2 26 25 25   BUN 17 19 23   CREATININE 1.6* 1.6* 1.7*   GLUCOSE 104* 97 103*   CALCIUM 8.5* 8.2* 8.0*       Recent Labs     09/06/21  0516   ALKPHOS 74   PROT 5.5*   LABALBU 2.4*   BILITOT 0.3   AST 10   ALT 7       Recent Labs     09/05/21  0842 09/06/21  0516 09/07/21  0514   WBC 11.8* 12.4* 8.6   RBC 2.49* 2.42* 2.20*   HGB 7.8* 7.4* 6.9*   HCT 24.3* 24.0* 21.6*   MCV 97.6 99.2 98.2   MCH 31.3 30.6 31.4   MCHC 32.1 30.8* 31.9*   RDW 17.8* 17.8* 17.8*    247 231   MPV 11.0 11.2 11.0       Radiology:   XR CHEST (2 VW)   Final Result   No acute process. XR HAND LEFT (MIN 3 VIEWS)   Final Result   Chronic appearing findings with prominent degenerative changes. Short-term   follow-up recommended if symptoms persist.         NM GI BLOOD LOSS   Final Result   No evidence of active GI bleeding during acquisition. US RETROPERITONEAL COMPLETE   Final Result   1. Preserved cortical thickness bilaterally. No hydronephrosis. 2.  3.7 cm left lower pole simple appearing renal cyst.      3.  Circumferentially thickened bladder wall. Nonspecific finding that may   reflect a sequela of chronic bladder outlet obstruction or potentially   chronic cystitis. Please correlate with clinical presentation.          XR CHEST (2 VW)   Final Result   No radiographic evidence of acute cardiopulmonary disease. Assessment:  Principal Problem:    Upper GI bleed  Active Problems:    CKD (chronic kidney disease) stage 3, GFR 30-59 ml/min ([facility])    CAD (coronary artery disease), autologous vein bypass graft    CHF (congestive heart failure) (formerly [facility])    Acute blood loss anemia    Moderate protein-calorie malnutrition ([facility])    Decubitus ulcer of left heel, unstageable ([facility])    Pressure injury of sacral region, stage 2 ([facility])    Acute cystitis with hematuria    Sepsis ([address]    Sepsis due to urinary tract infection ([facility] [address]  Resolved Problems:    * No resolved hospital problems. *      Plan:  1. Acute blood loss anemia due to suspected upper GI hemorrhage:  Hgb on admission was 6.7. He received one unit of PRBCs. S/p EGD on 8/31/21 which did not show any source of bleeding but did show small amount of coffee ground material.  Bleeding scan did not show any evidence of bleeding  Plavix and Eliquis stopped due to recurrent GI bleeding. Hgb today was 6.9 and he was ordered an additional unit if PRBCs. GI reconsulted. A colonoscopy would be the next step but would likely require NGT tube placement. Per discussion with GI and patient's daughter a palliative care consult will be ordered. Monitor H&H and transfuse as needed. 2. Sepsis secondary to UTI:  Urinalysis was positive. Urine culture with <10,000 mixed gram negative rods, gram positive organism. Retroperitoneal ultrasound did not show pyelonephritis but did show thickening of the bladder consistent with Cefepime stopped and Omnicef was started. The patient WBC was elevated at 11.8 and temp was 100 yesterday. His Omnicef was placed on hold and Cefepime was restarted. Repeat blood cultures in process. 3. Chronic kidney disease stage 3:  Creatinine is 1.7, around baseline  4. CAD:  Intracoronary stent in 1993. Left carotid endarterectomy in 2016. Continue Crestor. Plavix and Eliquis have been stopped.    5. Dementia: Continue Aricept. 6. Combined systolic and diastolic heart failure:  Last Echo on 7/19/21 with an EF of 35-40% and indeterminate diastolic dysfunction. Coreg on hold. Continue Entresto. Monitor for signs of fluid overload. Patient follows with Robert Gardner at [facility]   7. Depression:  Continue venlafaxine. 8. Unstageable left heel decubitus and stage 2 sacral decubitus ulcer:  Wound Care following. 9. Recent hip replacement:  Total hip replacement performed at [facility].  PT/OT. Patient is from [facility] DR TOM CHAVEZ. NOTE: This report was transcribed using voice recognition software. Every effort was made to ensure accuracy; however, inadvertent computerized transcription errors may be present.      Electronically signed by LAZARO Jacobs CNP on 9/7/2021 at 2:05 PM

## 2021-09-07 NOTE — PROGRESS NOTES
exercises but verbally expressing pain with left lower extremity. Patient needs continued PT to improve strength and endurance to tolerate and complete functional mobility with decreased assist required. PHYSICAL THERAPY  PLAN OF CARE       Physical therapy plan of care is established based on physician order,  patient diagnosis and clinical assessment    Current Treatment Recommendations:    -Bed Mobility: Lower extremity exercises , Upper extremity exercises  and Trunk control activities   -Sitting Balance: Incorporate reaching activities to activate trunk muscles   -Standing Balance: Perform strengthening exercises in standing to promote motor control with or without upper extremity support  and Challenge balance utilizing reaching  activities beyond center of gravity    -Transfers: Provide instruction on proper hand and foot position for adequate transfer of weight onto lower extremities and use of gait device, Cues for hand placement, technique and safety, Assist with extension of knees trunk and hip to accept weight transfer  and Provide stabilization to prevent fall   -Gait: Gait training, Standing activities to improve: base of support, weight shift, weight bearing , Exercises to improve trunk control and Exercises to improve hip and knee control   -Endurance: Utilize Supervised activities to increase level of endurance to allow for safe functional mobility including transfers and gait     PT long term treatment goals are located in below grid    Patient and or family understand(s) diagnosis, prognosis, and plan of care. Frequency of treatments: Patient will be seen  daily.          Prior Level of Function: Patient ambulated with wheeled walker    Rehab Potential: good    for baseline    Past medical history:   Past Medical History:   Diagnosis Date    CAD (coronary artery disease)     CHF (congestive heart failure) (HCC)     COPD (chronic obstructive pulmonary disease) (HCC)     GERD (gastroesophageal reflux disease)     Hyperlipidemia     Hypertension     Myocardial infarction ([facility] [address] 15 yrs ago     Past Surgical History:   Procedure Laterality Date    CATARACT REMOVAL WITH IMPLANT Right 3/3/14    CATARACT REMOVAL WITH IMPLANT Left 10/13/2014    COLONOSCOPY      CORONARY ARTERY BYPASS GRAFT  15 yrs ago    3 stents    ENDOSCOPY, COLON, DIAGNOSTIC      UPPER GASTROINTESTINAL ENDOSCOPY N/A 2021    EGD ESOPHAGOGASTRODUODENOSCOPY performed by Taya Velarde MD at [address] [place] [address]:    Precautions: Up with assistance, falls and alarm ,  Hip precautions left lower extremity   Social history: Patient lives with daughterMarlene  Wife  in january in a ranch home  with 2 steps  to enter with [employer] owned: Daneil Castor,  [place],     [address] [facility]   How much difficulty turning over in bed?: A Lot  How much difficulty sitting down on / standing up from a chair with arms?: A Lot  How much difficulty moving from lying on back to sitting on side of bed?: A Lot  How much help from another person moving to and from a bed to a chair?: A Lot  How much help from another person needed to walk in hospital room?: A Lot  How much help from another person for climbing 3-5 steps with a railing?: Total  AM-PAC Inpatient Mobility Raw Score : 11  AM-PAC Inpatient T-Scale Score : 33.86  Mobility Inpatient CMS 0-100% Score: 72.57  Mobility Inpatient CMS G-Code Modifier : CL    Nursing cleared patient for PT treatment. OBJECTIVE;   Initial Evaluation  Date: 2021 Treatment Date:    2021   Short Term/ Long Term   Goals   Was pt agreeable to Eval/treatment? Yes Yes  To be met in 3 days   Pain level   5/10  bilateral heels No number given  Left lower extremity    Bed Mobility    Rolling: Minimal assist of 1    Supine to sit: Moderate assist of 1    Sit to supine:  Moderate assist of 1    Scooting: Minimal assist of 1   Rolling: Not assessed    Supine to sit: Not assessed    Sit to supine: Not assessed    Scooting: Not assessed     Rolling: Independent    Supine to sit: Independent    Sit to supine: Independent    Scooting: Independent     Transfers Sit to stand: Moderate assist of 1   Sit to stand: Not assessed     Sit to stand: Supervision      Ambulation    2 steps using  wheeled walker with Moderate assist of 1   for walker control, upright and weight shift and cues for safety and proper hand placement not assessed     50 feet using  wheeled walker with Supervision     Stair negotiation: ascended and descended   Not assessed  Not assessed     2 steps with rail min a   ROM Within functional limits  with exception of . Increase range of motion 10% of affected joints    Strength BUE:  3+/5  RLE:  4/5  LLE:  3/5  Wound on heel  Increase strength in affected mm groups by 1/3 grade   Balance Sitting EOB:  good    Dynamic Standing:  poor   Sitting EOB: not assessed   Dynamic Standing: not assessed    Sitting EOB:  good    Dynamic Standing: fair       Patient is Alert & Oriented x person, place, time and situation and follows one step directions  Flat affect  Sensation:  Patient  denies numbness/tingling   Edema:  yes left lower extremity   Endurance: poor tolerance to upright with fatigue       Patient education  Patient educated on role of Physical Therapy, risks of immobility, safety and plan of care and  importance of mobility while in hospital      Patient response to education:   Pt verbalized understanding Pt demonstrated skill Pt requires further education in this area   Yes Partial Yes      Treatment:  Patient practiced and was instructed/facilitated in the following treatment:  Patient  performed supine exercises. Therapeutic Exercises:  ankle pumps, quad sets, heel slide, hip abduction/adduction and straight leg raise, x 10 reps.      At end of session, patient in bed  with alarm call light and phone within reach,  all lines and tubes intact, nursing notified. Patient would benefit from continued skilled Physical Therapy to improve functional independence and quality of life. Patient's/ family goals   home    Time in  3:01  Time out  3:11    Total Treatment Time  10 minutes        CPT codes:    Therapeutic exercises (83477)   10 minutes  1 unit(s)    Santi Glez.  El  [facility] [place]  LIC # 60725

## 2021-09-07 NOTE — CARE COORDINATION
SOCIAL WORK / DISCHARGE PLANNING:  RAPID COVID TEST WILL BE NEEDED PRIOR TO DC. Pt has [address] [facility] for [facility] DR TOM CHAVEZ, phone [phone], fax [phone], which ends today. [facility] Medicare is now waiving PRECERT but need confirmation from rep Jessica that is agreeable to facility. Pt to receive blood today, GI Consult ordered. PT update will be needed. THAIS will need signed by physician.                      Electronically signed by LD Flynn on 9/7/2021 at 12:04 PM

## 2021-09-08 LAB
ALBUMIN SERPL-MCNC: 2.5 G/DL (ref 3.5–5.2)
ALP BLD-CCNC: 77 U/L (ref 40–129)
ALT SERPL-CCNC: 9 U/L (ref 0–40)
ANION GAP SERPL CALCULATED.3IONS-SCNC: 9 MMOL/L (ref 7–16)
AST SERPL-CCNC: 16 U/L (ref 0–39)
BILIRUB SERPL-MCNC: 0.3 MG/DL (ref 0–1.2)
BUN BLDV-MCNC: 24 MG/DL (ref 6–23)
CALCIUM SERPL-MCNC: 8.5 MG/DL (ref 8.6–10.2)
CHLORIDE BLD-SCNC: 99 MMOL/L (ref 98–107)
CO2: 27 MMOL/L (ref 22–29)
CREAT SERPL-MCNC: 1.7 MG/DL (ref 0.7–1.2)
GFR AFRICAN AMERICAN: 46
GFR NON-AFRICAN AMERICAN: 38 ML/MIN/1.73
GLUCOSE BLD-MCNC: 106 MG/DL (ref 74–99)
HCT VFR BLD CALC: 29.8 % (ref 37–54)
HEMOGLOBIN: 9.6 G/DL (ref 12.5–16.5)
MAGNESIUM: 2.1 MG/DL (ref 1.6–2.6)
MCH RBC QN AUTO: 31.4 PG (ref 26–35)
MCHC RBC AUTO-ENTMCNC: 32.2 % (ref 32–34.5)
MCV RBC AUTO: 97.4 FL (ref 80–99.9)
PDW BLD-RTO: 17.1 FL (ref 11.5–15)
PHOSPHORUS: 3.1 MG/DL (ref 2.5–4.5)
PLATELET # BLD: 260 E9/L (ref 130–450)
PMV BLD AUTO: 11 FL (ref 7–12)
POTASSIUM SERPL-SCNC: 3.9 MMOL/L (ref 3.5–5)
RBC # BLD: 3.06 E12/L (ref 3.8–5.8)
SARS-COV-2, NAAT: DETECTED
SODIUM BLD-SCNC: 135 MMOL/L (ref 132–146)
TOTAL PROTEIN: 5.9 G/DL (ref 6.4–8.3)
WBC # BLD: 8 E9/L (ref 4.5–11.5)

## 2021-09-08 PROCEDURE — 83735 ASSAY OF MAGNESIUM: CPT

## 2021-09-08 PROCEDURE — 2580000003 HC RX 258: Performed by: INTERNAL MEDICINE

## 2021-09-08 PROCEDURE — 6370000000 HC RX 637 (ALT 250 FOR IP): Performed by: INTERNAL MEDICINE

## 2021-09-08 PROCEDURE — 6360000002 HC RX W HCPCS: Performed by: INTERNAL MEDICINE

## 2021-09-08 PROCEDURE — 1200000000 HC SEMI PRIVATE

## 2021-09-08 PROCEDURE — 97530 THERAPEUTIC ACTIVITIES: CPT

## 2021-09-08 PROCEDURE — 84100 ASSAY OF PHOSPHORUS: CPT

## 2021-09-08 PROCEDURE — 99232 SBSQ HOSP IP/OBS MODERATE 35: CPT | Performed by: INTERNAL MEDICINE

## 2021-09-08 PROCEDURE — 97110 THERAPEUTIC EXERCISES: CPT

## 2021-09-08 PROCEDURE — 85027 COMPLETE CBC AUTOMATED: CPT

## 2021-09-08 PROCEDURE — 87635 SARS-COV-2 COVID-19 AMP PRB: CPT

## 2021-09-08 PROCEDURE — 36415 COLL VENOUS BLD VENIPUNCTURE: CPT

## 2021-09-08 PROCEDURE — APPSS30 APP SPLIT SHARED TIME 16-30 MINUTES: Performed by: NURSE PRACTITIONER

## 2021-09-08 PROCEDURE — 80053 COMPREHEN METABOLIC PANEL: CPT

## 2021-09-08 PROCEDURE — 6360000002 HC RX W HCPCS: Performed by: NURSE PRACTITIONER

## 2021-09-08 PROCEDURE — 99223 1ST HOSP IP/OBS HIGH 75: CPT | Performed by: NURSE PRACTITIONER

## 2021-09-08 RX ORDER — LANOLIN ALCOHOL/MO/W.PET/CERES
6 CREAM (GRAM) TOPICAL NIGHTLY PRN
Status: DISCONTINUED | OUTPATIENT
Start: 2021-09-08 | End: 2021-09-13 | Stop reason: HOSPADM

## 2021-09-08 RX ORDER — VITAMIN B COMPLEX
2000 TABLET ORAL DAILY
Status: DISCONTINUED | OUTPATIENT
Start: 2021-09-08 | End: 2021-09-13 | Stop reason: HOSPADM

## 2021-09-08 RX ORDER — ASCORBIC ACID 500 MG
500 TABLET ORAL 2 TIMES DAILY
Status: DISCONTINUED | OUTPATIENT
Start: 2021-09-08 | End: 2021-09-13 | Stop reason: HOSPADM

## 2021-09-08 RX ORDER — FUROSEMIDE 10 MG/ML
20 INJECTION INTRAMUSCULAR; INTRAVENOUS ONCE
Status: COMPLETED | OUTPATIENT
Start: 2021-09-08 | End: 2021-09-08

## 2021-09-08 RX ORDER — DEXAMETHASONE SODIUM PHOSPHATE 10 MG/ML
6 INJECTION, SOLUTION INTRAMUSCULAR; INTRAVENOUS EVERY 24 HOURS
Status: DISCONTINUED | OUTPATIENT
Start: 2021-09-08 | End: 2021-09-12

## 2021-09-08 RX ORDER — ZINC SULFATE 50(220)MG
50 CAPSULE ORAL DAILY
Status: DISCONTINUED | OUTPATIENT
Start: 2021-09-08 | End: 2021-09-13 | Stop reason: HOSPADM

## 2021-09-08 RX ORDER — LANOLIN ALCOHOL/MO/W.PET/CERES
50 CREAM (GRAM) TOPICAL DAILY
Status: DISCONTINUED | OUTPATIENT
Start: 2021-09-08 | End: 2021-09-13 | Stop reason: HOSPADM

## 2021-09-08 RX ORDER — CARVEDILOL 3.12 MG/1
3.12 TABLET ORAL 2 TIMES DAILY WITH MEALS
Status: DISCONTINUED | OUTPATIENT
Start: 2021-09-09 | End: 2021-09-13 | Stop reason: HOSPADM

## 2021-09-08 RX ADMIN — DOCUSATE SODIUM 100 MG: 100 CAPSULE, LIQUID FILLED ORAL at 09:42

## 2021-09-08 RX ADMIN — Medication 1 TABLET: at 09:42

## 2021-09-08 RX ADMIN — DOCUSATE SODIUM 100 MG: 100 CAPSULE, LIQUID FILLED ORAL at 21:12

## 2021-09-08 RX ADMIN — ZINC SULFATE 220 MG (50 MG) CAPSULE 50 MG: CAPSULE at 17:09

## 2021-09-08 RX ADMIN — DONEPEZIL HYDROCHLORIDE 5 MG: 5 TABLET, ORALLY DISINTEGRATING ORAL at 21:12

## 2021-09-08 RX ADMIN — FUROSEMIDE 20 MG: 20 INJECTION, SOLUTION INTRAMUSCULAR; INTRAVENOUS at 17:09

## 2021-09-08 RX ADMIN — Medication 10 ML: at 11:18

## 2021-09-08 RX ADMIN — PYRIDOXINE HCL TAB 50 MG 50 MG: 50 TAB at 17:09

## 2021-09-08 RX ADMIN — SACUBITRIL AND VALSARTAN 1 TABLET: 24; 26 TABLET, FILM COATED ORAL at 09:42

## 2021-09-08 RX ADMIN — DEXAMETHASONE SODIUM PHOSPHATE 6 MG: 10 INJECTION, SOLUTION INTRAMUSCULAR; INTRAVENOUS at 17:08

## 2021-09-08 RX ADMIN — Medication 6 MG: at 23:25

## 2021-09-08 RX ADMIN — BISACODYL 10 MG: 10 SUPPOSITORY RECTAL at 09:42

## 2021-09-08 RX ADMIN — OXYCODONE HYDROCHLORIDE AND ACETAMINOPHEN 500 MG: 500 TABLET ORAL at 21:12

## 2021-09-08 RX ADMIN — SACUBITRIL AND VALSARTAN 1 TABLET: 24; 26 TABLET, FILM COATED ORAL at 21:12

## 2021-09-08 RX ADMIN — Medication 2000 UNITS: at 17:09

## 2021-09-08 RX ADMIN — Medication 10 ML: at 21:12

## 2021-09-08 RX ADMIN — ROSUVASTATIN CALCIUM 10 MG: 10 TABLET, COATED ORAL at 09:42

## 2021-09-08 RX ADMIN — VENLAFAXINE 37.5 MG: 37.5 TABLET ORAL at 21:12

## 2021-09-08 RX ADMIN — PANTOPRAZOLE SODIUM 40 MG: 40 TABLET, DELAYED RELEASE ORAL at 09:42

## 2021-09-08 RX ADMIN — CEFEPIME HYDROCHLORIDE 2000 MG: 2 INJECTION, POWDER, FOR SOLUTION INTRAVENOUS at 11:18

## 2021-09-08 RX ADMIN — VENLAFAXINE 37.5 MG: 37.5 TABLET ORAL at 09:42

## 2021-09-08 ASSESSMENT — PAIN SCALES - GENERAL
PAINLEVEL_OUTOF10: 0

## 2021-09-08 NOTE — CARE COORDINATION
Ss note:9/8/2021.2:19 PM Notified by nursing that pt has a POSITIVE COVID test today 9-8-2021. Per Vanda Neely at [facility] [address] accept a positive covid pt until 10 days post diagnosis. SW will follow.  LD Miller

## 2021-09-08 NOTE — PROGRESS NOTES
[address] [place], [place] [address]ist   Progress Note    Admitting Date and Time: 8/29/2021  9:17 AM  Admit Dx: Upper GI bleed [K92.2]    Subjective:    Patient was to be discharged to [facility] today but his rapid Covid test was positive. Discharge will be held. He complains of a cough. ROS: denies fever, chills, cp, sob, n/v, HA unless stated above.      [START ON 9/9/2021] carvedilol  3.125 mg Oral BID WC    furosemide  20 mg IntraVENous Once    dexamethasone  6 mg IntraVENous Q24H    vitamin B-6  50 mg Oral Daily    ascorbic acid  500 mg Oral BID    zinc sulfate  50 mg Oral Daily    Vitamin D  2,000 Units Oral Daily    lidocaine  1 patch TransDERmal Daily    pantoprazole  40 mg Oral QAM AC    bisacodyl  10 mg Rectal Daily    [Held by provider] clopidogrel  75 mg Oral Daily    docusate sodium  100 mg Oral BID    donepezil  5 mg Oral Nightly    ferrous sulfate  325 mg Oral Daily with breakfast    rosuvastatin  10 mg Oral Daily    sacubitril-valsartan  1 tablet Oral BID    torsemide  5 mg Oral Every Other Day    venlafaxine  37.5 mg Oral BID    sodium chloride flush  5-40 mL IntraVENous 2 times per day     sodium chloride, , PRN  guaiFENesin, 200 mg, Q4H PRN  oxyCODONE, 2.5 mg, Q4H PRN   Or  oxyCODONE, 5 mg, Q4H PRN  sodium chloride, , PRN  sodium chloride flush, 5-40 mL, PRN  sodium chloride, 25 mL, PRN  ondansetron, 4 mg, Q8H PRN   Or  ondansetron, 4 mg, Q6H PRN  polyethylene glycol, 17 g, Daily PRN  acetaminophen, 650 mg, Q6H PRN   Or  acetaminophen, 650 mg, Q6H PRN         Objective:    BP (!) 110/59   Pulse 89   Temp 98.9 °F (37.2 °C) (Oral)   Resp 16   Ht 6' 4\" (1.93 m)   Wt 180 lb (81.6 kg)   SpO2 93%   BMI 21.91 kg/m²   General Appearance: alert and oriented to person and in no acute distress  Skin: warm and dry, bilateral heel wounds, coccyx/sacral wounds  Head: normocephalic and atraumatic  Eyes: pupils equal, round, and reactive to light, conjunctivae normal  Neck: neck No radiographic evidence of acute cardiopulmonary disease. Assessment:  Principal Problem:    Upper GI bleed  Active Problems:    CKD (chronic kidney disease) stage 3, GFR 30-59 ml/min ([facility])    CAD (coronary artery disease), autologous vein bypass graft    CHF (congestive heart failure) ([facility])    Acute blood loss anemia    Moderate protein-calorie malnutrition ([facility])    Decubitus ulcer of left heel, unstageable ([facility])    Pressure injury of sacral region, stage 2 ([facility])    Acute cystitis with hematuria    Sepsis ([address]    Sepsis due to urinary tract infection ([facility] [address]  Resolved Problems:    * No resolved hospital problems. *      Plan:  1. Acute blood loss anemia due to suspected upper GI hemorrhage:  Hgb on admit was 6.7 and he was transfused with one unit of PRBCs. S/p EGD on 8/31/21 which did not show any source of bleeding but did show small amount of coffee ground material.  Bleeding scan did not show any evidence of bleeding  Plavix and Eliquis stopped due to recurrent GI bleeding. Hgb on 9/7/21 was 6.9 and he received one unit of PRBCs. GI reconsulted. A colonoscopy would be the next step but would likely require NGT tube placement. Per discussion with GI and patient's daughter a palliative care consult will be ordered. Monitor H&H and transfuse as needed. 2. Sepsis secondary to UTI:  UA was positive Urine culture with <10,000 mixed gram negative rods, gram positive organism. Retroperitoneal ultrasound did not show pyelonephritis but did show thickening of the bladder. He completed a course of Cefepime/omnicef. 3. COVID 19:  Rapid COVID test was positive. Start Decadron. Remdesivir not indicated at this time as patient is not hypoxic. Start Vitamin D, Zinc, Vitamin B-6. Vitamin C.   4. Chronic kidney disease stage 3:  Creatinine is 1.7, around baseline  5. CAD:  Intracoronary stent in 1993. Left carotid endarterectomy in 2016. Continue Crestor. Plavix and Eliquis have been stopped.

## 2021-09-08 NOTE — CONSULTS
Palliative Care Department  [phone]  Palliative Care Initial Consult  Provider LAZARO Mix CNP, AGACNP-BC    Tamea Dance  [id number]  Hospital Day: 11  Date of Initial Consult: 09/07/2021  Referring Provider: LAZARO Katz CNP  Palliative Medicine was consulted for assistance with:Goals of Care and Symptom Management    HPI:   Tamea Dance is a 80 y.o. with a past medical history of MI, CAD, HTN, HLD, GERD, COPD, CHF who was admitted on 8/29/2021 from [address] [address] with a CHIEF COMPLAINT of decreased hemoglobin on lab work. Patient takes Eliquis. ASSESSMENT/PLAN:     Pertinent Hospital Diagnoses      Upper GI bleed-GI, type and cross, transfuse PRBC, hold Eliquis   Fine systolic and diastolic heart failure   COVID+    Acute on chronic anemia/melena - colonoscopy, NGT on hold at this time       Palliative Care Encounter / Counseling Regarding Goals of Care  Please see detailed goals of care discussion as below   At this time, Tamea Dance, Does Not have capacity for medical decision-making. Capacity is time limited and situation/question specific   During encounter Neha Allen was surrogate medical decision-maker   Outcome of goals of care meeting:   Code status DNR-CCA / DNI    Advanced Directives: POA or living will in [facility]- dropped off on 9/7/2021    Surrogate/Legal NOK:    Agatha Montrell [phone] primary (lives here)  Charlotte Echeverria [phone] Lives in [place]     Spiritual assessment: no spiritual distress identified  Bereavement and grief: to be determined  Referrals to: none today    PDMP checked. Providers-2.  Prescriptions- 2 Dronabinol 2.5 MG Capsule and Tramadol Hcl 50 MG Tablet filled on 8/27/2021   SUBJECTIVE:     Current medical issues leading to Palliative Medicine involvement include   Active Hospital Problems    Diagnosis Date Noted    Sepsis due to urinary tract infection ([facility] [address] [A41.9, N39.0]     Acute cystitis with hematuria [N30.01] 09/01/2021 during exam  Abd:  Soft, non tender, non distended, bowel sounds present  :  External catheter  MSK: sarcopenia present  Ext:  Moving all extremities, no edema, pulses present  Skin:  Warm and dry, no rashes on visible skin, bilateral heel and coccyx/sacral wounds  Psych: non-anxious affect  Neuro:  PERRL, Alert to person and place, grossly nonfocal; follows commands    Objective data reviewed: labs, images, records, medication use, vitals and chart    Discussed patient and the plan of care with the other IDT members: Primary Team, Floor Nurse, Patient and Family    Time/Communication  Greater than 50% of time spent, total 70 minutes in counseling and coordination of care at the bedside regarding goals of care and symptom management. Thank you for allowing Palliative Medicine to participate in the care of Clayton Shaikh. Note: This report was completed using computerize voiced recognition software. Every effort has been made to ensure accuracy; however, inadvertent computerized transcription errors may be present.

## 2021-09-08 NOTE — PROGRESS NOTES
Patient daughter, Frank Renae, has been notified that her dad will not being dc because of a positive covid test.

## 2021-09-08 NOTE — PROGRESS NOTES
Physical Therapy    Physical Therapy Treatment Note/Plan of Care    Room #:  9816/0755-23  Patient Name: Sridevi Monday  YOB: 1932  MRN: [id number]    Date of Service: 9/8/2021     Tentative placement recommendation: Subacute rehab  Equipment recommendation: To be determined      Evaluating Physical Therapist: Bob Mcardle, PT  #[id number]        Specific Provider Orders/Date/Referring Provider :  08/30/21 1000   PT eval and treat Start: 08/30/21 1000, End: 08/30/21 1000, ONE TIME, Standing Count: 1 Occurrences, R    Alberto Bowden DO    Admitting Diagnosis:   Upper GI bleed [K92.2]       Admitted with  anemia     Patient Active Problem List   Diagnosis    Renal insufficiency    CKD (chronic kidney disease) stage 3, GFR 30-59 ml/min ([facility])    Essential hypertension    CAD (coronary artery disease), autologous vein bypass graft    Acid reflux    Anemia    Depression    Diastolic congestive heart failure (HCC)    CHF (congestive heart failure) ([address]    CAD (coronary artery disease)    COPD (chronic obstructive pulmonary disease) (HCC)    Acute respiratory failure with hypoxia ([facility])    Acute blood loss anemia    MAVIS (acute kidney injury) ([address]    Hospital-acquired pneumonia    Moderate protein-calorie malnutrition ([facility])    Upper GI bleed    Decubitus ulcer of left heel, unstageable ([address]    Pressure injury of sacral region, stage 2 ([address]    Acute cystitis with hematuria    Sepsis ([address]    Sepsis due to urinary tract infection ([address]        ASSESSMENT of Current Deficits Patient exhibits decreased strength, balance, endurance, range of motion and pain bilateral heels with open wound impairing functional mobility, transfers, gait , gait distance and tolerance to activity are barriers to d/c and require skilled intervention during hospital stay to attain pre hospital level of function. Patient with PROM/AAROM with all exercises but verbally expressing pain with left lower extremity. Patient needing moderate assist for bilateral lower extremities and trunk flexion for bed mobility. Moderate assist for sit to stand due bilateral lower extremity weakness. Pt only able to attain a 2/3 upright position due to pain. Patient needs continued PT to improve strength and endurance to tolerate and complete functional mobility with decreased assist required. PHYSICAL THERAPY  PLAN OF CARE       Physical therapy plan of care is established based on physician order,  patient diagnosis and clinical assessment    Current Treatment Recommendations:    -Bed Mobility: Lower extremity exercises , Upper extremity exercises  and Trunk control activities   -Sitting Balance: Incorporate reaching activities to activate trunk muscles   -Standing Balance: Perform strengthening exercises in standing to promote motor control with or without upper extremity support  and Challenge balance utilizing reaching  activities beyond center of gravity    -Transfers: Provide instruction on proper hand and foot position for adequate transfer of weight onto lower extremities and use of gait device, Cues for hand placement, technique and safety, Assist with extension of knees trunk and hip to accept weight transfer  and Provide stabilization to prevent fall   -Gait: Gait training, Standing activities to improve: base of support, weight shift, weight bearing , Exercises to improve trunk control and Exercises to improve hip and knee control   -Endurance: Utilize Supervised activities to increase level of endurance to allow for safe functional mobility including transfers and gait     PT long term treatment goals are located in below grid    Patient and or family understand(s) diagnosis, prognosis, and plan of care. Frequency of treatments: Patient will be seen  daily.          Prior Level of Function: Patient ambulated with wheeled walker    Rehab Potential: good    for baseline    Past medical history:   Past Medical History: Diagnosis Date    CAD (coronary artery disease)     CHF (congestive heart failure) (HCC)     COPD (chronic obstructive pulmonary disease) (HCC)     GERD (gastroesophageal reflux disease)     Hyperlipidemia     Hypertension     Myocardial infarction ([facility] [address] 15 yrs ago     Past Surgical History:   Procedure Laterality Date    CATARACT REMOVAL WITH IMPLANT Right 3/3/14    CATARACT REMOVAL WITH IMPLANT Left 10/13/2014    COLONOSCOPY      CORONARY ARTERY BYPASS GRAFT  15 yrs ago    3 stents    ENDOSCOPY, COLON, DIAGNOSTIC      UPPER GASTROINTESTINAL ENDOSCOPY N/A 2021    EGD ESOPHAGOGASTRODUODENOSCOPY performed by Olimpia Nunes MD at [facility]:    Precautions: Up with assistance, falls and alarm ,  Hip precautions left lower extremity   Social history: Patient lives with daughterMarlene  Wife  in january in a ranch home  with 2 steps  to enter with [employer] owned: Luis Alfredo Osullivan,     [address] [place] [address]   How much difficulty turning over in bed?: A Lot  How much difficulty sitting down on / standing up from a chair with arms?: A Lot  How much difficulty moving from lying on back to sitting on side of bed?: A Lot  How much help from another person moving to and from a bed to a chair?: A Lot  How much help from another person needed to walk in hospital room?: A Lot  How much help from another person for climbing 3-5 steps with a railing?: Total  AM-PAC Inpatient Mobility Raw Score : 11  AM-PAC Inpatient T-Scale Score : 33.86  Mobility Inpatient CMS 0-100% Score: 72.57  Mobility Inpatient CMS G-Code Modifier : CL    Nursing cleared patient for PT treatment. OBJECTIVE;   Initial Evaluation  Date: 2021 Treatment Date:    2021   Short Term/ Long Term   Goals   Was pt agreeable to Eval/treatment?  Yes Yes  To be met in 3 days   Pain level   5/10  bilateral heels No number given  Left lower extremity    Bed edge of bed. Pt  Sat edge of bed 10 minutes with Minimal assist of 1 to increase dynamic sitting balance and activity tolerance. Pt stood for 1 rep but only attained a 2/3 upright position. Pt requested to lay back down. Therapeutic Exercises:  ankle pumps, quad sets, heel slide, hip abduction/adduction and straight leg raise, x 10 - 15 reps. At end of session, patient in bed  with alarm call light and phone within reach,  all lines and tubes intact, nursing notified. Patient would benefit from continued skilled Physical Therapy to improve functional independence and quality of life. Patient's/ family goals   home    Time in 1:28  Time out  1:52    Total Treatment Time  24 minutes        CPT codes:    Therapeutic activities (72916)   14 minutes  1 unit(s)  Therapeutic exercises (08367)   10 minutes  1 unit(s)    Ernie Gallegos  [facility]  LIC # [id number]

## 2021-09-08 NOTE — PLAN OF CARE
Problem: Falls - Risk of:  Goal: Will remain free from falls  Outcome: Met This Shift     Problem: Falls - Risk of:  Goal: Absence of physical injury  Outcome: Met This Shift     Problem: Skin Integrity:  Goal: Will show no infection signs and symptoms  Outcome: Met This Shift     Problem: Skin Integrity:  Goal: Absence of new skin breakdown  Outcome: Met This Shift     Problem: Pain:  Goal: Pain level will decrease  Outcome: Met This Shift     Problem: Pain:  Goal: Control of acute pain  Outcome: Met This Shift     Problem: Pain:  Goal: Control of chronic pain  Outcome: Met This Shift     Problem: Cardiac:  Goal: Ability to maintain adequate ventilation will improve  Outcome: Met This Shift     Problem: Cardiac:  Goal: Ability to achieve and maintain adequate cardiopulmonary perfusion will improve  Outcome: Ongoing     Problem: Activity:  Goal: Ability to tolerate increased activity will improve  Outcome: Not Met This Shift     Problem:  Activity:  Goal: Ability to maintain optimal joint mobility will improve  Outcome: Not Met This Shift     Problem: Cardiac:  Goal: Ability to maintain an adequate cardiac output will improve  Outcome: Not Met This Shift     Problem: Bleeding:  Goal: Will show no signs and symptoms of excessive bleeding  Outcome: Not Met This Shift

## 2021-09-08 NOTE — CARE COORDINATION
Ss note: 9/8/2021.4:04 PM Pt is COVID POSITIVE TODAY. Per Vanda Neely at [facility] DR TOM CHAVEZ they [place] accept pt until 10 days post positive. CM contacted pts dtr to review possible options. [facility] is currently accepting covid pts however this facility is full. Kamila Olguin in [facility] is accepting however dtr is NOT receptive to this SNF. CM is investigating SNF in [facility], awaiting return call. [facility] is not accepting covid patients. [facility] is not  accepting covid pts. Referral made to Viviana Angulo in [place], [facility] faxed will await return call and follow up with dtr for final discharge plan.  LD Miller

## 2021-09-09 LAB
ALBUMIN SERPL-MCNC: 2.6 G/DL (ref 3.5–5.2)
ALP BLD-CCNC: 76 U/L (ref 40–129)
ALT SERPL-CCNC: 12 U/L (ref 0–40)
ANION GAP SERPL CALCULATED.3IONS-SCNC: 8 MMOL/L (ref 7–16)
AST SERPL-CCNC: 17 U/L (ref 0–39)
BILIRUB SERPL-MCNC: 0.2 MG/DL (ref 0–1.2)
BUN BLDV-MCNC: 27 MG/DL (ref 6–23)
C-REACTIVE PROTEIN: 4.9 MG/DL (ref 0–0.4)
CALCIUM SERPL-MCNC: 8.3 MG/DL (ref 8.6–10.2)
CHLORIDE BLD-SCNC: 97 MMOL/L (ref 98–107)
CO2: 29 MMOL/L (ref 22–29)
CREAT SERPL-MCNC: 1.8 MG/DL (ref 0.7–1.2)
D DIMER: 1028 NG/ML DDU
FERRITIN: 928 NG/ML
FIBRINOGEN: >700 MG/DL (ref 225–540)
GFR AFRICAN AMERICAN: 43
GFR NON-AFRICAN AMERICAN: 36 ML/MIN/1.73
GLUCOSE BLD-MCNC: 108 MG/DL (ref 74–99)
HCT VFR BLD CALC: 28.4 % (ref 37–54)
HEMOGLOBIN: 9.6 G/DL (ref 12.5–16.5)
INR BLD: 1.3
LACTATE DEHYDROGENASE: 142 U/L (ref 135–225)
MAGNESIUM: 1.9 MG/DL (ref 1.6–2.6)
MCH RBC QN AUTO: 31.9 PG (ref 26–35)
MCHC RBC AUTO-ENTMCNC: 33.8 % (ref 32–34.5)
MCV RBC AUTO: 94.4 FL (ref 80–99.9)
PDW BLD-RTO: 16.7 FL (ref 11.5–15)
PHOSPHORUS: 2.8 MG/DL (ref 2.5–4.5)
PLATELET # BLD: 230 E9/L (ref 130–450)
PMV BLD AUTO: 10.8 FL (ref 7–12)
POTASSIUM SERPL-SCNC: 3.9 MMOL/L (ref 3.5–5)
PROCALCITONIN: 0.11 NG/ML (ref 0–0.08)
PROTHROMBIN TIME: 14.5 SEC (ref 9.3–12.4)
RBC # BLD: 3.01 E12/L (ref 3.8–5.8)
SODIUM BLD-SCNC: 134 MMOL/L (ref 132–146)
TOTAL PROTEIN: 6.1 G/DL (ref 6.4–8.3)
TROPONIN, HIGH SENSITIVITY: 53 NG/L (ref 0–11)
WBC # BLD: 6.7 E9/L (ref 4.5–11.5)

## 2021-09-09 PROCEDURE — 83735 ASSAY OF MAGNESIUM: CPT

## 2021-09-09 PROCEDURE — 85384 FIBRINOGEN ACTIVITY: CPT

## 2021-09-09 PROCEDURE — 80053 COMPREHEN METABOLIC PANEL: CPT

## 2021-09-09 PROCEDURE — 1200000000 HC SEMI PRIVATE

## 2021-09-09 PROCEDURE — 36415 COLL VENOUS BLD VENIPUNCTURE: CPT

## 2021-09-09 PROCEDURE — 6370000000 HC RX 637 (ALT 250 FOR IP): Performed by: NURSE PRACTITIONER

## 2021-09-09 PROCEDURE — 84145 PROCALCITONIN (PCT): CPT

## 2021-09-09 PROCEDURE — 6370000000 HC RX 637 (ALT 250 FOR IP): Performed by: INTERNAL MEDICINE

## 2021-09-09 PROCEDURE — 85027 COMPLETE CBC AUTOMATED: CPT

## 2021-09-09 PROCEDURE — 99232 SBSQ HOSP IP/OBS MODERATE 35: CPT | Performed by: INTERNAL MEDICINE

## 2021-09-09 PROCEDURE — 82728 ASSAY OF FERRITIN: CPT

## 2021-09-09 PROCEDURE — APPSS30 APP SPLIT SHARED TIME 16-30 MINUTES: Performed by: NURSE PRACTITIONER

## 2021-09-09 PROCEDURE — 85378 FIBRIN DEGRADE SEMIQUANT: CPT

## 2021-09-09 PROCEDURE — 94640 AIRWAY INHALATION TREATMENT: CPT

## 2021-09-09 PROCEDURE — 84100 ASSAY OF PHOSPHORUS: CPT

## 2021-09-09 PROCEDURE — 99231 SBSQ HOSP IP/OBS SF/LOW 25: CPT | Performed by: FAMILY MEDICINE

## 2021-09-09 PROCEDURE — 86140 C-REACTIVE PROTEIN: CPT

## 2021-09-09 PROCEDURE — 84484 ASSAY OF TROPONIN QUANT: CPT

## 2021-09-09 PROCEDURE — 6360000002 HC RX W HCPCS: Performed by: INTERNAL MEDICINE

## 2021-09-09 PROCEDURE — 83615 LACTATE (LD) (LDH) ENZYME: CPT

## 2021-09-09 PROCEDURE — 85610 PROTHROMBIN TIME: CPT

## 2021-09-09 PROCEDURE — 2580000003 HC RX 258: Performed by: INTERNAL MEDICINE

## 2021-09-09 RX ORDER — ARFORMOTEROL TARTRATE 15 UG/2ML
15 SOLUTION RESPIRATORY (INHALATION) 2 TIMES DAILY
Status: DISCONTINUED | OUTPATIENT
Start: 2021-09-09 | End: 2021-09-09

## 2021-09-09 RX ORDER — BUDESONIDE AND FORMOTEROL FUMARATE DIHYDRATE 160; 4.5 UG/1; UG/1
2 AEROSOL RESPIRATORY (INHALATION) 2 TIMES DAILY
Status: DISCONTINUED | OUTPATIENT
Start: 2021-09-09 | End: 2021-09-13 | Stop reason: HOSPADM

## 2021-09-09 RX ORDER — BUDESONIDE 0.5 MG/2ML
500 INHALANT ORAL 2 TIMES DAILY
Status: DISCONTINUED | OUTPATIENT
Start: 2021-09-09 | End: 2021-09-09 | Stop reason: CLARIF

## 2021-09-09 RX ORDER — BUDESONIDE 0.5 MG/2ML
500 INHALANT ORAL 2 TIMES DAILY
Status: DISCONTINUED | OUTPATIENT
Start: 2021-09-09 | End: 2021-09-09

## 2021-09-09 RX ORDER — ARFORMOTEROL TARTRATE 15 UG/2ML
15 SOLUTION RESPIRATORY (INHALATION) 2 TIMES DAILY
Status: DISCONTINUED | OUTPATIENT
Start: 2021-09-09 | End: 2021-09-09 | Stop reason: CLARIF

## 2021-09-09 RX ADMIN — BUDESONIDE AND FORMOTEROL FUMARATE DIHYDRATE 2 PUFF: 160; 4.5 AEROSOL RESPIRATORY (INHALATION) at 21:46

## 2021-09-09 RX ADMIN — Medication 6 MG: at 20:53

## 2021-09-09 RX ADMIN — VENLAFAXINE 37.5 MG: 37.5 TABLET ORAL at 20:53

## 2021-09-09 RX ADMIN — OXYCODONE HYDROCHLORIDE AND ACETAMINOPHEN 500 MG: 500 TABLET ORAL at 20:53

## 2021-09-09 RX ADMIN — CARVEDILOL 3.12 MG: 3.12 TABLET, FILM COATED ORAL at 17:39

## 2021-09-09 RX ADMIN — ZINC SULFATE 220 MG (50 MG) CAPSULE 50 MG: CAPSULE at 09:36

## 2021-09-09 RX ADMIN — OXYCODONE HYDROCHLORIDE AND ACETAMINOPHEN 500 MG: 500 TABLET ORAL at 09:35

## 2021-09-09 RX ADMIN — Medication 2000 UNITS: at 09:42

## 2021-09-09 RX ADMIN — Medication 10 ML: at 09:35

## 2021-09-09 RX ADMIN — DOCUSATE SODIUM 100 MG: 100 CAPSULE, LIQUID FILLED ORAL at 20:52

## 2021-09-09 RX ADMIN — Medication 10 ML: at 20:55

## 2021-09-09 RX ADMIN — ROSUVASTATIN CALCIUM 10 MG: 10 TABLET, COATED ORAL at 09:36

## 2021-09-09 RX ADMIN — PANTOPRAZOLE SODIUM 40 MG: 40 TABLET, DELAYED RELEASE ORAL at 06:31

## 2021-09-09 RX ADMIN — SACUBITRIL AND VALSARTAN 1 TABLET: 24; 26 TABLET, FILM COATED ORAL at 09:36

## 2021-09-09 RX ADMIN — CARVEDILOL 3.12 MG: 3.12 TABLET, FILM COATED ORAL at 09:36

## 2021-09-09 RX ADMIN — DONEPEZIL HYDROCHLORIDE 5 MG: 5 TABLET, ORALLY DISINTEGRATING ORAL at 20:53

## 2021-09-09 RX ADMIN — BISACODYL 10 MG: 10 SUPPOSITORY RECTAL at 09:40

## 2021-09-09 RX ADMIN — SACUBITRIL AND VALSARTAN 1 TABLET: 24; 26 TABLET, FILM COATED ORAL at 20:54

## 2021-09-09 RX ADMIN — FERROUS SULFATE TAB 325 MG (65 MG ELEMENTAL FE) 325 MG: 325 (65 FE) TAB at 09:36

## 2021-09-09 RX ADMIN — PYRIDOXINE HCL TAB 50 MG 50 MG: 50 TAB at 09:36

## 2021-09-09 RX ADMIN — DOCUSATE SODIUM 100 MG: 100 CAPSULE, LIQUID FILLED ORAL at 09:36

## 2021-09-09 RX ADMIN — DEXAMETHASONE SODIUM PHOSPHATE 6 MG: 10 INJECTION, SOLUTION INTRAMUSCULAR; INTRAVENOUS at 14:59

## 2021-09-09 RX ADMIN — VENLAFAXINE 37.5 MG: 37.5 TABLET ORAL at 09:36

## 2021-09-09 ASSESSMENT — PAIN SCALES - GENERAL
PAINLEVEL_OUTOF10: 0
PAINLEVEL_OUTOF10: 0

## 2021-09-09 NOTE — PROGRESS NOTES
Comprehensive Nutrition Assessment    Type and Reason for Visit:  Reassess    Nutrition Recommendations/Plan: Continue with diet and modify ONS Ensure Enlive TID    Nutrition Assessment:  Pt w/ acute on chronic anemia d/t suspected GI bleed. Hx CKD, CHF, CAD. Moderate malnutrition. Pt dx: 9/8 w/ CoVID 19 . Meal intakes remain suboptimal, will modify ONS and continue to monitor    Malnutrition Assessment:  Malnutrition Status: Moderate malnutrition    Context:  Chronic Illness     Findings of the 6 clinical characteristics of malnutrition:  Energy Intake:  Unable to assess  Weight Loss:  No significant weight loss     Body Fat Loss:  1 - Mild body fat loss Orbital, Buccal region, Triceps   Muscle Mass Loss:  1 - Mild muscle mass loss Temples (temporalis), Clavicles (pectoralis & deltoids)  Fluid Accumulation:  No significant fluid accumulation     Strength:  Not Performed    Estimated Daily Nutrient Needs:  Energy (kcal):   (1.3 SF); Weight Used for Energy Requirements:  Current     Protein (g):  115-140 (1.4-1.7); Weight Used for Protein Requirements:  Current        Fluid (ml/day):  ; Method Used for Fluid Requirements:  1 ml/kcal      Nutrition Related Findings:  Alert w/ confusion, abd WDL, no edema, +BS, -I/O -2.1L, renal labs elevated      Wounds:  Pressure Injury, Stage II, Unstageable, Deep Tissue Injury       Current Nutrition Therapies:    ADULT DIET;  Regular  Adult Oral Nutrition Supplement; Standard High Calorie/High Protein Oral Supplement    Anthropometric Measures:  · Height: 6' 4\" (193 cm)  · Current Body Weight: 180 lb (81.6 kg) (8/29)   · Admission Body Weight: 180 lb (81.6 kg) (8/29 bed)    · Usual Body Weight: 172 lb (78 kg) (7/15 bed, per EMR)     · Ideal Body Weight: 202 lbs; % Ideal Body Weight 89.1 %   · BMI: 21.9  · Adjusted Body Weight:  ; No Adjustment       · BMI Categories: Underweight (BMI less than 22) age over 72       Nutrition Diagnosis:   · Moderate malnutrition, In context of chronic illness related to cognitive or neurological impairment (dementia) as evidenced by poor intake prior to admission, intake 26-50%, mild muscle loss, mild loss of subcutaneous fat      Nutrition Interventions:   Food and/or Nutrient Delivery:  Continue Current Diet  Nutrition Education/Counseling:  Education not appropriate   Coordination of Nutrition Care:  Continue to monitor while inpatient    Goals:  Pt to consume >50% meals/ONS       Nutrition Monitoring and Evaluation:   Behavioral-Environmental Outcomes:  None Identified   Food/Nutrient Intake Outcomes:  Food and Nutrient Intake, Supplement Intake  Physical Signs/Symptoms Outcomes:  Biochemical Data, Fluid Status or Edema, Hemodynamic Status, Nutrition Focused Physical Findings, Skin, Weight     Discharge Planning:     Too soon to determine     Electronically signed by Romayne David, RD, LD on 9/9/21 at 10:55 AM EDT    Contact: [phone]

## 2021-09-09 NOTE — PLAN OF CARE
Problem: Pain:  Goal: Control of acute pain  Description: Control of acute pain  Outcome: Met This Shift     Problem:  Activity:  Goal: Fatigue will decrease  Description: Fatigue will decrease  Outcome: Met This Shift     Problem: Falls - Risk of:  Goal: Will remain free from falls  Description: Will remain free from falls  Outcome: Ongoing  Goal: Absence of physical injury  Description: Absence of physical injury  Outcome: Ongoing     Problem: Skin Integrity:  Goal: Will show no infection signs and symptoms  Description: Will show no infection signs and symptoms  Outcome: Ongoing  Goal: Absence of new skin breakdown  Description: Absence of new skin breakdown  Outcome: Ongoing     Problem: Cardiac:  Goal: Ability to maintain an adequate cardiac output will improve  Description: Ability to maintain an adequate cardiac output will improve  Outcome: Ongoing     Problem: Coping:  Goal: Ability to adjust to condition or change in health will improve  Description: Ability to adjust to condition or change in health will improve  Outcome: Ongoing     Problem: Airway Clearance - Ineffective  Goal: Achieve or maintain patent airway  Outcome: Ongoing     Problem: Gas Exchange - Impaired  Goal: Absence of hypoxia  Outcome: Ongoing  Goal: Promote optimal lung function  Outcome: Ongoing     Problem: Breathing Pattern - Ineffective  Goal: Ability to achieve and maintain a regular respiratory rate  Outcome: Ongoing     Problem: Isolation Precautions - Risk of Spread of Infection  Goal: Prevent transmission of infection  Outcome: Ongoing

## 2021-09-09 NOTE — ACP (ADVANCE CARE PLANNING)
Advance Care Planning     Advance Care Planning Activator (Inpatient)  Conversation Note      Date of ACP Conversation: 9/9/2021     Conversation Conducted with: Shiloh Padilla daughter    ACP Activator: Giuliano Pierce RN        Health Care Decision Maker:     Current Designated Health Care Decision Maker:     Primary Decision Maker: Joe Grant - Brother/Sister - [phone]    Secondary Decision Maker: Titi Marrero - Child - [phone]  Click here to complete Healthcare Decision Makers including section of the Healthcare Decision Maker Relationship (ie \"Primary\")      Care Preferences    Ventilation: \"If you were in your present state of health and suddenly became very ill and were unable to breathe on your own, what would your preference be about the use of a ventilator (breathing machine) if it were available to you? \"      Would the patient desire the use of ventilator (breathing machine)?: no    \"If your health worsens and it becomes clear that your chance of recovery is unlikely, what would your preference be about the use of a ventilator (breathing machine) if it were available to you? \"     Would the patient desire the use of ventilator (breathing machine)?: No      Resuscitation  \"CPR works best to restart the heart when there is a sudden event, like a heart attack, in someone who is otherwise healthy. Unfortunately, CPR does not typically restart the heart for people who have serious health conditions or who are very sick. \"    \"In the event your heart stopped as a result of an underlying serious health condition, would you want attempts to be made to restart your heart (answer \"yes\" for attempt to resuscitate) or would you prefer a natural death (answer \"no\" for do not attempt to resuscitate)? \" no       [] Yes   [x] No   Educated Patient / Slayden Prosper regarding differences between Advance Directives and portable DNR orders.     Length of ACP Conversation in minutes:  5 minutes  Conversation Outcomes:  [x] ACP discussion completed  [] Existing advance directive reviewed with patient; no changes to patient's previously recorded wishes  [] New Advance Directive completed  [] Portable Do Not Rescitate prepared for Provider review and signature  [] POLST/POST/MOLST/MOST prepared for Provider review and signature      Follow-up plan:    [] Schedule follow-up conversation to continue planning  [] Referred individual to Provider for additional questions/concerns   [] Advised patient/agent/surrogate to review completed ACP document and update if needed with changes in condition, patient preferences or care setting    [x] This note routed to one or more involved healthcare providers

## 2021-09-09 NOTE — PROGRESS NOTES
[address] [place], [place] [address]ist   Progress Note    Admitting Date and Time: 8/29/2021  9:17 AM  Admit Dx: Upper GI bleed [K92.2]    Subjective:     Patient is unable to return to [facility] for 10 days post COVID diagnosis. Other facilities were contacted and are unable to accept him at this time. He is not eating well. ROS: denies fever, chills, cp, sob, n/v, HA unless stated above.      carvedilol  3.125 mg Oral BID WC    dexamethasone  6 mg IntraVENous Q24H    vitamin B-6  50 mg Oral Daily    ascorbic acid  500 mg Oral BID    zinc sulfate  50 mg Oral Daily    Vitamin D  2,000 Units Oral Daily    lidocaine  1 patch TransDERmal Daily    pantoprazole  40 mg Oral QAM AC    bisacodyl  10 mg Rectal Daily    [Held by provider] clopidogrel  75 mg Oral Daily    docusate sodium  100 mg Oral BID    donepezil  5 mg Oral Nightly    ferrous sulfate  325 mg Oral Daily with breakfast    rosuvastatin  10 mg Oral Daily    sacubitril-valsartan  1 tablet Oral BID    torsemide  5 mg Oral Every Other Day    venlafaxine  37.5 mg Oral BID    sodium chloride flush  5-40 mL IntraVENous 2 times per day     melatonin, 6 mg, Nightly PRN  sodium chloride, , PRN  guaiFENesin, 200 mg, Q4H PRN  oxyCODONE, 2.5 mg, Q4H PRN   Or  oxyCODONE, 5 mg, Q4H PRN  sodium chloride, , PRN  sodium chloride flush, 5-40 mL, PRN  sodium chloride, 25 mL, PRN  ondansetron, 4 mg, Q8H PRN   Or  ondansetron, 4 mg, Q6H PRN  polyethylene glycol, 17 g, Daily PRN  acetaminophen, 650 mg, Q6H PRN   Or  acetaminophen, 650 mg, Q6H PRN         Objective:    BP (!) 140/58   Pulse 91   Temp 98.1 °F (36.7 °C) (Temporal)   Resp 20   Ht 6' 4\" (1.93 m)   Wt 180 lb (81.6 kg)   SpO2 93%   BMI 21.91 kg/m²   General Appearance: alert and oriented to person and in no acute distress  Skin: warm and dry, bilateral heel wounds, coccyx/sacral wounds  Head: normocephalic and atraumatic  Eyes: pupils equal, round, and reactive to light, conjunctivae normal  Neck: neck supple and non tender without mass   Pulmonary/Chest: diminished bilaterally, rhonchi, no respiratory distress  Cardiovascular: normal rate, normal S1 and S2   Abdomen: soft, non-tender, non-distended, normal bowel sounds, no masses or organomegaly  Extremities: no cyanosis, no clubbing and no edema  Neurologic: no tremor and speech normal      Recent Labs     09/07/21 0514 09/08/21 0552 09/09/21 0945    135 134   K 3.7 3.9 3.9    99 97*   CO2 25 27 29   BUN 23 24* 27*   CREATININE 1.7* 1.7* 1.8*   GLUCOSE 103* 106* 108*   CALCIUM 8.0* 8.5* 8.3*       Recent Labs     09/08/21 0552 09/09/21 0945   ALKPHOS 77 76   PROT 5.9* 6.1*   LABALBU 2.5* 2.6*   BILITOT 0.3 0.2   AST 16 17   ALT 9 12       Recent Labs     09/07/21 0514 09/07/21 0514 09/07/21  1939 09/08/21 0552 09/09/21  0945   WBC 8.6  --   --  8.0 6.7   RBC 2.20*  --   --  3.06* 3.01*   HGB 6.9*   < > 8.8* 9.6* 9.6*   HCT 21.6*   < > 26.9* 29.8* 28.4*   MCV 98.2  --   --  97.4 94.4   MCH 31.4  --   --  31.4 31.9   MCHC 31.9*  --   --  32.2 33.8   RDW 17.8*  --   --  17.1* 16.7*     --   --  260 230   MPV 11.0  --   --  11.0 10.8    < > = values in this interval not displayed. Radiology:   XR CHEST (2 VW)   Final Result   No acute process. XR HAND LEFT (MIN 3 VIEWS)   Final Result   Chronic appearing findings with prominent degenerative changes. Short-term   follow-up recommended if symptoms persist.         NM GI BLOOD LOSS   Final Result   No evidence of active GI bleeding during acquisition. US RETROPERITONEAL COMPLETE   Final Result   1. Preserved cortical thickness bilaterally. No hydronephrosis. 2.  3.7 cm left lower pole simple appearing renal cyst.      3.  Circumferentially thickened bladder wall. Nonspecific finding that may   reflect a sequela of chronic bladder outlet obstruction or potentially   chronic cystitis. Please correlate with clinical presentation.          XR endarterectomy in 2016. Continue Crestor. Plavix and Eliquis have been stopped. 6. Dementia: Continue Aricept. 7. Combined systolic and diastolic heart failure:  Last Echo on 7/19/21 with an EF of 35-40% and indeterminate diastolic dysfunction. Coreg on hold. Continue Entresto. Monitor for signs of fluid overload. Patient follows with Susan Ice at [facility]   8. Depression:  Continue venlafaxine. 9. Unstageable left heel decubitus and stage 2 sacral decubitus ulcer:  Wound Care following. 10. Recent hip replacement:  [place] [facility]  [place]. Patient is from [facility] DR TOM CHAVEZ. NOTE: This report was transcribed using voice recognition software. Every effort was made to ensure accuracy; however, inadvertent computerized transcription errors may be present.      Electronically signed by LAZARO Le CNP on 9/9/2021 at 4:35 PM

## 2021-09-09 NOTE — PROGRESS NOTES
Palliative Care Department  [phone]  Palliative Care Progress Note  Provider [address] [facility]  [id number]  Hospital Day: 12  Date of Initial Consult: 09/07/2021  Referring Provider: LAZARO Valadez CNP  Palliative Medicine was consulted for assistance with:Goals of Care and Symptom Management    HPI:   Mitchel Connor is a 80 y.o. with a past medical history of MI, CAD, HTN, HLD, GERD, COPD, CHF combined systolic/diastolic, CKD stage 3 who was admitted on 8/29/2021 from [address] [place] [address] with a CHIEF COMPLAINT of decreased hemoglobin on lab work. Patient takes Eliquis. ASSESSMENT/PLAN:     Pertinent Hospital Problems      Upper GI bleed   UTI s/p cefepime/omnicef   Sepsis secondary to UTI   Combined systolic and diastolic heart failure without decompensation   COVID-19 infection   Acute blood loss anemia in setting of anemia of chronic disease   Dementia   CKD stage 3       Palliative Care Encounter / Counseling Regarding Goals of Care  Please see detailed goals of care discussion as below   At this time, Mitchel Connor, Does Not have capacity for medical decision-making. Capacity is time limited and situation/question specific   During encounter Jer Petr was surrogate medical decision-maker   Outcome of goals of care meeting:   Code status DNR-CCA / DNI    Advanced Directives: [facility] or [facility]- dropped off on 9/7/2021    Surrogate/Legal NOK:  Dontae Chan [phone] daughter  o Crys Noriega [phone] sister or granddaughter? Spiritual assessment: no spiritual distress identified  Bereavement and grief: to be determined  Referrals to: none today     SUBJECTIVE:     Details of Conversation: Chart reviewed. D/C to [facility] not possible unless patient is 10 days post positive COVID test, referral made to Tonny Conteh in [place]. Code status established. No further acute PM needs at this time. Will sign off.     OBJECTIVE:   Prognosis: unknown and Guarded    Physical Exam: Per Staff RN reviewed  BP (!) 97/51   Pulse 93   Temp 98.9 °F (37.2 °C) (Oral)   Resp 18   Ht 6' 4\" (1.93 m)   Wt 180 lb (81.6 kg)   SpO2 96%   BMI 21.91 kg/m²   Due to COVID restrictions patient visualized through glass door, no physical exam performed. Objective data reviewed: labs, images, records, medication use, vitals and chart    Discussed patient and the plan of care with the other IDT members: Primary Team, Floor Nurse, Patient and Family    Time/Communication  Greater than 50% of time spent, total 15 minutes in counseling and coordination of care at the bedside regarding goals of care and symptom management. Thank you for allowing Palliative Medicine to participate in the care of Domonique Bartlett.

## 2021-09-09 NOTE — PROGRESS NOTES
1945 Nurse notes patient has mild confusion, disoriented to place and time; easily reoriented. Bed in lowest position with call light within reach; patient encouraged to call for needs.

## 2021-09-10 LAB
ALBUMIN SERPL-MCNC: 2.5 G/DL (ref 3.5–5.2)
ALP BLD-CCNC: 69 U/L (ref 40–129)
ALT SERPL-CCNC: 13 U/L (ref 0–40)
ANION GAP SERPL CALCULATED.3IONS-SCNC: 9 MMOL/L (ref 7–16)
AST SERPL-CCNC: 20 U/L (ref 0–39)
BILIRUB SERPL-MCNC: 0.2 MG/DL (ref 0–1.2)
BILIRUBIN DIRECT: <0.2 MG/DL (ref 0–0.3)
BILIRUBIN, INDIRECT: ABNORMAL MG/DL (ref 0–1)
BUN BLDV-MCNC: 30 MG/DL (ref 6–23)
CALCIUM SERPL-MCNC: 8.6 MG/DL (ref 8.6–10.2)
CHLORIDE BLD-SCNC: 95 MMOL/L (ref 98–107)
CO2: 29 MMOL/L (ref 22–29)
CREAT SERPL-MCNC: 1.8 MG/DL (ref 0.7–1.2)
GFR AFRICAN AMERICAN: 43
GFR NON-AFRICAN AMERICAN: 36 ML/MIN/1.73
GLUCOSE BLD-MCNC: 98 MG/DL (ref 74–99)
HCT VFR BLD CALC: 28.2 % (ref 37–54)
HEMOGLOBIN: 8.9 G/DL (ref 12.5–16.5)
MAGNESIUM: 1.9 MG/DL (ref 1.6–2.6)
MCH RBC QN AUTO: 30.8 PG (ref 26–35)
MCHC RBC AUTO-ENTMCNC: 31.6 % (ref 32–34.5)
MCV RBC AUTO: 97.6 FL (ref 80–99.9)
PDW BLD-RTO: 16.7 FL (ref 11.5–15)
PHOSPHORUS: 2.7 MG/DL (ref 2.5–4.5)
PLATELET # BLD: 221 E9/L (ref 130–450)
PMV BLD AUTO: 11.2 FL (ref 7–12)
POTASSIUM SERPL-SCNC: 3.9 MMOL/L (ref 3.5–5)
RBC # BLD: 2.89 E12/L (ref 3.8–5.8)
SODIUM BLD-SCNC: 133 MMOL/L (ref 132–146)
TOTAL PROTEIN: 5.9 G/DL (ref 6.4–8.3)
WBC # BLD: 7.1 E9/L (ref 4.5–11.5)

## 2021-09-10 PROCEDURE — 1200000000 HC SEMI PRIVATE

## 2021-09-10 PROCEDURE — 6360000002 HC RX W HCPCS: Performed by: INTERNAL MEDICINE

## 2021-09-10 PROCEDURE — 97110 THERAPEUTIC EXERCISES: CPT

## 2021-09-10 PROCEDURE — 6370000000 HC RX 637 (ALT 250 FOR IP): Performed by: INTERNAL MEDICINE

## 2021-09-10 PROCEDURE — 85027 COMPLETE CBC AUTOMATED: CPT

## 2021-09-10 PROCEDURE — 84100 ASSAY OF PHOSPHORUS: CPT

## 2021-09-10 PROCEDURE — 94640 AIRWAY INHALATION TREATMENT: CPT

## 2021-09-10 PROCEDURE — 6370000000 HC RX 637 (ALT 250 FOR IP): Performed by: NURSE PRACTITIONER

## 2021-09-10 PROCEDURE — 99231 SBSQ HOSP IP/OBS SF/LOW 25: CPT | Performed by: INTERNAL MEDICINE

## 2021-09-10 PROCEDURE — 36415 COLL VENOUS BLD VENIPUNCTURE: CPT

## 2021-09-10 PROCEDURE — 2580000003 HC RX 258: Performed by: INTERNAL MEDICINE

## 2021-09-10 PROCEDURE — 97530 THERAPEUTIC ACTIVITIES: CPT

## 2021-09-10 PROCEDURE — 83735 ASSAY OF MAGNESIUM: CPT

## 2021-09-10 PROCEDURE — 80076 HEPATIC FUNCTION PANEL: CPT

## 2021-09-10 PROCEDURE — 80048 BASIC METABOLIC PNL TOTAL CA: CPT

## 2021-09-10 RX ADMIN — Medication 10 ML: at 20:42

## 2021-09-10 RX ADMIN — SACUBITRIL AND VALSARTAN 1 TABLET: 24; 26 TABLET, FILM COATED ORAL at 09:50

## 2021-09-10 RX ADMIN — PANTOPRAZOLE SODIUM 40 MG: 40 TABLET, DELAYED RELEASE ORAL at 06:15

## 2021-09-10 RX ADMIN — Medication 6 MG: at 20:40

## 2021-09-10 RX ADMIN — PYRIDOXINE HCL TAB 50 MG 50 MG: 50 TAB at 09:51

## 2021-09-10 RX ADMIN — DONEPEZIL HYDROCHLORIDE 5 MG: 5 TABLET, ORALLY DISINTEGRATING ORAL at 20:40

## 2021-09-10 RX ADMIN — TORSEMIDE 5 MG: 10 TABLET ORAL at 09:51

## 2021-09-10 RX ADMIN — BISACODYL 10 MG: 10 SUPPOSITORY RECTAL at 09:53

## 2021-09-10 RX ADMIN — DOCUSATE SODIUM 100 MG: 100 CAPSULE, LIQUID FILLED ORAL at 20:40

## 2021-09-10 RX ADMIN — ROSUVASTATIN CALCIUM 10 MG: 10 TABLET, COATED ORAL at 09:52

## 2021-09-10 RX ADMIN — DEXAMETHASONE SODIUM PHOSPHATE 6 MG: 10 INJECTION, SOLUTION INTRAMUSCULAR; INTRAVENOUS at 14:19

## 2021-09-10 RX ADMIN — FERROUS SULFATE TAB 325 MG (65 MG ELEMENTAL FE) 325 MG: 325 (65 FE) TAB at 09:53

## 2021-09-10 RX ADMIN — Medication 10 ML: at 10:29

## 2021-09-10 RX ADMIN — CARVEDILOL 3.12 MG: 3.12 TABLET, FILM COATED ORAL at 17:21

## 2021-09-10 RX ADMIN — DOCUSATE SODIUM 100 MG: 100 CAPSULE, LIQUID FILLED ORAL at 09:51

## 2021-09-10 RX ADMIN — CARVEDILOL 3.12 MG: 3.12 TABLET, FILM COATED ORAL at 09:52

## 2021-09-10 RX ADMIN — SACUBITRIL AND VALSARTAN 1 TABLET: 24; 26 TABLET, FILM COATED ORAL at 20:40

## 2021-09-10 RX ADMIN — Medication 2000 UNITS: at 09:50

## 2021-09-10 RX ADMIN — OXYCODONE HYDROCHLORIDE AND ACETAMINOPHEN 500 MG: 500 TABLET ORAL at 20:41

## 2021-09-10 RX ADMIN — VENLAFAXINE 37.5 MG: 37.5 TABLET ORAL at 20:40

## 2021-09-10 RX ADMIN — ZINC SULFATE 220 MG (50 MG) CAPSULE 50 MG: CAPSULE at 09:53

## 2021-09-10 RX ADMIN — VENLAFAXINE 37.5 MG: 37.5 TABLET ORAL at 09:51

## 2021-09-10 RX ADMIN — BUDESONIDE AND FORMOTEROL FUMARATE DIHYDRATE 2 PUFF: 160; 4.5 AEROSOL RESPIRATORY (INHALATION) at 06:15

## 2021-09-10 RX ADMIN — OXYCODONE HYDROCHLORIDE AND ACETAMINOPHEN 500 MG: 500 TABLET ORAL at 09:53

## 2021-09-10 NOTE — ADT AUTH CERT
Utilization Reviews       Gastrointestinal Bleeding, Upper - Care Day 12 (9/9/2021) by Chin Suarez LPN       Review Status Review Entered   Completed 9/10/2021 11:10      Criteria Review      Care Day: 12 Care Date: 9/9/2021 Level of Care: Telemetry    Guideline Day 3    Clinical Status    (X) * Hemodynamic stability    ( ) * Stable Hgb/Hct    9/10/2021 11:10 AM EDT by Blake Ruiz      Hemoglobin9.6Low  12.5 - 16.5 g/dL  Hematocrit       28.4Low  37.0 - 54.0 %    (X) * Bleeding absent    9/10/2021 11:10 AM EDT by Blake Ruiz      Bleeding scan did not show any evidence of bleeding    (X) * Surgical and other acute interventions not needed    (X) * Pain absent or managed    9/10/2021 11:10 AM EDT by Laz Burnham Per note: no c/o pain    ( ) * Discharge plans and education understood    9/10/2021 11:10 AM EDT by Blake Ruiz      No Discharge    Activity    ( ) * Ambulatory or acceptable for next level of care    9/10/2021 11:10 AM EDT by Blake Ruiz      No ambulation    Routes    (X) * Oral hydration    9/10/2021 11:10 AM EDT by Blake Ruiz      ADULT DIET; Regular    (X) * Oral medications or regimen acceptable for next level of care    9/10/2021 11:10 AM EDT by Blake Ruiz      zinc sulfate (ZINCATE) capsule 50 mg    (X) * Oral diet or acceptable for next level of care    9/10/2021 11:10 AM EDT by Jacob Huntley DIET;  Regular    Interventions    (X) Hgb/Hct    9/10/2021 11:10 AM EDT by Blake Ruiz      Hemoglobin9.6Low  12.5 - 16.5 g/dL  Hematocrit       28.4Low  37.0 - 54.0 %    * Milestone   Additional Notes   9/9/21 CD 12         VS: 140/58 P91 R20 98.1 93% RA         Labs:      9/9/2021 09:45   Sodium: 134   Potassium: 3.9   Chloride: 97 (L)   CO2: 29   BUN: 27 (H)   Creatinine: 1.8 (H)   Anion Gap: 8   GFR Non-: 36   GFR : 43   Magnesium: 1.9   Glucose: 108 (H)   Calcium: 8.3 (L)   Phosphorus: 2.8   Total Protein: 6.1 (L)   Procalcitonin: 0.11 (H) CRP: 4.9 (H)   LD: 142   Troponin, High Sensitivity: 53 (H)   Albumin: 2.6 (L)   Alk Phos: 76   ALT: 12   AST: 17   Bilirubin: 0.2   WBC: 6.7   RBC: 3.01 (L)   Hemoglobin Quant: 9.6 (L)   Hematocrit: 28.4 (L)   MCV: 94.4   MCH: 31.9   MCHC: 33.8   MPV: 10.8   RDW: 16.7 (H)   Platelet Count: 280   Ferritin: 928   Prothrombin Time: 14.5 (H)   INR: 1.3   Fibrinogen: >700 (H)   D-Dimer, Quant: 1028            Medications:      budesonide-formoterol, 2 puff, Inhalation, BID   carvedilol, 3.125 mg, Oral, BID WC   dexamethasone, 6 mg, IntraVENous, Q24H   vitamin B-6, 50 mg, Oral, Daily   ascorbic acid, 500 mg, Oral, BID   zinc sulfate, 50 mg, Oral, Daily   Vitamin D, 2,000 Units, Oral, Daily   lidocaine, 1 patch, TransDERmal, Daily   pantoprazole, 40 mg, Oral, QAM AC   bisacodyl, 10 mg, Rectal, Daily   [Held by provider] clopidogrel, 75 mg, Oral, Daily   docusate sodium, 100 mg, Oral, BID   donepezil, 5 mg, Oral, Nightly   ferrous sulfate, 325 mg, Oral, Daily with breakfast   rosuvastatin, 10 mg, Oral, Daily   sacubitril-valsartan, 1 tablet, Oral, BID   torsemide, 5 mg, Oral, Every Other Day   venlafaxine, 37.5 mg, Oral, BID   sodium chloride flush, 5-40 mL, IntraVENous, 2 times per day      -------------------         **Internal Med      Patient is unable to return to [facility] for 10 days post COVID diagnosis. Other facilities were contacted and are unable to accept him at this time. Domingo Bui is not eating well.        PE:   General Appearance: alert and oriented to person and in no acute distress   Skin: warm and dry, bilateral heel wounds, coccyx/sacral wounds   Head: normocephalic and atraumatic   Eyes: pupils equal, round, and reactive to light, conjunctivae normal   Neck: neck supple and non tender without mass    Pulmonary/Chest: diminished bilaterally, rhonchi, no respiratory distress   Cardiovascular: normal rate, normal S1 and S2    Abdomen: soft, non-tender, non-distended, normal bowel sounds, no masses or organomegaly   Extremities: no cyanosis, no clubbing and no edema   Neurologic: no tremor and speech normal         Plan:   1. Acute blood loss anemia due to suspected upper GI hemorrhage:  Hgb on admit was 6.7 and he was transfused with one unit of PRBCs.  S/p EGD on 8/31/21 which did not show any source of bleeding but did show small amount of coffee ground material.  Bleeding scan did not show any evidence of bleeding  Plavix and Eliquis stopped due to recurrent GI bleeding.  Hgb on 9/7/21 was 6.9 and he received one unit of PRBCs. GI reconsulted.  A colonoscopy would be the next step but would likely require NGT tube placement. Per discussion with GI and patient's daughter a palliative care consult will be ordered.  Monitor H&H and transfuse as needed. 2. Sepsis secondary to UTI:  UA was positive Urine culture with <10,000 mixed gram negative rods, gram positive organism.  Retroperitoneal ultrasound did not show pyelonephritis but did show thickening of the bladder.  He completed a course of Cefepime/omnicef. 3. COVID 19:  Rapid COVID test was positive.  Continue Decadron.  Start aerosols, PEP/flutter, Incentive Spirometer. Remdesivir not indicated at this time as patient is not hypoxic. Continue Vitamin D, Zinc, Vitamin B-6. Vitamin C.    4. Chronic kidney disease stage 3:  Creatinine is 1.8, around baseline   5. CAD:  Intracoronary stent in 1993.  Left carotid endarterectomy in 2016.  Continue Crestor.  Plavix and Eliquis have been stopped. 6. Dementia: Continue Aricept.    7. Combined systolic and diastolic heart failure:  Last Echo on 7/19/21 with an EF of 35-40% and indeterminate diastolic dysfunction.  Coreg on hold. Continue Entresto. Monitor for signs of fluid overload. Patient [facility] with Sadia Manning at [facility]   8. Depression:  Continue venlafaxine. 9. Unstageable left heel decubitus and stage 2 sacral decubitus ulcer:  Wound Care following.     10. Recent hip replacement:  Total hip replacement performed at [facility].  PT/OT. Frank Macias is from [facility] DR TOM CHAVEZ.

## 2021-09-10 NOTE — CARE COORDINATION
Ss note: 9/10/2021.11:37 AM Pt is COVID POSITIVE 9-8-2021. ACP completed. Pt from [facility] DR TOM CHAVEZ. Dtr Prema Durand inquired with facility if she could private pay to hold pts bed for 10 days however liaison Edward Gabriel will not allow this. Etienne Reyes relays she will update the dtr on this, dtr really wants pt to return to [facility]. Etienne Reyes will continue to follow pt however relays pt must be 10 days post covid dx and then she would review for acceptance. Dtr aware that [address] [place] [address] is following as they have a covid unit however the 11 beds there are currently full. At this time there are not any other SNFs accepting covid pts in the area. RELL/MARCO A will follow.  LD Diaz

## 2021-09-10 NOTE — PROGRESS NOTES
Physical Therapy    Physical Therapy Treatment Note/Plan of Care    Room #:  8824/3620-94  Patient Name: Tete Jamison  YOB: 1932  MRN: [id number]    Date of Service: 9/10/2021     Tentative placement recommendation: Subacute rehab  Equipment recommendation: To be determined      Evaluating Physical Therapist: Alma Rosa Guillen, PT  #[id number]        Specific Provider Orders/Date/Referring Provider :  08/30/21 1000   PT eval and treat Start: 08/30/21 1000, End: 08/30/21 1000, ONE TIME, Standing Count: 1 Occurrences, R    Alberto Bodwen DO    Admitting Diagnosis:   Upper GI bleed [K92.2]       Admitted with  anemia     Patient Active Problem List   Diagnosis    Renal insufficiency    CKD (chronic kidney disease) stage 3, GFR 30-59 ml/min ([facility])    Essential hypertension    CAD (coronary artery disease), autologous vein bypass graft    Acid reflux    Anemia    Depression    Diastolic congestive heart failure (HCC)    CHF (congestive heart failure) ([address]    CAD (coronary artery disease)    COPD (chronic obstructive pulmonary disease) ([facility])    Acute respiratory failure with hypoxia ([facility])    Acute blood loss anemia    MAVIS (acute kidney injury) ([address]    Hospital-acquired pneumonia    Moderate protein-calorie malnutrition ([facility])    Upper GI bleed    Decubitus ulcer of left heel, unstageable ([address]    Pressure injury of sacral region, stage 2 ([address]    Acute cystitis with hematuria    Sepsis ([address]    Sepsis due to urinary tract infection ([address]        ASSESSMENT of Current Deficits Patient exhibits decreased strength, balance, endurance, range of motion and pain bilateral heels with open wound impairing functional mobility, transfers, gait , gait distance and tolerance to activity are barriers to d/c and require skilled intervention during hospital stay to attain pre hospital level of function.  Patient with PROM/AAROM with all exercises but verbally expressing pain with right heel, dressings covering sight. Patient needing moderate assist for bilateral lower extremities and trunk flexion for bed mobility able to hold self with minimal to supervision while seated. Patient very drowsy, needing verbal cues stating his name for eyes open and to stay awake. Patient needs continued PT to improve strength and endurance to tolerate and complete functional mobility with decreased assist required. PHYSICAL THERAPY  PLAN OF CARE       Physical therapy plan of care is established based on physician order,  patient diagnosis and clinical assessment    Current Treatment Recommendations:    -Bed Mobility: Lower extremity exercises , Upper extremity exercises  and Trunk control activities   -Sitting Balance: Incorporate reaching activities to activate trunk muscles   -Standing Balance: Perform strengthening exercises in standing to promote motor control with or without upper extremity support  and Challenge balance utilizing reaching  activities beyond center of gravity    -Transfers: Provide instruction on proper hand and foot position for adequate transfer of weight onto lower extremities and use of gait device, Cues for hand placement, technique and safety, Assist with extension of knees trunk and hip to accept weight transfer  and Provide stabilization to prevent fall   -Gait: Gait training, Standing activities to improve: base of support, weight shift, weight bearing , Exercises to improve trunk control and Exercises to improve hip and knee control   -Endurance: Utilize Supervised activities to increase level of endurance to allow for safe functional mobility including transfers and gait     PT long term treatment goals are located in below grid    Patient and or family understand(s) diagnosis, prognosis, and plan of care. Frequency of treatments: Patient will be seen  daily.          Prior Level of Function: Patient ambulated with wheeled walker    Rehab Potential: good    for baseline    Past medical history:   Past Medical History:   Diagnosis Date    CAD (coronary artery disease)     CHF (congestive heart failure) (HCC)     COPD (chronic obstructive pulmonary disease) (HCC)     GERD (gastroesophageal reflux disease)     Hyperlipidemia     Hypertension     [facility]ocardial infarction [facility] 15 yrs ago     Past Surgical History:   Procedure Laterality Date    CATARACT REMOVAL WITH IMPLANT Right 3/3/14    CATARACT REMOVAL WITH IMPLANT Left 10/13/2014    COLONOSCOPY      CORONARY ARTERY BYPASS GRAFT  15 yrs ago    3 stents    ENDOSCOPY, COLON, DIAGNOSTIC      UPPER GASTROINTESTINAL ENDOSCOPY N/A 2021    EGD ESOPHAGOGASTRODUODENOSCOPY performed by Ivette Tao MD at [facility]:    Precautions: Up with assistance, falls and alarm ,  Hip precautions left lower extremity   Social history: Patient lives with daughterMarlene  Wife  in january in a ranch home  with 2 steps  to enter with [employer] owned: Luis Alfredo Aiken,     [facility]   How much difficulty turning over in bed?: A Lot  How much difficulty sitting down on / standing up from a chair with arms?: A Lot  How much difficulty moving from lying on back to sitting on side of bed?: A Lot  How much help from another person moving to and from a bed to a chair?: A Lot  How much help from another person needed to walk in hospital room?: A Lot  How much help from another person for climbing 3-5 steps with a railing?: Total  AM-PAC Inpatient Mobility Raw Score : 11  AM-PAC Inpatient T-Scale Score : 33.86  Mobility Inpatient CMS 0-100% Score: 72.57  Mobility Inpatient CMS G-Code Modifier : CL    Nursing cleared patient for PT treatment. OBJECTIVE;   Initial Evaluation  Date: 2021 Treatment Date:    9/10/2021   Short Term/ Long Term   Goals   Was pt agreeable to Eval/treatment?  Yes Yes  To be met in 3 days   Pain level   5/10  bilateral heels No number Bed Mobility    Rolling: Minimal assist of 1    Supine to sit: Moderate assist of 1    Sit to supine: Moderate assist of 1    Scooting: Minimal assist of 1   Rolling: Moderate assist of 1   Supine to sit: Moderate assist of 1   Sit to supine: Moderate assist of 1   Scooting: Moderate assist of 1    Rolling: Independent    Supine to sit: Independent    Sit to supine: Independent    Scooting: Independent     Transfers Sit to stand: Moderate assist of 1   Sit to stand: Not assessed  unsafe at this time due to drowsiness. Sit to stand: Supervision      Ambulation    2 steps using  wheeled walker with Moderate assist of 1   for walker control, upright and weight shift and cues for safety and proper hand placement not assessed     50 feet using  wheeled walker with Supervision     Stair negotiation: ascended and descended   Not assessed  Not assessed     2 steps with rail min a   ROM Within functional limits  with exception of .   Increase range of motion 10% of affected joints    Strength BUE:  3+/5  RLE:  4/5  LLE:  3/5  Wound on heel  Increase strength in affected mm groups by 1/3 grade   Balance Sitting EOB:  good    Dynamic Standing:  poor   Sitting EOB: good   Dynamic Standing: not assessed    Sitting EOB:  good    Dynamic Standing: fair       Patient is Alert & Oriented x person, place, time and situation and follows directions  Flat affect  Sensation:  Patient  denies numbness/tingling   Edema:  yes left lower extremity   Endurance: poor tolerance to upright with fatigue       Patient education  Patient educated on role of Physical Therapy, risks of immobility, safety and plan of care,  importance of mobility while in hospital , hip precautions, safety  and positioning for skin integrity and comfort     Patient response to education:   Pt verbalized understanding Pt demonstrated skill Pt requires further education in this area   Yes Partial Yes      Treatment:  Patient practiced and was instructed/facilitated in the following treatment: Patient assisted to edge of bed. Pt  Sat edge of bed 15 minutes with Minimal assist of 1 to increase dynamic sitting balance and activity tolerance. times needing supervision, patient would tend to lean right or posterior to lay down, verbal cues needed. Performed seated exercise. Donned on prevalon boots and positioned for comfort taps on right side. Patient wanting food called dietary for patient. Therapeutic Exercises:  ankle pumps, long arc quad and seated marching, x 15 reps. At end of session, patient in bed  with alarm call light and phone within reach,  all lines and tubes intact, nursing notified. Patient would benefit from continued skilled Physical Therapy to improve functional independence and quality of life.          Patient's/ family goals   home    Time in 311  Time out  342    Total Treatment Time  31 minutes        CPT codes:    Therapeutic activities (68261)   15 minutes  1 unit(s)  Therapeutic exercises (31115)   10 minutes  1 unit(s)  Non billable time 6 minutes due to dietary    Jeanette ELIZONDO#[id number]

## 2021-09-10 NOTE — PLAN OF CARE
Problem: Malnutrition  (NI-5.2)  Goal: Food and/or Nutrient Delivery  Description: Individualized approach for food/nutrient provision. 9/9/2021 1055 by Gavin Johansen RD, LD  Outcome: Met This Shift     Problem: Falls - Risk of:  Goal: Will remain free from falls  Description: Will remain free from falls  Outcome: Ongoing  Goal: Absence of physical injury  Description: Absence of physical injury  Outcome: Ongoing     Problem: Skin Integrity:  Goal: Will show no infection signs and symptoms  Description: Will show no infection signs and symptoms  Outcome: Ongoing  Goal: Absence of new skin breakdown  Description: Absence of new skin breakdown  Outcome: Ongoing     Problem: Airway Clearance - Ineffective  Goal: Achieve or maintain patent airway  Outcome: Ongoing     Problem: Gas Exchange - Impaired  Goal: Absence of hypoxia  Outcome: Ongoing  Goal: Promote optimal lung function  Outcome: Ongoing     Problem: Breathing Pattern - Ineffective  Goal: Ability to achieve and maintain a regular respiratory rate  Outcome: Ongoing     Problem:  Body Temperature -  Risk of, Imbalanced  Goal: Ability to maintain a body temperature within defined limits  Outcome: Ongoing

## 2021-09-10 NOTE — PROGRESS NOTES
[address] [place], [place] [address]ist   Progress Note    Admitting Date and Time: 8/29/2021  9:17 AM  Admit Dx: Upper GI bleed [K92.2]    Subjective:     9/9: Patient is unable to return to [facility] for 10 days post COVID diagnosis. Other facilities were contacted and are unable to accept him at this time. He is not eating well. 9/10: Patient resting comfortably. I did not awaken. He remains on room air. ROS: denies fever, chills, cp, sob, n/v, HA unless stated above.  budesonide-formoterol  2 puff Inhalation BID    carvedilol  3.125 mg Oral BID WC    dexamethasone  6 mg IntraVENous Q24H    vitamin B-6  50 mg Oral Daily    ascorbic acid  500 mg Oral BID    zinc sulfate  50 mg Oral Daily    Vitamin D  2,000 Units Oral Daily    lidocaine  1 patch TransDERmal Daily    pantoprazole  40 mg Oral QAM AC    bisacodyl  10 mg Rectal Daily    [Held by provider] clopidogrel  75 mg Oral Daily    docusate sodium  100 mg Oral BID    donepezil  5 mg Oral Nightly    ferrous sulfate  325 mg Oral Daily with breakfast    rosuvastatin  10 mg Oral Daily    sacubitril-valsartan  1 tablet Oral BID    torsemide  5 mg Oral Every Other Day    venlafaxine  37.5 mg Oral BID    sodium chloride flush  5-40 mL IntraVENous 2 times per day     melatonin, 6 mg, Nightly PRN  sodium chloride, , PRN  guaiFENesin, 200 mg, Q4H PRN  oxyCODONE, 2.5 mg, Q4H PRN   Or  oxyCODONE, 5 mg, Q4H PRN  sodium chloride, , PRN  sodium chloride flush, 5-40 mL, PRN  sodium chloride, 25 mL, PRN  ondansetron, 4 mg, Q8H PRN   Or  ondansetron, 4 mg, Q6H PRN  polyethylene glycol, 17 g, Daily PRN  acetaminophen, 650 mg, Q6H PRN   Or  acetaminophen, 650 mg, Q6H PRN         Objective:    BP (!) 117/57   Pulse 79   Temp 97.6 °F (36.4 °C) (Oral)   Resp 18   Ht 6' 4\" (1.93 m)   Wt 180 lb (81.6 kg)   SpO2 95%   BMI 21.91 kg/m²   Patient asleep and I did not awaken. He was sleeping comfortably in no acute respiratory distress.     Recent Labs 09/08/21  0552 09/09/21  0945 09/10/21  0931    134 133   K 3.9 3.9 3.9   CL 99 97* 95*   CO2 27 29 29   BUN 24* 27* 30*   CREATININE 1.7* 1.8* 1.8*   GLUCOSE 106* 108* 98   CALCIUM 8.5* 8.3* 8.6       Recent Labs     09/08/21  0552 09/09/21  0945 09/10/21  0931   ALKPHOS 77 76 69   PROT 5.9* 6.1* 5.9*   LABALBU 2.5* 2.6* 2.5*   BILITOT 0.3 0.2 0.2   AST 16 17 20   ALT 9 12 13       Recent Labs     09/08/21 0552 09/09/21 0945 09/10/21  0931   WBC 8.0 6.7 7.1   RBC 3.06* 3.01* 2.89*   HGB 9.6* 9.6* 8.9*   HCT 29.8* 28.4* 28.2*   MCV 97.4 94.4 97.6   MCH 31.4 31.9 30.8   MCHC 32.2 33.8 31.6*   RDW 17.1* 16.7* 16.7*    230 221   MPV 11.0 10.8 11.2       Radiology:   XR CHEST (2 VW)   Final Result   No acute process. XR HAND LEFT (MIN 3 VIEWS)   Final Result   Chronic appearing findings with prominent degenerative changes. Short-term   follow-up recommended if symptoms persist.         NM GI BLOOD LOSS   Final Result   No evidence of active GI bleeding during acquisition. US RETROPERITONEAL COMPLETE   Final Result   1. Preserved cortical thickness bilaterally. No hydronephrosis. 2.  3.7 cm left lower pole simple appearing renal cyst.      3.  Circumferentially thickened bladder wall. Nonspecific finding that may   reflect a sequela of chronic bladder outlet obstruction or potentially   chronic cystitis. Please correlate with clinical presentation. XR CHEST (2 VW)   Final Result   No radiographic evidence of acute cardiopulmonary disease.              Assessment:  Principal Problem:    Upper GI bleed  Active Problems:    CKD (chronic kidney disease) stage 3, GFR 30-59 ml/min ([facility])    CAD (coronary artery disease), autologous vein bypass graft    CHF (congestive heart failure) ([facility])    Acute blood loss anemia    Moderate protein-calorie malnutrition ([facility])    Decubitus ulcer of left heel, unstageable (H[facility])    Pressure injury of sacral region, stage 2 (HCC)    Acute cystitis with hematuria    Sepsis ([facility]    Sepsis due to urinary tract infection ([facility]  Resolved Problems:    * No resolved hospital problems. *      Plan:  1. Acute blood loss anemia due to suspected upper GI hemorrhage:  Hgb on admit was 6.7 and he was transfused with one unit of PRBCs. S/p EGD on 8/31/21 which did not show any source of bleeding but did show small amount of coffee ground material.  Bleeding scan did not show any evidence of bleeding  Plavix and Eliquis stopped due to recurrent GI bleeding. Hgb on 9/7/21 was 6.9 and he received one unit of PRBCs. GI reconsulted. A colonoscopy would be the next step but would likely require NGT tube placement. Per discussion with GI and patient's daughter a palliative care consult will be ordered. Monitor H&H and transfuse as needed. 2. Sepsis secondary to UTI:  UA was positive Urine culture with <10,000 mixed gram negative rods, gram positive organism. Retroperitoneal ultrasound did not show pyelonephritis but did show thickening of the bladder. He completed a course of Cefepime/omnicef. 3. COVID 19:  Rapid COVID test was positive. Continue Decadron. Start aerosols, PEP/flutter, Incentive Spirometer. Remdesivir not indicated at this time as patient is not hypoxic. Continue Vitamin D, Zinc, Vitamin B-6. Vitamin C.   4. Chronic kidney disease stage 3:  Creatinine is 1.8, around baseline  5. CAD:  Intracoronary stent in 1993. Left carotid endarterectomy in 2016. Continue Crestor. Plavix and Eliquis have been stopped. 6. Dementia: Continue Aricept. 7. Combined systolic and diastolic heart failure:  Last Echo on 7/19/21 with an EF of 35-40% and indeterminate diastolic dysfunction. Coreg on hold. Continue Entresto. Monitor for signs of fluid overload. Patient follows with Stacey Walters at [facility]   8. Depression:  Continue venlafaxine. 9. Unstageable left heel decubitus and stage 2 sacral decubitus ulcer:  Wound Care following.    10. Recent hip replacement:  Total hip replacement performed at [facility].  PT/OT. Patient is from [facility] DR TOM CHAVEZ. NOTE: This report was transcribed using voice recognition software. Every effort was made to ensure accuracy; however, inadvertent computerized transcription errors may be present.      Electronically signed by Rizwana Fung MD on 9/10/2021 at 4:39 PM

## 2021-09-11 LAB
ALBUMIN SERPL-MCNC: 3 G/DL (ref 3.5–5.2)
ALP BLD-CCNC: 88 U/L (ref 40–129)
ALT SERPL-CCNC: 24 U/L (ref 0–40)
ANION GAP SERPL CALCULATED.3IONS-SCNC: 9 MMOL/L (ref 7–16)
AST SERPL-CCNC: 29 U/L (ref 0–39)
BILIRUB SERPL-MCNC: 0.2 MG/DL (ref 0–1.2)
BILIRUBIN DIRECT: <0.2 MG/DL (ref 0–0.3)
BILIRUBIN, INDIRECT: ABNORMAL MG/DL (ref 0–1)
BLOOD CULTURE, ROUTINE: NORMAL
BUN BLDV-MCNC: 36 MG/DL (ref 6–23)
CALCIUM SERPL-MCNC: 9.3 MG/DL (ref 8.6–10.2)
CHLORIDE BLD-SCNC: 101 MMOL/L (ref 98–107)
CO2: 30 MMOL/L (ref 22–29)
CREAT SERPL-MCNC: 1.8 MG/DL (ref 0.7–1.2)
CULTURE, BLOOD 2: NORMAL
GFR AFRICAN AMERICAN: 43
GFR NON-AFRICAN AMERICAN: 36 ML/MIN/1.73
GLUCOSE BLD-MCNC: 111 MG/DL (ref 74–99)
HCT VFR BLD CALC: 31.9 % (ref 37–54)
HEMOGLOBIN: 10.1 G/DL (ref 12.5–16.5)
INR BLD: 1.2
MAGNESIUM: 2 MG/DL (ref 1.6–2.6)
MCH RBC QN AUTO: 31.4 PG (ref 26–35)
MCHC RBC AUTO-ENTMCNC: 31.7 % (ref 32–34.5)
MCV RBC AUTO: 99.1 FL (ref 80–99.9)
PDW BLD-RTO: 16.5 FL (ref 11.5–15)
PHOSPHORUS: 3 MG/DL (ref 2.5–4.5)
PLATELET # BLD: 227 E9/L (ref 130–450)
PMV BLD AUTO: 11 FL (ref 7–12)
POTASSIUM SERPL-SCNC: 4.4 MMOL/L (ref 3.5–5)
PROTHROMBIN TIME: 13.4 SEC (ref 9.3–12.4)
RBC # BLD: 3.22 E12/L (ref 3.8–5.8)
SODIUM BLD-SCNC: 140 MMOL/L (ref 132–146)
TOTAL PROTEIN: 6.5 G/DL (ref 6.4–8.3)
WBC # BLD: 8.3 E9/L (ref 4.5–11.5)

## 2021-09-11 PROCEDURE — 85610 PROTHROMBIN TIME: CPT

## 2021-09-11 PROCEDURE — 94640 AIRWAY INHALATION TREATMENT: CPT

## 2021-09-11 PROCEDURE — 85027 COMPLETE CBC AUTOMATED: CPT

## 2021-09-11 PROCEDURE — 6370000000 HC RX 637 (ALT 250 FOR IP): Performed by: INTERNAL MEDICINE

## 2021-09-11 PROCEDURE — 80076 HEPATIC FUNCTION PANEL: CPT

## 2021-09-11 PROCEDURE — 2580000003 HC RX 258: Performed by: INTERNAL MEDICINE

## 2021-09-11 PROCEDURE — 84100 ASSAY OF PHOSPHORUS: CPT

## 2021-09-11 PROCEDURE — APPSS30 APP SPLIT SHARED TIME 16-30 MINUTES: Performed by: NURSE PRACTITIONER

## 2021-09-11 PROCEDURE — 2580000003 HC RX 258: Performed by: NURSE PRACTITIONER

## 2021-09-11 PROCEDURE — 6360000002 HC RX W HCPCS: Performed by: INTERNAL MEDICINE

## 2021-09-11 PROCEDURE — 99232 SBSQ HOSP IP/OBS MODERATE 35: CPT | Performed by: INTERNAL MEDICINE

## 2021-09-11 PROCEDURE — 93005 ELECTROCARDIOGRAM TRACING: CPT | Performed by: NURSE PRACTITIONER

## 2021-09-11 PROCEDURE — 36415 COLL VENOUS BLD VENIPUNCTURE: CPT

## 2021-09-11 PROCEDURE — 1200000000 HC SEMI PRIVATE

## 2021-09-11 PROCEDURE — 80048 BASIC METABOLIC PNL TOTAL CA: CPT

## 2021-09-11 PROCEDURE — 6370000000 HC RX 637 (ALT 250 FOR IP): Performed by: NURSE PRACTITIONER

## 2021-09-11 PROCEDURE — 83735 ASSAY OF MAGNESIUM: CPT

## 2021-09-11 RX ORDER — SODIUM CHLORIDE 9 MG/ML
INJECTION, SOLUTION INTRAVENOUS CONTINUOUS
Status: DISCONTINUED | OUTPATIENT
Start: 2021-09-11 | End: 2021-09-13 | Stop reason: HOSPADM

## 2021-09-11 RX ADMIN — SACUBITRIL AND VALSARTAN 1 TABLET: 24; 26 TABLET, FILM COATED ORAL at 08:39

## 2021-09-11 RX ADMIN — PANTOPRAZOLE SODIUM 40 MG: 40 TABLET, DELAYED RELEASE ORAL at 06:04

## 2021-09-11 RX ADMIN — SODIUM CHLORIDE: 9 INJECTION, SOLUTION INTRAVENOUS at 17:39

## 2021-09-11 RX ADMIN — CARVEDILOL 3.12 MG: 3.12 TABLET, FILM COATED ORAL at 17:37

## 2021-09-11 RX ADMIN — BUDESONIDE AND FORMOTEROL FUMARATE DIHYDRATE 2 PUFF: 160; 4.5 AEROSOL RESPIRATORY (INHALATION) at 07:30

## 2021-09-11 RX ADMIN — BUDESONIDE AND FORMOTEROL FUMARATE DIHYDRATE 2 PUFF: 160; 4.5 AEROSOL RESPIRATORY (INHALATION) at 18:54

## 2021-09-11 RX ADMIN — PYRIDOXINE HCL TAB 50 MG 50 MG: 50 TAB at 08:38

## 2021-09-11 RX ADMIN — Medication 5 ML: at 22:39

## 2021-09-11 RX ADMIN — Medication 10 ML: at 08:45

## 2021-09-11 RX ADMIN — ROSUVASTATIN CALCIUM 10 MG: 10 TABLET, COATED ORAL at 08:38

## 2021-09-11 RX ADMIN — Medication 2000 UNITS: at 08:38

## 2021-09-11 RX ADMIN — DOCUSATE SODIUM 100 MG: 100 CAPSULE, LIQUID FILLED ORAL at 20:59

## 2021-09-11 RX ADMIN — SACUBITRIL AND VALSARTAN 1 TABLET: 24; 26 TABLET, FILM COATED ORAL at 21:01

## 2021-09-11 RX ADMIN — DOCUSATE SODIUM 100 MG: 100 CAPSULE, LIQUID FILLED ORAL at 08:38

## 2021-09-11 RX ADMIN — DEXAMETHASONE SODIUM PHOSPHATE 6 MG: 10 INJECTION, SOLUTION INTRAMUSCULAR; INTRAVENOUS at 17:37

## 2021-09-11 RX ADMIN — DONEPEZIL HYDROCHLORIDE 5 MG: 5 TABLET, ORALLY DISINTEGRATING ORAL at 21:01

## 2021-09-11 RX ADMIN — FERROUS SULFATE TAB 325 MG (65 MG ELEMENTAL FE) 325 MG: 325 (65 FE) TAB at 08:38

## 2021-09-11 RX ADMIN — VENLAFAXINE 37.5 MG: 37.5 TABLET ORAL at 21:01

## 2021-09-11 RX ADMIN — OXYCODONE HYDROCHLORIDE AND ACETAMINOPHEN 500 MG: 500 TABLET ORAL at 08:38

## 2021-09-11 RX ADMIN — VENLAFAXINE 37.5 MG: 37.5 TABLET ORAL at 08:38

## 2021-09-11 RX ADMIN — ZINC SULFATE 220 MG (50 MG) CAPSULE 50 MG: CAPSULE at 08:38

## 2021-09-11 RX ADMIN — OXYCODONE HYDROCHLORIDE AND ACETAMINOPHEN 500 MG: 500 TABLET ORAL at 20:59

## 2021-09-11 RX ADMIN — CARVEDILOL 3.12 MG: 3.12 TABLET, FILM COATED ORAL at 08:39

## 2021-09-11 RX ADMIN — BISACODYL 10 MG: 10 SUPPOSITORY RECTAL at 08:39

## 2021-09-11 ASSESSMENT — ENCOUNTER SYMPTOMS: TACHYPNEA: 1

## 2021-09-11 ASSESSMENT — PAIN SCALES - GENERAL
PAINLEVEL_OUTOF10: 0
PAINLEVEL_OUTOF10: 0

## 2021-09-11 NOTE — PROGRESS NOTES
[place] [address] Hospitalist   Progress Note    Admitting Date and Time: 8/29/2021  9:17 AM  Admit Dx: Upper GI bleed [K92.2]    Subjective:     Patient reports that he is not eating or drinking well. He was encouraged to drink his nutritional supplement. Patient had 6 beats of V tach last night and was aymptomatic. ROS: denies fever, chills, cp, sob, n/v, HA unless stated above.      budesonide-formoterol  2 puff Inhalation BID    carvedilol  3.125 mg Oral BID WC    dexamethasone  6 mg IntraVENous Q24H    vitamin B-6  50 mg Oral Daily    ascorbic acid  500 mg Oral BID    zinc sulfate  50 mg Oral Daily    Vitamin D  2,000 Units Oral Daily    lidocaine  1 patch TransDERmal Daily    pantoprazole  40 mg Oral QAM AC    bisacodyl  10 mg Rectal Daily    [Held by provider] clopidogrel  75 mg Oral Daily    docusate sodium  100 mg Oral BID    donepezil  5 mg Oral Nightly    ferrous sulfate  325 mg Oral Daily with breakfast    rosuvastatin  10 mg Oral Daily    sacubitril-valsartan  1 tablet Oral BID    torsemide  5 mg Oral Every Other Day    venlafaxine  37.5 mg Oral BID    sodium chloride flush  5-40 mL IntraVENous 2 times per day     melatonin, 6 mg, Nightly PRN  sodium chloride, , PRN  guaiFENesin, 200 mg, Q4H PRN  oxyCODONE, 2.5 mg, Q4H PRN   Or  oxyCODONE, 5 mg, Q4H PRN  sodium chloride, , PRN  sodium chloride flush, 5-40 mL, PRN  sodium chloride, 25 mL, PRN  ondansetron, 4 mg, Q8H PRN   Or  ondansetron, 4 mg, Q6H PRN  polyethylene glycol, 17 g, Daily PRN  acetaminophen, 650 mg, Q6H PRN   Or  acetaminophen, 650 mg, Q6H PRN         Objective:    BP (!) 103/59   Pulse 80   Temp 98.1 °F (36.7 °C) (Oral)   Resp 20   Ht 6' 4\" (1.93 m)   Wt 180 lb (81.6 kg)   SpO2 96%   BMI 21.91 kg/m²   General Appearance: alert and oriented to person and in no acute distress  Skin: warm and dry, bilateral heel wounds, coccyx/sacral wounds  Head: normocephalic and atraumatic  Eyes: pupils equal, round, and reactive to light, conjunctivae normal  Neck: neck supple and non tender without mass   Pulmonary/Chest: diminished bilaterally, rhonchi, no respiratory distress  Cardiovascular: normal rate, normal S1 and S2   Abdomen: soft, non-tender, non-distended, normal bowel sounds, no masses or organomegaly  Extremities: no cyanosis, no clubbing and no edema  Neurologic: no tremor and speech normal      Recent Labs     09/09/21  0945 09/10/21  0931    133   K 3.9 3.9   CL 97* 95*   CO2 29 29   BUN 27* 30*   CREATININE 1.8* 1.8*   GLUCOSE 108* 98   CALCIUM 8.3* 8.6       Recent Labs     09/09/21  0945 09/10/21  0931   ALKPHOS 76 69   PROT 6.1* 5.9*   LABALBU 2.6* 2.5*   BILITOT 0.2 0.2   AST 17 20   ALT 12 13       Recent Labs     09/09/21  0945 09/10/21  0931 09/11/21  1427   WBC 6.7 7.1 8.3   RBC 3.01* 2.89* 3.22*   HGB 9.6* 8.9* 10.1*   HCT 28.4* 28.2* 31.9*   MCV 94.4 97.6 99.1   MCH 31.9 30.8 31.4   MCHC 33.8 31.6* 31.7*   RDW 16.7* 16.7* 16.5*    221 227   MPV 10.8 11.2 11.0       Radiology:   XR CHEST (2 VW)   Final Result   No acute process. XR HAND LEFT (MIN 3 VIEWS)   Final Result   Chronic appearing findings with prominent degenerative changes. Short-term   follow-up recommended if symptoms persist.         NM GI BLOOD LOSS   Final Result   No evidence of active GI bleeding during acquisition. US RETROPERITONEAL COMPLETE   Final Result   1. Preserved cortical thickness bilaterally. No hydronephrosis. 2.  3.7 cm left lower pole simple appearing renal cyst.      3.  Circumferentially thickened bladder wall. Nonspecific finding that may   reflect a sequela of chronic bladder outlet obstruction or potentially   chronic cystitis. Please correlate with clinical presentation. XR CHEST (2 VW)   Final Result   No radiographic evidence of acute cardiopulmonary disease.              Assessment:  Principal Problem:    Upper GI bleed  Active Problems:    CKD (chronic kidney disease) stage 3, GFR 30-59 ml/min ([facility])    CAD (coronary artery disease), autologous vein bypass graft    CHF (congestive heart failure) ([facility])    Acute blood loss anemia    Moderate protein-calorie malnutrition ([facility])    Decubitus ulcer of left heel, unstageable ([facility])    Pressure injury of sacral region, stage 2 ([facility]    Acute cystitis with hematuria    Sepsis ([facility]    Sepsis due to urinary tract infection ([facility]  Resolved Problems:    * No resolved hospital problems. *      Plan:  1. Acute blood loss anemia due to suspected upper GI hemorrhage:   S/p EGD on 8/31/21 which did not show any source of bleeding but did show small amount of coffee ground material.  Bleeding scan was negative  Plavix and Eliquis stopped due to recurrent GI bleeding. He has received 2 units of PRBCs this admission. 2. Sepsis secondary to UTI:  UA was positive Urine culture with <10,000 mixed gram negative rods, gram positive organism. Retroperitoneal ultrasound did not show pyelonephritis but did show thickening of the bladder. He completed a course of Cefepime/omnicef. 3. COVID 19:  Rapid COVID test was positive. Continue Decadron, aerosols, PEP/flutter, Incentive Spirometer. Remdesivir not indicated at this time as patient is not hypoxic. Continue Vitamin D, Zinc, Vitamin B-6. Vitamin C.   4. Non-sustained V tach:  Patient had 6 beats of non-sustained V tach last evening. He was asymptomatic. Awaiting electrolytes. 5. Chronic kidney disease stage 3:  Creatinine is 1.8, around baseline  6. CAD:  Intracoronary stent in 1993. Left carotid endarterectomy in 2016. Continue Crestor. Plavix and Eliquis have been stopped. 7. Dementia: Continue Aricept. 8. Combined systolic and diastolic heart failure:  Last Echo on 7/19/21 with an EF of 35-40% and indeterminate diastolic dysfunction. Coreg on hold. Continue Entresto. Monitor for signs of fluid overload. Patient follows with Liana Burris at [facility]   9.  Depression: Continue venlafaxine. 10. Unstageable left heel decubitus and stage 2 sacral decubitus ulcer:  Wound Care following. 11. Recent hip replacement:  Total hip replacement performed at [facility].  PT/OT. Patient is from [facility] DR TOM CHAVEZ. 12. Discharge plan:  Patient was to be discharged to SNF on 9/8/21 but tested positive for COVID. Patient cannot return to [facility] until 10 days post COVID diagnosis. Social Work has contacted other facilities that take [facility] patients and there are no beds available,. NOTE: This report was transcribed using voice recognition software. Every effort was made to ensure accuracy; however, inadvertent computerized transcription errors may be present.      Electronically signed by LAZARO Dunbar CNP on 9/11/2021 at 3:09 PM

## 2021-09-11 NOTE — PLAN OF CARE
Problem: Falls - Risk of:  Goal: Will remain free from falls  Description: Will remain free from falls  Outcome: Met This Shift  Goal: Absence of physical injury  Description: Absence of physical injury  Outcome: Met This Shift     Problem: Skin Integrity:  Goal: Will show no infection signs and symptoms  Description: Will show no infection signs and symptoms  Outcome: Met This Shift  Goal: Absence of new skin breakdown  Description: Absence of new skin breakdown  Outcome: Met This Shift     Problem: Pain:  Goal: Pain level will decrease  Description: Pain level will decrease  Outcome: Met This Shift  Goal: Control of acute pain  Description: Control of acute pain  Outcome: Met This Shift  Goal: Control of chronic pain  Description: Control of chronic pain  Outcome: Met This Shift     Problem: Bleeding:  Goal: Will show no signs and symptoms of excessive bleeding  Description: Will show no signs and symptoms of excessive bleeding  Outcome: Met This Shift     Problem: Airway Clearance - Ineffective  Goal: Achieve or maintain patent airway  Outcome: Met This Shift     Problem: Gas Exchange - Impaired  Goal: Absence of hypoxia  Outcome: Met This Shift  Goal: Promote optimal lung function  Outcome: Met This Shift     Problem: Breathing Pattern - Ineffective  Goal: Ability to achieve and maintain a regular respiratory rate  Outcome: Met This Shift     Problem:  Body Temperature -  Risk of, Imbalanced  Goal: Ability to maintain a body temperature within defined limits  Outcome: Met This Shift     Problem: Patient Education: Go to Patient Education Activity  Goal: Patient/Family Education  Outcome: Met This Shift

## 2021-09-11 NOTE — PROGRESS NOTES
[facility] called and faxed over strip showing patient had six beats of Vtach, with underlying Sinus Rhythm, on the monitor. Patient assessed, vitals taken. Patient resting quietly in room, denied any complaints of pain. Will pass information on to day-shift RN today. Will continue to monitor the patient.     Electronically signed by Chayo Laboy RN on 9/11/2021 at 2:51 AM

## 2021-09-11 NOTE — PROGRESS NOTES
Physician Progress Note      PATIENT:               Sherrell Pelletier  CSN #:                  [id number]  :                       1932  ADMIT DATE:       2021 9:17 AM  DISCH DATE:  RESPONDING  PROVIDER #:        Patricia Delcid DO          QUERY TEXT:    Pt admitted with GI bleed, possible UTI. Pt noted to have Sepsis. If possible,   please document in progress notes and discharge summary the present on   admission status of Sepsis:    The medical record reflects the following:  Risk Factors: GI bleed, MAVIS, malnutrition, possible UTI, pressure ulcers POA. Clinical Indicators: -10.2,  12.2, - 21.0, - 28.9, UC <10,000   cfu mixed , HR 90's . Treatment: IV Cefipime added .     Thank you, Linnea Romero RN [facility] [phone]  Options provided:  -- Yes, Sepsis was present at the time of the order to admit to the hospital  -- No, Sepsis was not present on admission and developed during the inpatient   stay  -- Other - I will add my own diagnosis  -- Disagree - Not applicable / Not valid  -- Disagree - Clinically unable to determine / Unknown  -- Refer to Clinical Documentation Reviewer    PROVIDER RESPONSE TEXT:    Yes, sepsis was present at the time of the order to admit to the hospital.    Query created by: Kaushik Sarah on 2021 11:59 AM      Electronically signed by:  Patricia Delcid DO 2021 7:47 AM

## 2021-09-12 LAB
ALBUMIN SERPL-MCNC: 2.5 G/DL (ref 3.5–5.2)
ALP BLD-CCNC: 85 U/L (ref 40–129)
ALT SERPL-CCNC: 25 U/L (ref 0–40)
ANION GAP SERPL CALCULATED.3IONS-SCNC: 7 MMOL/L (ref 7–16)
AST SERPL-CCNC: 26 U/L (ref 0–39)
BILIRUB SERPL-MCNC: 0.3 MG/DL (ref 0–1.2)
BUN BLDV-MCNC: 35 MG/DL (ref 6–23)
CALCIUM SERPL-MCNC: 8.6 MG/DL (ref 8.6–10.2)
CHLORIDE BLD-SCNC: 98 MMOL/L (ref 98–107)
CO2: 30 MMOL/L (ref 22–29)
CREAT SERPL-MCNC: 1.5 MG/DL (ref 0.7–1.2)
GFR AFRICAN AMERICAN: 53
GFR NON-AFRICAN AMERICAN: 44 ML/MIN/1.73
GLUCOSE BLD-MCNC: 88 MG/DL (ref 74–99)
HCT VFR BLD CALC: 30.1 % (ref 37–54)
HEMOGLOBIN: 9.4 G/DL (ref 12.5–16.5)
MAGNESIUM: 1.7 MG/DL (ref 1.6–2.6)
MCH RBC QN AUTO: 30.9 PG (ref 26–35)
MCHC RBC AUTO-ENTMCNC: 31.2 % (ref 32–34.5)
MCV RBC AUTO: 99 FL (ref 80–99.9)
PDW BLD-RTO: 16.2 FL (ref 11.5–15)
PHOSPHORUS: 2.3 MG/DL (ref 2.5–4.5)
PLATELET # BLD: 206 E9/L (ref 130–450)
PMV BLD AUTO: 10.8 FL (ref 7–12)
POTASSIUM SERPL-SCNC: 3.9 MMOL/L (ref 3.5–5)
RBC # BLD: 3.04 E12/L (ref 3.8–5.8)
SODIUM BLD-SCNC: 135 MMOL/L (ref 132–146)
TOTAL PROTEIN: 5.9 G/DL (ref 6.4–8.3)
WBC # BLD: 7.9 E9/L (ref 4.5–11.5)

## 2021-09-12 PROCEDURE — 99232 SBSQ HOSP IP/OBS MODERATE 35: CPT | Performed by: INTERNAL MEDICINE

## 2021-09-12 PROCEDURE — 6360000002 HC RX W HCPCS: Performed by: NURSE PRACTITIONER

## 2021-09-12 PROCEDURE — 6370000000 HC RX 637 (ALT 250 FOR IP): Performed by: INTERNAL MEDICINE

## 2021-09-12 PROCEDURE — 84100 ASSAY OF PHOSPHORUS: CPT

## 2021-09-12 PROCEDURE — 36415 COLL VENOUS BLD VENIPUNCTURE: CPT

## 2021-09-12 PROCEDURE — 80053 COMPREHEN METABOLIC PANEL: CPT

## 2021-09-12 PROCEDURE — APPSS30 APP SPLIT SHARED TIME 16-30 MINUTES: Performed by: NURSE PRACTITIONER

## 2021-09-12 PROCEDURE — 2500000003 HC RX 250 WO HCPCS: Performed by: NURSE PRACTITIONER

## 2021-09-12 PROCEDURE — 6360000002 HC RX W HCPCS: Performed by: INTERNAL MEDICINE

## 2021-09-12 PROCEDURE — 1200000000 HC SEMI PRIVATE

## 2021-09-12 PROCEDURE — 94640 AIRWAY INHALATION TREATMENT: CPT

## 2021-09-12 PROCEDURE — 2580000003 HC RX 258: Performed by: NURSE PRACTITIONER

## 2021-09-12 PROCEDURE — 2580000003 HC RX 258: Performed by: INTERNAL MEDICINE

## 2021-09-12 PROCEDURE — 85027 COMPLETE CBC AUTOMATED: CPT

## 2021-09-12 PROCEDURE — 6370000000 HC RX 637 (ALT 250 FOR IP): Performed by: NURSE PRACTITIONER

## 2021-09-12 PROCEDURE — 83735 ASSAY OF MAGNESIUM: CPT

## 2021-09-12 RX ORDER — MAGNESIUM SULFATE IN WATER 40 MG/ML
2000 INJECTION, SOLUTION INTRAVENOUS ONCE
Status: COMPLETED | OUTPATIENT
Start: 2021-09-12 | End: 2021-09-12

## 2021-09-12 RX ORDER — DEXAMETHASONE SODIUM PHOSPHATE 4 MG/ML
6 INJECTION, SOLUTION INTRA-ARTICULAR; INTRALESIONAL; INTRAMUSCULAR; INTRAVENOUS; SOFT TISSUE EVERY 24 HOURS
Status: DISCONTINUED | OUTPATIENT
Start: 2021-09-12 | End: 2021-09-13 | Stop reason: HOSPADM

## 2021-09-12 RX ADMIN — Medication 10 ML: at 20:29

## 2021-09-12 RX ADMIN — Medication 2000 UNITS: at 10:02

## 2021-09-12 RX ADMIN — VENLAFAXINE 37.5 MG: 37.5 TABLET ORAL at 20:28

## 2021-09-12 RX ADMIN — ROSUVASTATIN CALCIUM 10 MG: 10 TABLET, COATED ORAL at 10:02

## 2021-09-12 RX ADMIN — ZINC SULFATE 220 MG (50 MG) CAPSULE 50 MG: CAPSULE at 10:03

## 2021-09-12 RX ADMIN — BUDESONIDE AND FORMOTEROL FUMARATE DIHYDRATE 2 PUFF: 160; 4.5 AEROSOL RESPIRATORY (INHALATION) at 06:06

## 2021-09-12 RX ADMIN — DOCUSATE SODIUM 100 MG: 100 CAPSULE, LIQUID FILLED ORAL at 10:02

## 2021-09-12 RX ADMIN — SACUBITRIL AND VALSARTAN 1 TABLET: 24; 26 TABLET, FILM COATED ORAL at 20:29

## 2021-09-12 RX ADMIN — FERROUS SULFATE TAB 325 MG (65 MG ELEMENTAL FE) 325 MG: 325 (65 FE) TAB at 10:03

## 2021-09-12 RX ADMIN — BUDESONIDE AND FORMOTEROL FUMARATE DIHYDRATE 2 PUFF: 160; 4.5 AEROSOL RESPIRATORY (INHALATION) at 18:12

## 2021-09-12 RX ADMIN — DEXAMETHASONE SODIUM PHOSPHATE 6 MG: 4 INJECTION, SOLUTION INTRAMUSCULAR; INTRAVENOUS at 21:15

## 2021-09-12 RX ADMIN — SACUBITRIL AND VALSARTAN 1 TABLET: 24; 26 TABLET, FILM COATED ORAL at 10:03

## 2021-09-12 RX ADMIN — POTASSIUM PHOSPHATE, MONOBASIC AND POTASSIUM PHOSPHATE, DIBASIC 10 MMOL: 224; 236 INJECTION, SOLUTION, CONCENTRATE INTRAVENOUS at 20:28

## 2021-09-12 RX ADMIN — OXYCODONE HYDROCHLORIDE AND ACETAMINOPHEN 500 MG: 500 TABLET ORAL at 20:29

## 2021-09-12 RX ADMIN — PANTOPRAZOLE SODIUM 40 MG: 40 TABLET, DELAYED RELEASE ORAL at 05:29

## 2021-09-12 RX ADMIN — OXYCODONE HYDROCHLORIDE AND ACETAMINOPHEN 500 MG: 500 TABLET ORAL at 10:03

## 2021-09-12 RX ADMIN — VENLAFAXINE 37.5 MG: 37.5 TABLET ORAL at 10:02

## 2021-09-12 RX ADMIN — MAGNESIUM SULFATE HEPTAHYDRATE 2000 MG: 40 INJECTION, SOLUTION INTRAVENOUS at 17:52

## 2021-09-12 RX ADMIN — CARVEDILOL 3.12 MG: 3.12 TABLET, FILM COATED ORAL at 18:57

## 2021-09-12 RX ADMIN — DONEPEZIL HYDROCHLORIDE 5 MG: 5 TABLET, ORALLY DISINTEGRATING ORAL at 20:28

## 2021-09-12 RX ADMIN — Medication 10 ML: at 10:04

## 2021-09-12 RX ADMIN — CARVEDILOL 3.12 MG: 3.12 TABLET, FILM COATED ORAL at 10:02

## 2021-09-12 RX ADMIN — SODIUM CHLORIDE: 9 INJECTION, SOLUTION INTRAVENOUS at 17:51

## 2021-09-12 RX ADMIN — DOCUSATE SODIUM 100 MG: 100 CAPSULE, LIQUID FILLED ORAL at 20:29

## 2021-09-12 RX ADMIN — BISACODYL 10 MG: 10 SUPPOSITORY RECTAL at 10:03

## 2021-09-12 RX ADMIN — PYRIDOXINE HCL TAB 50 MG 50 MG: 50 TAB at 10:02

## 2021-09-12 ASSESSMENT — PAIN SCALES - GENERAL
PAINLEVEL_OUTOF10: 0
PAINLEVEL_OUTOF10: 0

## 2021-09-12 NOTE — PROGRESS NOTES
no acute distress  Skin: warm and dry, bilateral heel wounds, coccyx/sacral wounds  Head: normocephalic and atraumatic  Eyes: pupils equal, round, and reactive to light, conjunctivae normal  Neck: neck supple and non tender without mass   Pulmonary/Chest: diminished bilaterally, rhonchi, no respiratory distress  Cardiovascular: normal rate, normal S1 and S2   Abdomen: soft, non-tender, non-distended, normal bowel sounds, no masses or organomegaly  Extremities: no cyanosis, no clubbing and no edema  Neurologic: no tremor and speech normal      Recent Labs     09/10/21  0931 09/11/21  1427 09/12/21  0855    140 135   K 3.9 4.4 3.9   CL 95* 101 98   CO2 29 30* 30*   BUN 30* 36* 35*   CREATININE 1.8* 1.8* 1.5*   GLUCOSE 98 111* 88   CALCIUM 8.6 9.3 8.6       Recent Labs     09/10/21  0931 09/11/21 1427 09/12/21  0855   ALKPHOS 69 88 85   PROT 5.9* 6.5 5.9*   LABALBU 2.5* 3.0* 2.5*   BILITOT 0.2 0.2 0.3   AST 20 29 26   ALT 13 24 25       Recent Labs     09/10/21  0931 09/11/21  1427 09/12/21  0855   WBC 7.1 8.3 7.9   RBC 2.89* 3.22* 3.04*   HGB 8.9* 10.1* 9.4*   HCT 28.2* 31.9* 30.1*   MCV 97.6 99.1 99.0   MCH 30.8 31.4 30.9   MCHC 31.6* 31.7* 31.2*   RDW 16.7* 16.5* 16.2*    227 206   MPV 11.2 11.0 10.8       Radiology:   XR CHEST (2 VW)   Final Result   No acute process. XR HAND LEFT (MIN 3 VIEWS)   Final Result   Chronic appearing findings with prominent degenerative changes. Short-term   follow-up recommended if symptoms persist.         NM GI BLOOD LOSS   Final Result   No evidence of active GI bleeding during acquisition. US RETROPERITONEAL COMPLETE   Final Result   1. Preserved cortical thickness bilaterally. No hydronephrosis. 2.  3.7 cm left lower pole simple appearing renal cyst.      3.  Circumferentially thickened bladder wall. Nonspecific finding that may   reflect a sequela of chronic bladder outlet obstruction or potentially   chronic cystitis.   Please correlate with clinical presentation. XR CHEST (2 VW)   Final Result   No radiographic evidence of acute cardiopulmonary disease. Assessment:  Principal Problem:    Upper GI bleed  Active Problems:    CKD (chronic kidney disease) stage 3, GFR 30-59 ml/min ([facility])    CAD (coronary artery disease), autologous vein bypass graft    CHF (congestive heart failure) ([facility])    Acute blood loss anemia    Moderate protein-calorie malnutrition ([facility])    Decubitus ulcer of left heel, unstageable ([facility])    Pressure injury of sacral region, stage 2 ([facility])    Acute cystitis with hematuria    Sepsis ([address]    Sepsis due to urinary tract infection ([address]  Resolved Problems:    * No resolved hospital problems. *      Plan:  1. Acute blood loss anemia due to suspected upper GI hemorrhage:   S/p EGD on 8/31/21 which did not show any source of bleeding but did show small amount of coffee ground material.  Bleeding scan was negative  Plavix and Eliquis stopped due to recurrent GI bleeding. He has received 2 units of PRBCs this admission. 2. Sepsis secondary to UTI:  UA was positive Urine culture with <10,000 mixed gram negative rods, gram positive organism. Retroperitoneal ultrasound did not show pyelonephritis but did show thickening of the bladder. He completed a course of Cefepime/omnicef. 3. COVID 19:  Rapid COVID test was positive. Continue Decadron, aerosols, PEP/flutter, Incentive Spirometer. Remdesivir not indicated at this time as patient is not hypoxic. Continue Vitamin D, Zinc, Vitamin B-6. Vitamin C.   4. Non-sustained V tach:  Patient had 6 beats of non-sustained V tach on 9/10. He was asymptomatic. Keep Mg greater than 2 and Potassium greater than 4.   5. Chronic kidney disease stage 3:  Creatinine is 1.8, around baseline  6. CAD:  Intracoronary stent in 1993. Left carotid endarterectomy in 2016. Continue Crestor. Plavix and Eliquis have been stopped. 7. Dementia: Continue Aricept.    8. Combined systolic and diastolic heart failure:  Last Echo on 7/19/21 with an EF of 35-40% and indeterminate diastolic dysfunction. Continue Entresto and Coreg. Monitor for signs of fluid overload. Patient follows with Essence Zurita at [facility]   9. Depression:  Continue venlafaxine. 10. Unstageable left heel decubitus and stage 2 sacral decubitus ulcer:  Wound Care following. 11. Recent hip replacement:  [facility].  PT/OT. Patient is from [facility] DR TOM CHAVEZ. 12. Inadequate oral intake:  Continue nutritional supplements. Gentle IV fluids. Demadex on hold. 13. Discharge plan:  Patient was to be discharged to SNF on 9/8/21 but tested positive for COVID. Patient cannot return to [facility] until 10 days post COVID diagnosis. Social Work has contacted other facilities that take [facility] patients and there are no beds available. NOTE: This report was transcribed using voice recognition software. Every effort was made to ensure accuracy; however, inadvertent computerized transcription errors may be present.      Electronically signed by LAZARO Stiles CNP on 9/12/2021 at 2:27 PM

## 2021-09-13 VITALS
WEIGHT: 180 LBS | OXYGEN SATURATION: 95 % | RESPIRATION RATE: 21 BRPM | BODY MASS INDEX: 21.92 KG/M2 | TEMPERATURE: 97.7 F | HEART RATE: 77 BPM | DIASTOLIC BLOOD PRESSURE: 55 MMHG | SYSTOLIC BLOOD PRESSURE: 115 MMHG | HEIGHT: 76 IN

## 2021-09-13 LAB
ANION GAP SERPL CALCULATED.3IONS-SCNC: 7 MMOL/L (ref 7–16)
BUN BLDV-MCNC: 33 MG/DL (ref 6–23)
CALCIUM SERPL-MCNC: 8.4 MG/DL (ref 8.6–10.2)
CHLORIDE BLD-SCNC: 100 MMOL/L (ref 98–107)
CO2: 29 MMOL/L (ref 22–29)
CREAT SERPL-MCNC: 1.5 MG/DL (ref 0.7–1.2)
GFR AFRICAN AMERICAN: 53
GFR NON-AFRICAN AMERICAN: 44 ML/MIN/1.73
GLUCOSE BLD-MCNC: 110 MG/DL (ref 74–99)
HCT VFR BLD CALC: 26.4 % (ref 37–54)
HCT VFR BLD CALC: 27 % (ref 37–54)
HEMOGLOBIN: 8.3 G/DL (ref 12.5–16.5)
HEMOGLOBIN: 8.6 G/DL (ref 12.5–16.5)
MAGNESIUM: 2.1 MG/DL (ref 1.6–2.6)
MCH RBC QN AUTO: 31.4 PG (ref 26–35)
MCHC RBC AUTO-ENTMCNC: 31.9 % (ref 32–34.5)
MCV RBC AUTO: 98.5 FL (ref 80–99.9)
PDW BLD-RTO: 16.1 FL (ref 11.5–15)
PHOSPHORUS: 2.5 MG/DL (ref 2.5–4.5)
PLATELET # BLD: 190 E9/L (ref 130–450)
PMV BLD AUTO: 10.8 FL (ref 7–12)
POTASSIUM SERPL-SCNC: 4.3 MMOL/L (ref 3.5–5)
RBC # BLD: 2.74 E12/L (ref 3.8–5.8)
SODIUM BLD-SCNC: 136 MMOL/L (ref 132–146)
WBC # BLD: 6 E9/L (ref 4.5–11.5)

## 2021-09-13 PROCEDURE — 6370000000 HC RX 637 (ALT 250 FOR IP): Performed by: INTERNAL MEDICINE

## 2021-09-13 PROCEDURE — 85027 COMPLETE CBC AUTOMATED: CPT

## 2021-09-13 PROCEDURE — 85014 HEMATOCRIT: CPT

## 2021-09-13 PROCEDURE — 80048 BASIC METABOLIC PNL TOTAL CA: CPT

## 2021-09-13 PROCEDURE — 85018 HEMOGLOBIN: CPT

## 2021-09-13 PROCEDURE — 84100 ASSAY OF PHOSPHORUS: CPT

## 2021-09-13 PROCEDURE — 6370000000 HC RX 637 (ALT 250 FOR IP): Performed by: NURSE PRACTITIONER

## 2021-09-13 PROCEDURE — 83735 ASSAY OF MAGNESIUM: CPT

## 2021-09-13 PROCEDURE — 2580000003 HC RX 258: Performed by: NURSE PRACTITIONER

## 2021-09-13 PROCEDURE — 97530 THERAPEUTIC ACTIVITIES: CPT

## 2021-09-13 PROCEDURE — 94640 AIRWAY INHALATION TREATMENT: CPT

## 2021-09-13 PROCEDURE — 97110 THERAPEUTIC EXERCISES: CPT

## 2021-09-13 PROCEDURE — APPSS45 APP SPLIT SHARED TIME 31-45 MINUTES: Performed by: NURSE PRACTITIONER

## 2021-09-13 PROCEDURE — 99239 HOSP IP/OBS DSCHRG MGMT >30: CPT | Performed by: STUDENT IN AN ORGANIZED HEALTH CARE EDUCATION/TRAINING PROGRAM

## 2021-09-13 PROCEDURE — 36415 COLL VENOUS BLD VENIPUNCTURE: CPT

## 2021-09-13 RX ORDER — ZINC SULFATE 50(220)MG
50 CAPSULE ORAL DAILY
Qty: 30 CAPSULE | Refills: 0 | DISCHARGE
Start: 2021-09-14

## 2021-09-13 RX ORDER — CARVEDILOL 3.12 MG/1
3.12 TABLET ORAL 2 TIMES DAILY WITH MEALS
Qty: 60 TABLET | Refills: 0 | DISCHARGE
Start: 2021-09-13

## 2021-09-13 RX ORDER — BUDESONIDE AND FORMOTEROL FUMARATE DIHYDRATE 160; 4.5 UG/1; UG/1
2 AEROSOL RESPIRATORY (INHALATION) 2 TIMES DAILY
Qty: 10.2 G | Refills: 0 | DISCHARGE
Start: 2021-09-13

## 2021-09-13 RX ORDER — ASCORBIC ACID 500 MG
500 TABLET ORAL 2 TIMES DAILY
Qty: 30 TABLET | Refills: 0 | DISCHARGE
Start: 2021-09-13

## 2021-09-13 RX ORDER — PYRIDOXINE HCL (VITAMIN B6) 50 MG
50 TABLET ORAL DAILY
Qty: 30 TABLET | Refills: 0 | DISCHARGE
Start: 2021-09-14

## 2021-09-13 RX ORDER — CHOLECALCIFEROL (VITAMIN D3) 50 MCG
2000 TABLET ORAL DAILY
Qty: 30 TABLET | DISCHARGE
Start: 2021-09-14

## 2021-09-13 RX ORDER — DEXAMETHASONE SODIUM PHOSPHATE 4 MG/ML
6 INJECTION, SOLUTION INTRA-ARTICULAR; INTRALESIONAL; INTRAMUSCULAR; INTRAVENOUS; SOFT TISSUE EVERY 24 HOURS
Qty: 7.5 ML | Refills: 0 | DISCHARGE
Start: 2021-09-13 | End: 2021-09-18

## 2021-09-13 RX ORDER — PANTOPRAZOLE SODIUM 40 MG/1
40 TABLET, DELAYED RELEASE ORAL
Qty: 30 TABLET | Refills: 3 | DISCHARGE
Start: 2021-09-13

## 2021-09-13 RX ORDER — POLYETHYLENE GLYCOL 3350 17 G/17G
17 POWDER, FOR SOLUTION ORAL DAILY PRN
Qty: 527 G | Refills: 0 | DISCHARGE
Start: 2021-09-13 | End: 2021-10-13

## 2021-09-13 RX ORDER — LIDOCAINE 4 G/G
1 PATCH TOPICAL DAILY
DISCHARGE
Start: 2021-09-13

## 2021-09-13 RX ADMIN — ROSUVASTATIN CALCIUM 10 MG: 10 TABLET, COATED ORAL at 09:47

## 2021-09-13 RX ADMIN — BUDESONIDE AND FORMOTEROL FUMARATE DIHYDRATE 2 PUFF: 160; 4.5 AEROSOL RESPIRATORY (INHALATION) at 12:24

## 2021-09-13 RX ADMIN — DOCUSATE SODIUM 100 MG: 100 CAPSULE, LIQUID FILLED ORAL at 09:47

## 2021-09-13 RX ADMIN — PYRIDOXINE HCL TAB 50 MG 50 MG: 50 TAB at 09:47

## 2021-09-13 RX ADMIN — OXYCODONE HYDROCHLORIDE AND ACETAMINOPHEN 500 MG: 500 TABLET ORAL at 09:47

## 2021-09-13 RX ADMIN — FERROUS SULFATE TAB 325 MG (65 MG ELEMENTAL FE) 325 MG: 325 (65 FE) TAB at 09:47

## 2021-09-13 RX ADMIN — Medication 2000 UNITS: at 09:47

## 2021-09-13 RX ADMIN — CARVEDILOL 3.12 MG: 3.12 TABLET, FILM COATED ORAL at 09:47

## 2021-09-13 RX ADMIN — SACUBITRIL AND VALSARTAN 1 TABLET: 24; 26 TABLET, FILM COATED ORAL at 09:47

## 2021-09-13 RX ADMIN — VENLAFAXINE 37.5 MG: 37.5 TABLET ORAL at 09:47

## 2021-09-13 RX ADMIN — OXYCODONE 5 MG: 5 TABLET ORAL at 09:47

## 2021-09-13 RX ADMIN — PANTOPRAZOLE SODIUM 40 MG: 40 TABLET, DELAYED RELEASE ORAL at 06:09

## 2021-09-13 RX ADMIN — BISACODYL 10 MG: 10 SUPPOSITORY RECTAL at 09:49

## 2021-09-13 RX ADMIN — BUDESONIDE AND FORMOTEROL FUMARATE DIHYDRATE 2 PUFF: 160; 4.5 AEROSOL RESPIRATORY (INHALATION) at 06:32

## 2021-09-13 RX ADMIN — CARVEDILOL 3.12 MG: 3.12 TABLET, FILM COATED ORAL at 17:58

## 2021-09-13 RX ADMIN — ZINC SULFATE 220 MG (50 MG) CAPSULE 50 MG: CAPSULE at 09:47

## 2021-09-13 RX ADMIN — SODIUM CHLORIDE: 9 INJECTION, SOLUTION INTRAVENOUS at 09:50

## 2021-09-13 ASSESSMENT — PAIN DESCRIPTION - ONSET: ONSET: ON-GOING

## 2021-09-13 ASSESSMENT — PAIN SCALES - GENERAL: PAINLEVEL_OUTOF10: 7

## 2021-09-13 ASSESSMENT — PAIN DESCRIPTION - LOCATION: LOCATION: GENERALIZED

## 2021-09-13 ASSESSMENT — PAIN DESCRIPTION - PROGRESSION: CLINICAL_PROGRESSION: NOT CHANGED

## 2021-09-13 ASSESSMENT — PAIN DESCRIPTION - DESCRIPTORS: DESCRIPTORS: ACHING

## 2021-09-13 ASSESSMENT — PAIN DESCRIPTION - FREQUENCY: FREQUENCY: INTERMITTENT

## 2021-09-13 ASSESSMENT — PAIN DESCRIPTION - PAIN TYPE: TYPE: CHRONIC PAIN

## 2021-09-13 NOTE — PROGRESS NOTES
Physical Therapy    Physical Therapy Treatment Note/Plan of Care    Room #:  3513/5664-28  Patient Name: Corbin Murphy  YOB: 1932  MRN: [id number]    Date of Service: 9/13/2021     Tentative placement recommendation: Subacute rehab  Equipment recommendation: To be determined      Evaluating Physical Therapist: Anne Poe, PT  #[id number]        Specific Provider Orders/Date/Referring Provider :  08/30/21 1000   PT eval and treat Start: 08/30/21 1000, End: 08/30/21 1000, ONE TIME, Standing Count: 1 Occurrences, R    Alberto Bowden DO    Admitting Diagnosis:   Upper GI bleed [K92.2]       Admitted with  anemia     Patient Active Problem List   Diagnosis    Renal insufficiency    CKD (chronic kidney disease) stage 3, GFR 30-59 ml/min ([facility])    Essential hypertension    CAD (coronary artery disease), autologous vein bypass graft    Acid reflux    Anemia    Depression    Diastolic congestive heart failure ([facility])    CHF (congestive heart failure) ([address]    CAD (coronary artery disease)    COPD (chronic obstructive pulmonary disease) ([facility])    Acute respiratory failure with hypoxia ([facility])    Acute blood loss anemia    MAVIS (acute kidney injury) ([address]    Hospital-acquired pneumonia    Moderate protein-calorie malnutrition ([facility])    Upper GI bleed    Decubitus ulcer of left heel, unstageable ([address]    Pressure injury of sacral region, stage 2 ([address]    Acute cystitis with hematuria    Sepsis ([address]    Sepsis due to urinary tract infection ([address]        ASSESSMENT of Current Deficits Patient exhibits decreased strength, balance, endurance, range of motion and pain bilateral heels with open wound impairing functional mobility, transfers, gait , gait distance and tolerance to activity are barriers to d/c and require skilled intervention during hospital stay to attain pre hospital level of function. Patient with AAROM/AROM with all exercises but verbally expressing pain with left leg movements. Patient needing moderate assist for rolling and afterwards, he was to tired to try and sit up. Patient needs continued PT to improve strength and endurance to tolerate and complete functional mobility with decreased assist required. PHYSICAL THERAPY  PLAN OF CARE       Physical therapy plan of care is established based on physician order,  patient diagnosis and clinical assessment    Current Treatment Recommendations:    -Bed Mobility: Lower extremity exercises , Upper extremity exercises  and Trunk control activities   -Sitting Balance: Incorporate reaching activities to activate trunk muscles   -Standing Balance: Perform strengthening exercises in standing to promote motor control with or without upper extremity support  and Challenge balance utilizing reaching  activities beyond center of gravity    -Transfers: Provide instruction on proper hand and foot position for adequate transfer of weight onto lower extremities and use of gait device, Cues for hand placement, technique and safety, Assist with extension of knees trunk and hip to accept weight transfer  and Provide stabilization to prevent fall   -Gait: Gait training, Standing activities to improve: base of support, weight shift, weight bearing , Exercises to improve trunk control and Exercises to improve hip and knee control   -Endurance: Utilize Supervised activities to increase level of endurance to allow for safe functional mobility including transfers and gait     PT long term treatment goals are located in below grid    Patient and or family understand(s) diagnosis, prognosis, and plan of care. Frequency of treatments: Patient will be seen  daily.          Prior Level of Function: Patient ambulated with wheeled walker    Rehab Potential: good    for baseline    Past medical history:   Past Medical History:   Diagnosis Date    CAD (coronary artery disease)     CHF (congestive heart failure) (HCC)     [facility]     GERD (gastroesophageal reflux disease)     Hyperlipidemia     Hypertension     Myocardial infarction ([facility] 15 yrs ago     Past Surgical History:   Procedure Laterality Date    CATARACT REMOVAL WITH IMPLANT Right 3/3/14    CATARACT REMOVAL WITH IMPLANT Left 10/13/2014    COLONOSCOPY      CORONARY ARTERY BYPASS GRAFT  15 yrs ago    3 stents    ENDOSCOPY, COLON, DIAGNOSTIC      UPPER GASTROINTESTINAL ENDOSCOPY N/A 2021    EGD ESOPHAGOGASTRODUODENOSCOPY performed by Andre Carias MD at [address] [place] [address]:    Precautions: Up with assistance, falls and alarm ,  Hip precautions left lower extremity   Social history: Patient lives with daughterLiv  Wife  in january in a ranch home  with 2 steps  to enter with [employer] owned: Luis Alfredo Burr,     [address] [facility]   How much difficulty turning over in bed?: A Lot  How much difficulty sitting down on / standing up from a chair with arms?: A Lot  How much difficulty moving from lying on back to sitting on side of bed?: A Lot  How much help from another person moving to and from a bed to a chair?: A Lot  How much help from another person needed to walk in hospital room?: A Lot  How much help from another person for climbing 3-5 steps with a railing?: Total  AM-PAC Inpatient Mobility Raw Score : 11  AM-PAC Inpatient T-Scale Score : 33.86  Mobility Inpatient CMS 0-100% Score: 72.57  Mobility Inpatient CMS G-Code Modifier : CL    Nursing cleared patient for PT treatment. OBJECTIVE;   Initial Evaluation  Date: 2021 Treatment Date:    2021   Short Term/ Long Term   Goals   Was pt agreeable to Eval/treatment? Yes Yes  To be met in 3 days   Pain level   5/10  bilateral heels No number   Left leg with movement    Bed Mobility    Rolling: Minimal assist of 1    Supine to sit: Moderate assist of 1    Sit to supine:  Moderate assist of 1    Scooting: Minimal assist of 1   Rolling: Moderate assist of 1   Supine to sit: Not assessed    Sit to supine: Not assessed    Scooting: Not assessed     Rolling: Independent    Supine to sit: Independent    Sit to supine: Independent    Scooting: Independent     Transfers Sit to stand: Moderate assist of 1   Sit to stand: Not assessed     Sit to stand: Supervision      Ambulation    2 steps using  wheeled walker with Moderate assist of 1   for walker control, upright and weight shift and cues for safety and proper hand placement not assessed     50 feet using  wheeled walker with Supervision     Stair negotiation: ascended and descended   Not assessed  Not assessed     2 steps with rail min a   ROM Within functional limits  with exception of . Increase range of motion 10% of affected joints    Strength BUE:  3+/5  RLE:  4/5  LLE:  3/5  Wound on heel  Increase strength in affected mm groups by 1/3 grade   Balance Sitting EOB:  good    Dynamic Standing:  poor   Sitting EOB: not assessed   Dynamic Standing: not assessed    Sitting EOB:  good    Dynamic Standing: fair       Patient is Alert & Oriented x person, place, time and situation and follows directions  Flat affect  Sensation:  Patient  denies numbness/tingling   Edema:  yes left lower extremity   Endurance: poor tolerance to upright with fatigue       Patient education  Patient educated on role of Physical Therapy, risks of immobility, safety and plan of care,  importance of mobility while in hospital , hip precautions, safety  and positioning for skin integrity and comfort     Patient response to education:   Pt verbalized understanding Pt demonstrated skill Pt requires further education in this area   Yes Partial Yes      Treatment:  Patient practiced and was instructed/facilitated in the following treatment: Patient performed supine exercises.  Pt was incontinent of BM, nursing came in and I assisted her with rolling for personal hygiene, changing and straightened out bed linens. Donned on prevalon boots and positioned for comfort  on right side. Therapeutic Exercises:  ankle pumps, quad sets, glut sets, heel slide, hip abduction/adduction and straight leg raise, x 15 - 20 reps. At end of session, patient in bed  with alarm call light and phone within reach,  all lines and tubes intact, nursing notified. Patient would benefit from continued skilled Physical Therapy to improve functional independence and quality of life. Patient's/ family goals   home    Time in 2:50  Time out  3:13    Total Treatment Time  23 minutes        CPT codes:    Therapeutic activities (68425)   9 minutes  1 unit(s)  Therapeutic exercises (42918)   14 minutes  1 unit(s)     Ernie Gallegos  [facility]  LIC # [id number]

## 2021-09-13 NOTE — CARE COORDINATION
SS Note: RE[facility] arranged for 6:30PM [facility] ambulance to transport pt to [facility], [address] [place] [address], Fax [phone], for a skilled level of care. RELL has been in contact with pt's daughter Luis Mckeon this afternoon and she was in agreement with plan and is also aware of transportation arrangement.   Electronically signed by LD Azevedo on 9/13/2021 at 5:46 PM

## 2021-09-13 NOTE — PLAN OF CARE
Problem: Falls - Risk of:  Goal: Will remain free from falls  Description: Will remain free from falls  Outcome: Met This Shift  Goal: Absence of physical injury  Description: Absence of physical injury  Outcome: Met This Shift     Problem: Skin Integrity:  Goal: Will show no infection signs and symptoms  Description: Will show no infection signs and symptoms  Outcome: Met This Shift  Goal: Absence of new skin breakdown  Description: Absence of new skin breakdown  Outcome: Met This Shift     Problem: Pain:  Goal: Pain level will decrease  Description: Pain level will decrease  Outcome: Met This Shift  Goal: Control of acute pain  Description: Control of acute pain  Outcome: Met This Shift  Goal: Control of chronic pain  Description: Control of chronic pain  Outcome: Met This Shift     Problem:  Activity:  Goal: Fatigue will decrease  Description: Fatigue will decrease  Outcome: Met This Shift  Goal: Ability to tolerate increased activity will improve  Description: Ability to tolerate increased activity will improve  Outcome: Met This Shift  Goal: Ability to maintain optimal joint mobility will improve  Description: Ability to maintain optimal joint mobility will improve  Outcome: Met This Shift     Problem: Cardiac:  Goal: Ability to maintain an adequate cardiac output will improve  Description: Ability to maintain an adequate cardiac output will improve  Outcome: Met This Shift     Problem: Cardiac:  Goal: Ability to maintain an adequate cardiac output will improve  Description: Ability to maintain an adequate cardiac output will improve  Outcome: Met This Shift  Goal: Ability to maintain adequate ventilation will improve  Description: Ability to maintain adequate ventilation will improve  Outcome: Met This Shift  Goal: Ability to achieve and maintain adequate cardiopulmonary perfusion will improve  Description: Ability to achieve and maintain adequate cardiopulmonary perfusion will improve  Outcome: Met This Shift     Problem: Coping:  Goal: Ability to adjust to condition or change in health will improve  Description: Ability to adjust to condition or change in health will improve  Outcome: Met This Shift     Problem: Bleeding:  Goal: Will show no signs and symptoms of excessive bleeding  Description: Will show no signs and symptoms of excessive bleeding  Outcome: Met This Shift     Problem: Fluid Volume:  Goal: Ability to achieve a balanced intake and output will improve  Description: Ability to achieve a balanced intake and output will improve  Outcome: Met This Shift

## 2021-09-13 NOTE — DISCHARGE SUMMARY
left heel, unstageable (HCC)    Pressure injury of sacral region, stage 2 ([address]    Acute cystitis with hematuria    Sepsis ([address]    Sepsis due to urinary tract infection ([address]  Resolved Problems:    * No resolved hospital problems. *      Consults:  IP CONSULT TO GI  IP CONSULT TO GI  IP CONSULT TO PALLIATIVE CARE    Procedures: EGD with biopsy     Hospital Course: Patient was admitted with Upper GI bleed [K92.2]. Patient is a 80-year-old male who presented to the ED with complaints of anemia with a hemoglobin of 6.3 per nursing home labs. Patient was admitted with upper GI bleed. During patient lengthy hospital stay patient received multiple blood transfusions for upper GI bleed. Patient prior to his admission was on Plavix, Eliquis, and aspirin. On 8/31/2021 patient underwent a EGD with biopsy and which did not show any source of bleeding but did show small amount of coffee-ground material. At that time patient had a bleeding scan which was also negative all the patient's anticoagulation were stopped at the time of admission and have not been resumed. Patient was also treated for sepsis secondary to urinary tract infection. Patient received IV antibiotics and he completed a course of IV cefepime and Omnicef. Patient was being prepared to go back to [facility] when he had a rapid Covid test that was positive for COVID-19. Patient was started on the Decadron, aerosol treatments, remdesivir was not indicated at this time due to patient was not hypoxic. Patient received vitamin D, zinc, vitamin B 6 and vitamin C which will be continued for the next 14 days. On 910 patient had 6 beats of nonsustained V. tach, patient was asymptomatic. Patient's magnesium was kept between 1.9 and 2 and potassium greater than 4. Patient does have a history of dementia for which she receives Aricept daily.  Patient currently is afebrile and stable, therefore he will be discharged to [facility] with the following medications and instruction. Discharge Exam:  Vitals:    09/11/21 2102 09/12/21 0745 09/12/21 1845 09/13/21 0930   BP: (!) 99/56 (!) 112/55 (!) 106/57 124/66   Pulse: 77 79 78 85   Resp: 19 20 20 21   Temp: 97.7 °F (36.5 °C) 98.1 °F (36.7 °C) 98.5 °F (36.9 °C) 97.7 °F (36.5 °C)   TempSrc: Oral Oral Oral Oral   SpO2: 96% 95% 96% 95%   Weight:       Height:         General Appearance: alert and oriented to person, with some intermittent confusion to place and time and in no acute distress  Skin: warm and dry, left heel wound, heel protector boots on, coccyx decubitus   Head: normocephalic and atraumatic  Eyes: pupils equal, round, and reactive to light, conjunctivae normal  Neck: neck supple and non tender without mass   Pulmonary/Chest: diminished throughout to auscultation bilaterally, normal air movement, no respiratory distress  Cardiovascular: normal rate, normal S1 and S2 and no carotid bruits  Abdomen: soft, non-tender, non-distended, normal bowel sounds   Extremities: no cyanosis, no clubbing and no edema  Neurologic: no cranial nerve deficit and speech normal    I/O last 3 completed shifts:  In: -   Out: 500 [Urine:500]  No intake/output data recorded. LABS:  Recent Labs     09/11/21  1427 09/12/21  0855 09/13/21  0857    135 136   K 4.4 3.9 4.3    98 100   CO2 30* 30* 29   BUN 36* 35* 33*   CREATININE 1.8* 1.5* 1.5*   GLUCOSE 111* 88 110*   CALCIUM 9.3 8.6 8.4*       Recent Labs     09/11/21  1427 09/11/21  1427 09/12/21  0855 09/13/21  0857 09/13/21  1415   WBC 8.3  --  7.9 6.0  --    RBC 3.22*  --  3.04* 2.74*  --    HGB 10.1*   < > 9.4* 8.6* 8.3*   HCT 31.9*   < > 30.1* 27.0* 26.4*   MCV 99.1  --  99.0 98.5  --    MCH 31.4  --  30.9 31.4  --    MCHC 31.7*  --  31.2* 31.9*  --    RDW 16.5*  --  16.2* 16.1*  --      --  206 190  --    MPV 11.0  --  10.8 10.8  --     < > = values in this interval not displayed. No results for input(s): POCGLU in the last 72 hours.       Imaging:   XR CHEST (2 VW)   Final Result   No acute process. XR HAND LEFT (MIN 3 VIEWS)   Final Result   Chronic appearing findings with prominent degenerative changes. Short-term   follow-up recommended if symptoms persist.         NM GI BLOOD LOSS   Final Result   No evidence of active GI bleeding during acquisition. US RETROPERITONEAL COMPLETE   Final Result   1. Preserved cortical thickness bilaterally. No hydronephrosis. 2.  3.7 cm left lower pole simple appearing renal cyst.      3.  Circumferentially thickened bladder wall. Nonspecific finding that may   reflect a sequela of chronic bladder outlet obstruction or potentially   chronic cystitis. Please correlate with clinical presentation. XR CHEST (2 VW)   Final Result   No radiographic evidence of acute cardiopulmonary disease.              Patient Instructions:      Medication List      START taking these medications    ascorbic acid 500 MG tablet  Commonly known as: VITAMIN C  Take 1 tablet by mouth 2 times daily     budesonide-formoterol 160-4.5 MCG/ACT Aero  Commonly known as: SYMBICORT  Inhale 2 puffs into the lungs 2 times daily     dexamethasone 4 MG/ML injection  Commonly known as: DECADRON  Infuse 1.5 mLs intravenously every 24 hours for 5 doses     lidocaine 4 % external patch  Place 1 patch onto the skin daily     pantoprazole 40 MG tablet  Commonly known as: PROTONIX  Take 1 tablet by mouth every morning (before breakfast)     polyethylene glycol 17 g packet  Commonly known as: GLYCOLAX  Take 17 g by mouth daily as needed for Constipation     pyridoxine 50 MG tablet  Commonly known as: B-6  Take 1 tablet by mouth daily  Start taking on: September 14, 2021     vitamin D 50 MCG (2000 UT) Tabs tablet  Commonly known as: CHOLECALCIFEROL  Take 1 tablet by mouth daily  Start taking on: September 14, 2021     zinc sulfate 220 (50 Zn) MG capsule  Commonly known as: ZINCATE  Take 1 capsule by mouth daily  Start taking on: September 14, 2021 CHANGE how you take these medications    carvedilol 3.125 MG tablet  Commonly known as: COREG  Take 1 tablet by mouth 2 times daily (with meals)  What changed:   · medication strength  · how much to take        CONTINUE taking these medications    b complex vitamins capsule     bisacodyl 10 MG suppository  Commonly known as: DULCOLAX     docusate sodium 100 MG capsule  Commonly known as: COLACE     donepezil 10 MG disintegrating tablet  Commonly known as: ARICEPT ODT     Entresto 24-26 MG per tablet  Generic drug: sacubitril-valsartan     ferrous sulfate 325 (65 Fe) MG tablet  Commonly known as: IRON 325     rosuvastatin 10 MG tablet  Commonly known as: CRESTOR     torsemide 10 MG tablet  Commonly known as: DEMADEX     venlafaxine 37.5 MG tablet  Commonly known as: EFFEXOR        STOP taking these medications    aspirin 81 MG EC tablet     cephALEXin 500 MG capsule  Commonly known as: KEFLEX     clopidogrel 75 MG tablet  Commonly known as: PLAVIX     Co Q-10 100 MG Caps     dronabinol 2.5 MG capsule  Commonly known as: MARINOL     Eliquis 2.5 MG Tabs tablet  Generic drug: apixaban     famotidine 20 MG tablet  Commonly known as: PEPCID     magnesium hydroxide 400 MG/5ML suspension  Commonly known as: MILK OF MAGNESIA     sucralfate 1 GM tablet  Commonly known as: CARAFATE     traMADol 50 MG tablet  Commonly known as: ULTRAM           Where to Get Your Medications      Information about where to get these medications is not yet available    Ask your nurse or doctor about these medications  · ascorbic acid 500 MG tablet  · budesonide-formoterol 160-4.5 MCG/ACT Aero  · carvedilol 3.125 MG tablet  · dexamethasone 4 MG/ML injection  · lidocaine 4 % external patch  · pantoprazole 40 MG tablet  · polyethylene glycol 17 g packet  · pyridoxine 50 MG tablet  · vitamin D 50 MCG (2000 UT) Tabs tablet  · zinc sulfate 220 (50 Zn) MG capsule           Note that more than 30 minutes was spent in preparing discharge papers, discussing discharge with patient, medication review, etc.    Signed:  Electronically signed by LAZARO Mayorga CNP on 9/13/2021 at 3:09 PM    NOTE: This report was transcribed using voice recognition software. Every effort was made to ensure accuracy; however, inadvertent computerized transcription errors may be present.

## 2021-09-13 NOTE — CARE COORDINATION
SS Note: Covid positive 9/8/21. Pt had been admitted from [facility] rehab stay and plan was for pt to return there however when pt tested positive for [facility] [place] [facility]. stated pt must be 10 days after positive covid test before pt could be considered again for acceptance. Per 9/10 RELL note daughter aware that [facility] is accepting Covid positive patients. Per [facility] liaison a bed is available and pt is accepted. Per SNF liaison [address] IS WAIVED. RELL spoke to  liaison and she is faxing their PAS/HENS to [facility] liaison. RELL called and left voicemail for pt's daughter Yolie Rayo, [phone], stating pt is accepted to [facility] waiting return call.   Electronically signed by LD Gottlieb on 9/13/2021 at 1:53 PM

## 2021-09-14 LAB
EKG ATRIAL RATE: 78 BPM
EKG P AXIS: 65 DEGREES
EKG P-R INTERVAL: 226 MS
EKG Q-T INTERVAL: 436 MS
EKG QRS DURATION: 162 MS
EKG QTC CALCULATION (BAZETT): 497 MS
EKG R AXIS: -83 DEGREES
EKG T AXIS: 151 DEGREES
EKG VENTRICULAR RATE: 78 BPM

## 2023-09-19 NOTE — Clinical Note
Patient Class: Inpatient [101]   REQUIRED: Diagnosis: MAVIS (acute kidney injury) ([facility] [151138]   Estimated Length of Stay: Estimated stay of more than 2 midnights   Future Attending Provider: Joe Faustin [[id number]]   Admitting Provider: Joe Faustin [[id number]]   Telemetry/Cardiac Monitoring Required?: Yes Cellcept Pregnancy And Lactation Text: This medication is Pregnancy Category D and isn't considered safe during pregnancy. It is unknown if this medication is excreted in breast milk.

## 2024-03-29 ENCOUNTER — HOSPITAL ENCOUNTER (OUTPATIENT)
Age: 89
End: 2024-03-29
Payer: MEDICARE

## 2024-03-29 ENCOUNTER — HOSPITAL ENCOUNTER (OUTPATIENT)
Dept: GENERAL RADIOLOGY | Age: 89
End: 2024-03-29
Payer: MEDICARE

## 2024-03-29 DIAGNOSIS — R91.1 LUNG NODULE: ICD-10-CM

## 2024-03-29 PROCEDURE — 71046 X-RAY EXAM CHEST 2 VIEWS: CPT

## 2024-04-15 ENCOUNTER — APPOINTMENT (OUTPATIENT)
Dept: CT IMAGING | Age: 89
End: 2024-04-15
Payer: MEDICARE

## 2024-04-15 ENCOUNTER — APPOINTMENT (OUTPATIENT)
Dept: GENERAL RADIOLOGY | Age: 89
End: 2024-04-15
Payer: MEDICARE

## 2024-04-15 ENCOUNTER — HOSPITAL ENCOUNTER (EMERGENCY)
Age: 89
Discharge: HOME | End: 2024-04-15
Attending: EMERGENCY MEDICINE
Payer: MEDICARE

## 2024-04-15 VITALS
WEIGHT: 160 LBS | TEMPERATURE: 97.7 F | HEIGHT: 72 IN | HEART RATE: 68 BPM | SYSTOLIC BLOOD PRESSURE: 115 MMHG | DIASTOLIC BLOOD PRESSURE: 51 MMHG | RESPIRATION RATE: 20 BRPM | BODY MASS INDEX: 21.67 KG/M2 | OXYGEN SATURATION: 94 %

## 2024-04-15 DIAGNOSIS — S51.811A SKIN TEAR OF FOREARM WITHOUT COMPLICATION, RIGHT, INITIAL ENCOUNTER: ICD-10-CM

## 2024-04-15 DIAGNOSIS — R40.4 TRANSIENT ALTERATION OF AWARENESS: ICD-10-CM

## 2024-04-15 DIAGNOSIS — R91.1 LUNG NODULE: ICD-10-CM

## 2024-04-15 DIAGNOSIS — M25.511 ACUTE PAIN OF RIGHT SHOULDER: ICD-10-CM
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
## 2024-04-15 DIAGNOSIS — N18.9 CHRONIC RENAL IMPAIRMENT, UNSPECIFIED CKD STAGE: ICD-10-CM

## 2024-04-15 LAB
ALBUMIN SERPL-MCNC: 3.8 G/DL (ref 3.5–5.2)
ALP SERPL-CCNC: 88 U/L (ref 40–129)
ALT SERPL-CCNC: 9 U/L (ref 0–40)
ANION GAP SERPL CALCULATED.3IONS-SCNC: 11 MMOL/L (ref 7–16)
AST SERPL-CCNC: 15 U/L (ref 0–39)
BASOPHILS # BLD: 0.08 K/UL (ref 0–0.2)
BASOPHILS NFR BLD: 1 % (ref 0–2)
BILIRUB SERPL-MCNC: 0.3 MG/DL (ref 0–1.2)
BUN SERPL-MCNC: 34 MG/DL (ref 6–23)
CALCIUM SERPL-MCNC: 9.5 MG/DL (ref 8.6–10.2)
CHLORIDE SERPL-SCNC: 102 MMOL/L (ref 98–107)
CO2 SERPL-SCNC: 25 MMOL/L (ref 22–29)
CREAT SERPL-MCNC: 2.5 MG/DL (ref 0.7–1.2)
EKG ATRIAL RATE: 62 BPM
EKG P-R INTERVAL: 208 MS
EKG Q-T INTERVAL: 480 MS
EKG QRS DURATION: 164 MS
EKG QTC CALCULATION (BAZETT): 487 MS
EKG R AXIS: -60 DEGREES
EKG T AXIS: 104 DEGREES
EKG VENTRICULAR RATE: 62 BPM
EOSINOPHIL # BLD: 0.26 K/UL (ref 0.05–0.5)
EOSINOPHILS RELATIVE PERCENT: 3 % (ref 0–6)
ERYTHROCYTE [DISTWIDTH] IN BLOOD BY AUTOMATED COUNT: 15.3 % (ref 11.5–15)
GFR SERPL CREATININE-BSD FRML MDRD: 24 ML/MIN/1.73M2
GLUCOSE BLD-MCNC: 127 MG/DL (ref 74–99)
GLUCOSE SERPL-MCNC: 127 MG/DL (ref 74–99)
HCT VFR BLD AUTO: 25.9 % (ref 37–54)
HGB BLD-MCNC: 8.5 G/DL (ref 12.5–16.5)
IMM GRANULOCYTES # BLD AUTO: 0.08 K/UL (ref 0–0.58)
IMM GRANULOCYTES NFR BLD: 1 % (ref 0–5)
INFLUENZA A BY PCR: NOT DETECTED
INFLUENZA B BY PCR: NOT DETECTED
LACTATE BLDV-SCNC: 1.7 MMOL/L (ref 0.5–1.9)
LYMPHOCYTES NFR BLD: 1.38 K/UL (ref 1.5–4)
LYMPHOCYTES RELATIVE PERCENT: 14 % (ref 20–42)
MCH RBC QN AUTO: 35.6 PG (ref 26–35)
MCHC RBC AUTO-ENTMCNC: 32.8 G/DL (ref 32–34.5)
MCV RBC AUTO: 108.4 FL (ref 80–99.9)
MONOCYTES NFR BLD: 0.76 K/UL (ref 0.1–0.95)
MONOCYTES NFR BLD: 8 % (ref 2–12)
NEUTROPHILS NFR BLD: 75 % (ref 43–80)
NEUTS SEG NFR BLD: 7.54 K/UL (ref 1.8–7.3)
PLATELET # BLD AUTO: 206 K/UL (ref 130–450)
PMV BLD AUTO: 10.8 FL (ref 7–12)
POTASSIUM SERPL-SCNC: 4.6 MMOL/L (ref 3.5–5)
PROCALCITONIN SERPL-MCNC: 0.11 NG/ML (ref 0–0.08)
PROT SERPL-MCNC: 6.8 G/DL (ref 6.4–8.3)
RBC # BLD AUTO: 2.39 M/UL (ref 3.8–5.8)
SARS-COV-2 RDRP RESP QL NAA+PROBE: NOT DETECTED
SODIUM SERPL-SCNC: 138 MMOL/L (ref 132–146)
SPECIMEN DESCRIPTION: NORMAL
TROPONIN I SERPL HS-MCNC: 49 NG/L (ref 0–11)
WBC OTHER # BLD: 10.1 K/UL (ref 4.5–11.5)

## 2024-04-15 PROCEDURE — 99285 EMERGENCY DEPT VISIT HI MDM: CPT

## 2024-04-15 PROCEDURE — 73110 X-RAY EXAM OF WRIST: CPT

## 2024-04-15 PROCEDURE — 73060 X-RAY EXAM OF HUMERUS: CPT

## 2024-04-15 PROCEDURE — 71250 CT THORAX DX C-: CPT

## 2024-04-15 PROCEDURE — 87635 SARS-COV-2 COVID-19 AMP PRB: CPT

## 2024-04-15 PROCEDURE — 70450 CT HEAD/BRAIN W/O DYE: CPT

## 2024-04-15 PROCEDURE — 85025 COMPLETE CBC W/AUTO DIFF WBC: CPT

## 2024-04-15 PROCEDURE — 93010 ELECTROCARDIOGRAM REPORT: CPT | Performed by: INTERNAL MEDICINE

## 2024-04-15 PROCEDURE — 71045 X-RAY EXAM CHEST 1 VIEW: CPT

## 2024-04-15 PROCEDURE — 82962 GLUCOSE BLOOD TEST: CPT

## 2024-04-15 PROCEDURE — 6370000000 HC RX 637 (ALT 250 FOR IP): Performed by: EMERGENCY MEDICINE

## 2024-04-15 PROCEDURE — 83605 ASSAY OF LACTIC ACID: CPT

## 2024-04-15 PROCEDURE — 84145 PROCALCITONIN (PCT): CPT

## 2024-04-15 PROCEDURE — 72125 CT NECK SPINE W/O DYE: CPT

## 2024-04-15 PROCEDURE — 87502 INFLUENZA DNA AMP PROBE: CPT

## 2024-04-15 PROCEDURE — 2580000003 HC RX 258: Performed by: EMERGENCY MEDICINE

## 2024-04-15 PROCEDURE — 93005 ELECTROCARDIOGRAM TRACING: CPT | Performed by: EMERGENCY MEDICINE

## 2024-04-15 PROCEDURE — 80053 COMPREHEN METABOLIC PANEL: CPT

## 2024-04-15 PROCEDURE — 84484 ASSAY OF TROPONIN QUANT: CPT

## 2024-04-15 PROCEDURE — 87040 BLOOD CULTURE FOR BACTERIA: CPT

## 2024-04-15 RX ORDER — TRAMADOL HYDROCHLORIDE 50 MG/1
50 TABLET ORAL ONCE
Status: COMPLETED | OUTPATIENT
Start: 2024-04-15 | End: 2024-04-15

## 2024-04-15 RX ORDER — TRAMADOL HYDROCHLORIDE 50 MG/1
50 TABLET ORAL EVERY 6 HOURS PRN
COMMUNITY
End: 2024-04-15 | Stop reason: ALTCHOICE

## 2024-04-15 RX ORDER — MEMANTINE HYDROCHLORIDE 10 MG/1
10 TABLET ORAL DAILY
COMMUNITY

## 2024-04-15 RX ORDER — M-VIT,TX,IRON,MINS/CALC/FOLIC 27MG-0.4MG
1 TABLET ORAL DAILY
COMMUNITY

## 2024-04-15 RX ORDER — ATORVASTATIN CALCIUM 20 MG/1
20 TABLET, FILM COATED ORAL DAILY
COMMUNITY

## 2024-04-15 RX ORDER — 0.9 % SODIUM CHLORIDE 0.9 %
1000 INTRAVENOUS SOLUTION INTRAVENOUS ONCE
Status: COMPLETED | OUTPATIENT
Start: 2024-04-15 | End: 2024-04-15

## 2024-04-15 RX ORDER — ACETAMINOPHEN 325 MG/1
650 TABLET ORAL EVERY 6 HOURS PRN
COMMUNITY

## 2024-04-15 RX ORDER — TRAMADOL HYDROCHLORIDE 50 MG/1
50 TABLET ORAL EVERY 6 HOURS PRN
Qty: 12 TABLET | Refills: 0 | Status: SHIPPED | OUTPATIENT
Start: 2024-04-15 | End: 2024-04-18

## 2024-04-15 RX ORDER — DONEPEZIL HYDROCHLORIDE 10 MG/1
10 TABLET, FILM COATED ORAL NIGHTLY
COMMUNITY

## 2024-04-15 RX ORDER — ACETAMINOPHEN 325 MG/1
650 TABLET ORAL ONCE
Status: COMPLETED | OUTPATIENT
Start: 2024-04-15 | End: 2024-04-15

## 2024-04-15 RX ADMIN — TRAMADOL HYDROCHLORIDE 50 MG: 50 TABLET ORAL at 13:32

## 2024-04-15 RX ADMIN — SODIUM CHLORIDE 1000 ML: 9 INJECTION, SOLUTION INTRAVENOUS at 13:27

## 2024-04-15 RX ADMIN — ACETAMINOPHEN 650 MG: 325 TABLET ORAL at 10:52

## 2024-04-15 ASSESSMENT — PAIN SCALES - GENERAL
PAINLEVEL_OUTOF10: 8
PAINLEVEL_OUTOF10: 8
PAINLEVEL_OUTOF10: 7

## 2024-04-15 ASSESSMENT — PAIN DESCRIPTION - LOCATION: LOCATION: WRIST

## 2024-04-15 ASSESSMENT — PAIN - FUNCTIONAL ASSESSMENT: PAIN_FUNCTIONAL_ASSESSMENT: 0-10

## 2024-04-15 ASSESSMENT — PAIN DESCRIPTION - DESCRIPTORS: DESCRIPTORS: PATIENT UNABLE TO DESCRIBE

## 2024-04-15 ASSESSMENT — PAIN DESCRIPTION - PAIN TYPE: TYPE: ACUTE PAIN

## 2024-04-15 ASSESSMENT — PAIN DESCRIPTION - FREQUENCY: FREQUENCY: CONTINUOUS

## 2024-04-15 ASSESSMENT — PAIN DESCRIPTION - ORIENTATION: ORIENTATION: RIGHT

## 2024-04-15 NOTE — ED PROVIDER NOTES
Basophils Absolute 0.08 0.00 - 0.20 k/uL    Immature Granulocytes Absolute 0.08 0.00 - 0.58 k/uL   CMP w/ Reflex to MG   Result Value Ref Range    Sodium 138 132 - 146 mmol/L    Potassium 4.6 3.5 - 5.0 mmol/L    Chloride 102 98 - 107 mmol/L    CO2 25 22 - 29 mmol/L    Anion Gap 11 7 - 16 mmol/L    Glucose 127 (H) 74 - 99 mg/dL    BUN 34 (H) 6 - 23 mg/dL    Creatinine 2.5 (H) 0.70 - 1.20 mg/dL    Est, Glom Filt Rate 24 (L) >60 mL/min/1.73m2    Calcium 9.5 8.6 - 10.2 mg/dL    Total Protein 6.8 6.4 - 8.3 g/dL    Albumin 3.8 3.5 - 5.2 g/dL    Total Bilirubin 0.3 0.0 - 1.2 mg/dL    Alkaline Phosphatase 88 40 - 129 U/L    ALT 9 0 - 40 U/L    AST 15 0 - 39 U/L   Lactate, Sepsis   Result Value Ref Range    Lactic Acid, Sepsis 1.7 0.5 - 1.9 mmol/L   Procalcitonin   Result Value Ref Range    Procalcitonin 0.11 (H) 0.00 - 0.08 ng/mL   Troponin   Result Value Ref Range    Troponin, High Sensitivity 49 (H) 0 - 11 ng/L   POCT Glucose   Result Value Ref Range    POC Glucose 127 (H) 74 - 99 mg/dL   EKG 12 Lead   Result Value Ref Range    Ventricular Rate 62 BPM    Atrial Rate 62 BPM    P-R Interval 208 ms    QRS Duration 164 ms    Q-T Interval 480 ms    QTc Calculation (Bazett) 487 ms    R Axis -60 degrees    T Axis 104 degrees       Radiology:  CT HEAD WO CONTRAST   Final Result   1. There is no acute intracranial abnormality.  Specifically, there is no   intracranial hemorrhage.   2. Atrophy and periventricular leukomalacia,         CT CERVICAL SPINE WO CONTRAST   Final Result   1. There is no acute compression fracture or subluxation of the cervical   spine.   2. Advanced multilevel degenerative disc and degenerative joint disease.   .         CT CHEST WO CONTRAST   Final Result   Please see combined CT chest abdomen and pelvis report within the abdominal   folder.  Additionally noted 6 mm noncalcified nodule right mid lung   incidentally noted if high risk CT chest may be considered for follow-up at   6-12 months utilizing

## 2024-04-20 LAB
MICROORGANISM SPEC CULT: NORMAL
MICROORGANISM SPEC CULT: NORMAL
SERVICE CMNT-IMP: NORMAL
SERVICE CMNT-IMP: NORMAL
SPECIMEN DESCRIPTION: NORMAL
SPECIMEN DESCRIPTION: NORMAL

## 2024-07-25 ENCOUNTER — OUTSIDE SERVICES (OUTPATIENT)
Dept: PRIMARY CARE CLINIC | Age: 89
End: 2024-07-25

## 2024-07-25 DIAGNOSIS — I25.810 CORONARY ARTERY DISEASE INVOLVING AUTOLOGOUS VEIN CORONARY BYPASS GRAFT WITHOUT ANGINA PECTORIS: ICD-10-CM

## 2024-07-25 DIAGNOSIS — Z51.5 HOSPICE CARE PATIENT: ICD-10-CM

## 2024-07-25 DIAGNOSIS — I50.32 CHRONIC DIASTOLIC CONGESTIVE HEART FAILURE (HCC): Primary | ICD-10-CM

## 2024-07-25 DIAGNOSIS — I10 ESSENTIAL HYPERTENSION: ICD-10-CM

## 2024-07-25 DIAGNOSIS — F33.42 RECURRENT MAJOR DEPRESSIVE DISORDER, IN FULL REMISSION (HCC): ICD-10-CM

## 2024-07-25 DIAGNOSIS — J44.9 CHRONIC OBSTRUCTIVE PULMONARY DISEASE, UNSPECIFIED COPD TYPE (HCC): ICD-10-CM

## 2024-07-30 ASSESSMENT — ENCOUNTER SYMPTOMS
WHEEZING: 0
SINUS PAIN: 0
GASTROINTESTINAL NEGATIVE: 1
RHINORRHEA: 0
EYE ITCHING: 0
SORE THROAT: 0
COUGH: 0
TROUBLE SWALLOWING: 0
EYE REDNESS: 0
SHORTNESS OF BREATH: 0
EYE DISCHARGE: 0
SINUS PRESSURE: 0
VOICE CHANGE: 0

## 2024-07-30 ASSESSMENT — COPD QUESTIONNAIRES: COPD: 1

## 2024-07-30 ASSESSMENT — VISUAL ACUITY: OU: 1

## 2024-07-30 NOTE — PROGRESS NOTES
Follow-up with your pediatrician within 24-48 hours after discharge. updated    reports that he quit smoking about 40 years ago. He started smoking about 60 years ago. He does not have any smokeless tobacco history on file. He reports that he does not drink alcohol and does not use drugs.     Objective:  Vital signs for Petey was reviewed in the nursing home record.     Physical Exam:  Physical Exam  Vitals reviewed.   Constitutional:       General: He is not in acute distress.     Appearance: He is normal weight. He is not ill-appearing or toxic-appearing.      Comments: Appears appropriate for age   HENT:      Head: Normocephalic and atraumatic.      Right Ear: Tympanic membrane and external ear normal.      Left Ear: Tympanic membrane and external ear normal.      Ears:      Comments: Middle ear well aerated.     Nose: Nose normal.      Mouth/Throat:      Mouth: Mucous membranes are moist.      Dentition: Normal dentition. No gingival swelling or dental caries.      Pharynx: Oropharynx is clear. Uvula midline.      Comments: Gums appear healthy.   No thrush no mucositis  Eyes:      General: Vision grossly intact.      Extraocular Movements: Extraocular movements intact.      Conjunctiva/sclera: Conjunctivae normal.      Pupils: Pupils are equal, round, and reactive to light.      Comments: Fundoscopic exam is benign  Retinal vessels: Normal   Neck:      Vascular: No carotid bruit.      Comments: Thyroid is normal in size.  Carotids 2+ and equal bilaterally  Cardiovascular:      Rate and Rhythm: Normal rate and regular rhythm.      Pulses: Normal pulses.      Heart sounds: Normal heart sounds, S1 normal and S2 normal. No murmur heard.     No friction rub. No gallop.      Comments: Pedal pulses 2+ and equal bilaterally  Pulmonary:      Effort: Pulmonary effort is normal.      Breath sounds: Normal breath sounds.   Abdominal:      General: Bowel sounds are normal. There is no distension.      Palpations: Abdomen is soft. There is no mass.      Tenderness: There is no abdominal

## (undated) DEVICE — SPONGE GZ 4IN 4IN 4 PLY N WVN AVANT

## (undated) DEVICE — CONTAINER SPEC COLL 960ML POLYPR TRIANG GRAD INTAKE/OUTPUT

## (undated) DEVICE — BLOCK BITE 60FR CAREGUARD

## (undated) DEVICE — MASK,FACE,MAXFLUIDPROTECT,SHIELD/ERLPS: Brand: MEDLINE

## (undated) DEVICE — GOWN ISOLATN REG YEL M WT MULTIPLY SIDETIE LEV 2

## (undated) DEVICE — 6 X 9  1.75MIL 4-WALL LABGUARD: Brand: MINIGRIP COMMERCIAL LLC

## (undated) DEVICE — YANKAUER,BULB TIP,W/O VENT,RIGID,STERILE: Brand: MEDLINE

## (undated) DEVICE — KENDALL 450 SERIES MONITORING FOAM ELECTRODE - RECTANGULAR SHAPE ( 3/PK): Brand: KENDALL

## (undated) DEVICE — FORCEPS BX L240CM JAW DIA2.8MM L CAP W/ NDL MIC MESH TOOTH

## (undated) DEVICE — TUBING, SUCTION, 1/4" X 10', STRAIGHT: Brand: MEDLINE

## (undated) DEVICE — Device: Brand: DEFENDO VALVE AND CONNECTOR KIT

## (undated) DEVICE — LUBRICANT SURG JELLY ST BACTER TUBE 4.25OZ

## (undated) DEVICE — KIT BEDSIDE REVITAL OX 500ML